# Patient Record
Sex: MALE | Race: WHITE | Employment: OTHER | URBAN - METROPOLITAN AREA
[De-identification: names, ages, dates, MRNs, and addresses within clinical notes are randomized per-mention and may not be internally consistent; named-entity substitution may affect disease eponyms.]

---

## 2018-07-18 ENCOUNTER — LAB ENCOUNTER (OUTPATIENT)
Dept: LAB | Facility: HOSPITAL | Age: 73
End: 2018-07-18
Attending: SPECIALIST
Payer: MEDICARE

## 2018-07-18 DIAGNOSIS — K51.90 ULCERATIVE COLITIS (HCC): Primary | ICD-10-CM

## 2018-07-18 LAB
ALBUMIN SERPL BCP-MCNC: 4 G/DL (ref 3.5–4.8)
ALBUMIN/GLOB SERPL: 1.6 {RATIO} (ref 1–2)
ALP SERPL-CCNC: 56 U/L (ref 32–100)
ALT SERPL-CCNC: 20 U/L (ref 17–63)
ANION GAP SERPL CALC-SCNC: 4 MMOL/L (ref 0–18)
AST SERPL-CCNC: 21 U/L (ref 15–41)
BASOPHILS # BLD: 0.1 K/UL (ref 0–0.2)
BASOPHILS NFR BLD: 1 %
BILIRUB SERPL-MCNC: 0.6 MG/DL (ref 0.3–1.2)
BUN SERPL-MCNC: 15 MG/DL (ref 8–20)
BUN/CREAT SERPL: 15.2 (ref 10–20)
CALCIUM SERPL-MCNC: 9 MG/DL (ref 8.5–10.5)
CHLORIDE SERPL-SCNC: 107 MMOL/L (ref 95–110)
CO2 SERPL-SCNC: 29 MMOL/L (ref 22–32)
CREAT SERPL-MCNC: 0.99 MG/DL (ref 0.5–1.5)
EOSINOPHIL # BLD: 0.4 K/UL (ref 0–0.7)
EOSINOPHIL NFR BLD: 6 %
ERYTHROCYTE [DISTWIDTH] IN BLOOD BY AUTOMATED COUNT: 14 % (ref 11–15)
GLOBULIN PLAS-MCNC: 2.5 G/DL (ref 2.5–3.7)
GLUCOSE SERPL-MCNC: 81 MG/DL (ref 70–99)
HCT VFR BLD AUTO: 44.5 % (ref 41–52)
HGB BLD-MCNC: 14.5 G/DL (ref 13.5–17.5)
LYMPHOCYTES # BLD: 2.7 K/UL (ref 1–4)
LYMPHOCYTES NFR BLD: 39 %
MCH RBC QN AUTO: 30.6 PG (ref 27–32)
MCHC RBC AUTO-ENTMCNC: 32.6 G/DL (ref 32–37)
MCV RBC AUTO: 93.9 FL (ref 80–100)
MONOCYTES # BLD: 0.6 K/UL (ref 0–1)
MONOCYTES NFR BLD: 9 %
NEUTROPHILS # BLD AUTO: 3.1 K/UL (ref 1.8–7.7)
NEUTROPHILS NFR BLD: 45 %
OSMOLALITY UR CALC.SUM OF ELEC: 290 MOSM/KG (ref 275–295)
PATIENT FASTING: NO
PLATELET # BLD AUTO: 201 K/UL (ref 140–400)
PMV BLD AUTO: 8.5 FL (ref 7.4–10.3)
POTASSIUM SERPL-SCNC: 4.1 MMOL/L (ref 3.3–5.1)
PROT SERPL-MCNC: 6.5 G/DL (ref 5.9–8.4)
RBC # BLD AUTO: 4.74 M/UL (ref 4.5–5.9)
SODIUM SERPL-SCNC: 140 MMOL/L (ref 136–144)
T4 SERPL-MCNC: 8.7 MCG/DL (ref 6.1–12.2)
WBC # BLD AUTO: 7 K/UL (ref 4–11)

## 2018-07-18 PROCEDURE — 36415 COLL VENOUS BLD VENIPUNCTURE: CPT

## 2018-07-18 PROCEDURE — 84436 ASSAY OF TOTAL THYROXINE: CPT

## 2018-07-18 PROCEDURE — 80053 COMPREHEN METABOLIC PANEL: CPT

## 2018-07-18 PROCEDURE — 85025 COMPLETE CBC W/AUTO DIFF WBC: CPT

## 2019-07-15 ENCOUNTER — LAB ENCOUNTER (OUTPATIENT)
Dept: LAB | Facility: HOSPITAL | Age: 74
End: 2019-07-15
Attending: SPECIALIST
Payer: MEDICARE

## 2019-07-15 DIAGNOSIS — K51.90 ULCERATIVE COLITIS (HCC): Primary | ICD-10-CM

## 2019-07-15 LAB
ALBUMIN SERPL-MCNC: 3.7 G/DL (ref 3.4–5)
ALBUMIN/GLOB SERPL: 1.1 {RATIO} (ref 1–2)
ALP LIVER SERPL-CCNC: 80 U/L (ref 45–117)
ALT SERPL-CCNC: 23 U/L (ref 16–61)
ANION GAP SERPL CALC-SCNC: 6 MMOL/L (ref 0–18)
AST SERPL-CCNC: 18 U/L (ref 15–37)
BASOPHILS # BLD AUTO: 0.1 X10(3) UL (ref 0–0.2)
BASOPHILS NFR BLD AUTO: 1.2 %
BILIRUB SERPL-MCNC: 0.3 MG/DL (ref 0.1–2)
BUN BLD-MCNC: 18 MG/DL (ref 7–18)
BUN/CREAT SERPL: 17.6 (ref 10–20)
CALCIUM BLD-MCNC: 8.8 MG/DL (ref 8.5–10.1)
CHLORIDE SERPL-SCNC: 107 MMOL/L (ref 98–112)
CO2 SERPL-SCNC: 29 MMOL/L (ref 21–32)
CREAT BLD-MCNC: 1.02 MG/DL (ref 0.7–1.3)
DEPRECATED RDW RBC AUTO: 45.1 FL (ref 35.1–46.3)
EOSINOPHIL # BLD AUTO: 0.59 X10(3) UL (ref 0–0.7)
EOSINOPHIL NFR BLD AUTO: 7.3 %
ERYTHROCYTE [DISTWIDTH] IN BLOOD BY AUTOMATED COUNT: 13.2 % (ref 11–15)
GLOBULIN PLAS-MCNC: 3.3 G/DL (ref 2.8–4.4)
GLUCOSE BLD-MCNC: 94 MG/DL (ref 70–99)
HCT VFR BLD AUTO: 44.9 % (ref 39–53)
HGB BLD-MCNC: 14.3 G/DL (ref 13–17.5)
IMM GRANULOCYTES # BLD AUTO: 0.03 X10(3) UL (ref 0–1)
IMM GRANULOCYTES NFR BLD: 0.4 %
LYMPHOCYTES # BLD AUTO: 2.91 X10(3) UL (ref 1–4)
LYMPHOCYTES NFR BLD AUTO: 36 %
M PROTEIN MFR SERPL ELPH: 7 G/DL (ref 6.4–8.2)
MCH RBC QN AUTO: 29.9 PG (ref 26–34)
MCHC RBC AUTO-ENTMCNC: 31.8 G/DL (ref 31–37)
MCV RBC AUTO: 93.9 FL (ref 80–100)
MONOCYTES # BLD AUTO: 0.61 X10(3) UL (ref 0.1–1)
MONOCYTES NFR BLD AUTO: 7.5 %
NEUTROPHILS # BLD AUTO: 3.84 X10 (3) UL (ref 1.5–7.7)
NEUTROPHILS # BLD AUTO: 3.84 X10(3) UL (ref 1.5–7.7)
NEUTROPHILS NFR BLD AUTO: 47.6 %
OSMOLALITY SERPL CALC.SUM OF ELEC: 296 MOSM/KG (ref 275–295)
PATIENT FASTING: NO
PLATELET # BLD AUTO: 216 10(3)UL (ref 150–450)
POTASSIUM SERPL-SCNC: 4.2 MMOL/L (ref 3.5–5.1)
RBC # BLD AUTO: 4.78 X10(6)UL (ref 3.8–5.8)
SODIUM SERPL-SCNC: 142 MMOL/L (ref 136–145)
WBC # BLD AUTO: 8.1 X10(3) UL (ref 4–11)

## 2019-07-15 PROCEDURE — 85025 COMPLETE CBC W/AUTO DIFF WBC: CPT

## 2019-07-15 PROCEDURE — 80053 COMPREHEN METABOLIC PANEL: CPT

## 2019-07-15 PROCEDURE — 36415 COLL VENOUS BLD VENIPUNCTURE: CPT

## 2019-08-01 ENCOUNTER — ANESTHESIA EVENT (OUTPATIENT)
Dept: ENDOSCOPY | Facility: HOSPITAL | Age: 74
End: 2019-08-01
Payer: MEDICARE

## 2019-08-01 ENCOUNTER — HOSPITAL ENCOUNTER (OUTPATIENT)
Facility: HOSPITAL | Age: 74
Setting detail: HOSPITAL OUTPATIENT SURGERY
Discharge: HOME | End: 2019-08-01
Attending: SPECIALIST | Admitting: SPECIALIST
Payer: MEDICARE

## 2019-08-01 ENCOUNTER — ANESTHESIA (OUTPATIENT)
Dept: ENDOSCOPY | Facility: HOSPITAL | Age: 74
End: 2019-08-01
Payer: MEDICARE

## 2019-08-01 VITALS
SYSTOLIC BLOOD PRESSURE: 105 MMHG | RESPIRATION RATE: 18 BRPM | DIASTOLIC BLOOD PRESSURE: 66 MMHG | HEART RATE: 65 BPM | HEIGHT: 70 IN | BODY MASS INDEX: 21.47 KG/M2 | OXYGEN SATURATION: 97 % | WEIGHT: 150 LBS

## 2019-08-01 DIAGNOSIS — Z87.19 HX OF ULCERATIVE COLITIS: ICD-10-CM

## 2019-08-01 PROCEDURE — 36415 COLL VENOUS BLD VENIPUNCTURE: CPT | Performed by: SPECIALIST

## 2019-08-01 PROCEDURE — 0DBF8ZX EXCISION OF RIGHT LARGE INTESTINE, VIA NATURAL OR ARTIFICIAL OPENING ENDOSCOPIC, DIAGNOSTIC: ICD-10-PCS | Performed by: SPECIALIST

## 2019-08-01 PROCEDURE — 0DBG8ZX EXCISION OF LEFT LARGE INTESTINE, VIA NATURAL OR ARTIFICIAL OPENING ENDOSCOPIC, DIAGNOSTIC: ICD-10-PCS | Performed by: SPECIALIST

## 2019-08-01 PROCEDURE — 88305 TISSUE EXAM BY PATHOLOGIST: CPT | Performed by: SPECIALIST

## 2019-08-01 RX ORDER — LIDOCAINE HYDROCHLORIDE 10 MG/ML
INJECTION, SOLUTION EPIDURAL; INFILTRATION; INTRACAUDAL; PERINEURAL AS NEEDED
Status: DISCONTINUED | OUTPATIENT
Start: 2019-08-01 | End: 2019-08-01 | Stop reason: SURG

## 2019-08-01 RX ORDER — SODIUM CHLORIDE, SODIUM LACTATE, POTASSIUM CHLORIDE, CALCIUM CHLORIDE 600; 310; 30; 20 MG/100ML; MG/100ML; MG/100ML; MG/100ML
INJECTION, SOLUTION INTRAVENOUS CONTINUOUS
Status: DISCONTINUED | OUTPATIENT
Start: 2019-08-01 | End: 2019-08-01

## 2019-08-01 RX ORDER — NALOXONE HYDROCHLORIDE 0.4 MG/ML
80 INJECTION, SOLUTION INTRAMUSCULAR; INTRAVENOUS; SUBCUTANEOUS AS NEEDED
Status: DISCONTINUED | OUTPATIENT
Start: 2019-08-01 | End: 2019-08-01

## 2019-08-01 RX ADMIN — SODIUM CHLORIDE, SODIUM LACTATE, POTASSIUM CHLORIDE, CALCIUM CHLORIDE: 600; 310; 30; 20 INJECTION, SOLUTION INTRAVENOUS at 09:11:00

## 2019-08-01 RX ADMIN — LIDOCAINE HYDROCHLORIDE 50 MG: 10 INJECTION, SOLUTION EPIDURAL; INFILTRATION; INTRACAUDAL; PERINEURAL at 08:40:00

## 2019-08-01 RX ADMIN — SODIUM CHLORIDE, SODIUM LACTATE, POTASSIUM CHLORIDE, CALCIUM CHLORIDE: 600; 310; 30; 20 INJECTION, SOLUTION INTRAVENOUS at 08:39:00

## 2019-08-01 NOTE — ANESTHESIA POSTPROCEDURE EVALUATION
Patient: Yimi Hdez.     Procedure Summary     Date:  08/01/19 Room / Location:  [facility] ENDOSCOPY 01 / [facility] ENDOSCOPY    Anesthesia Start:  5414 Anesthesia Stop:  0055    Procedure:  COLONOSCOPY (N/A ) Diagnosis:       Hx of ulcerative colitis      Marta Nissen

## 2019-08-01 NOTE — ANESTHESIA PREPROCEDURE EVALUATION
Anesthesia PreOp Note    HPI:     Gaetano Downeyr. is a 69year old male who presents for preoperative consultation requested by: Margaret Cherry MD    Date of Surgery: 8/1/2019    Procedure(s):  COLONOSCOPY  Indication: Hx of ulcerative colitis Non-medical: Not on file    Tobacco Use      Smoking status: Never Smoker      Smokeless tobacco: Never Used    Substance and Sexual Activity      Alcohol use: Yes        Frequency: Monthly or less        Comment: occationally 1/2 glasses of wine month history of anesthetic complications   Airway   Mallampati: I  TM distance: >3 FB  Neck ROM: full  Dental - normal exam     Pulmonary - negative ROS and normal exam   Cardiovascular - negative ROS and normal exam    Neuro/Psych - negative ROS     GI/Hepatic

## 2019-08-01 NOTE — OPERATIVE REPORT
[facility]    PATIENT'S NAME: Derek Biggs   ATTENDING PHYSICIAN: Loan Delgado. Beryl Schaeffer MD   OPERATING PHYSICIAN: Loan Delgado.  Beryl Schaeffer MD   PATIENT ACCOUNT#:   [id number]    LOCATION:  [address] ROOM 5 [facility] 10  MEDICAL RECORD #:   [id number]

## 2019-08-14 ENCOUNTER — OFFICE VISIT (OUTPATIENT)
Dept: INTERNAL MEDICINE CLINIC | Facility: CLINIC | Age: 74
End: 2019-08-14
Payer: MEDICARE

## 2019-08-14 VITALS
BODY MASS INDEX: 21 KG/M2 | WEIGHT: 145 LBS | DIASTOLIC BLOOD PRESSURE: 73 MMHG | RESPIRATION RATE: 17 BRPM | HEART RATE: 72 BPM | TEMPERATURE: 98 F | SYSTOLIC BLOOD PRESSURE: 131 MMHG

## 2019-08-14 DIAGNOSIS — K51.80 OTHER ULCERATIVE COLITIS WITHOUT COMPLICATION (HCC): ICD-10-CM

## 2019-08-14 DIAGNOSIS — Z00.00 MEDICARE ANNUAL WELLNESS VISIT, SUBSEQUENT: ICD-10-CM

## 2019-08-14 DIAGNOSIS — I83.92 ASYMPTOMATIC VARICOSE VEINS OF LEFT LOWER EXTREMITY: Primary | ICD-10-CM

## 2019-08-14 DIAGNOSIS — M89.9 BONE DISORDER: ICD-10-CM

## 2019-08-14 DIAGNOSIS — Z12.5 PROSTATE CANCER SCREENING: ICD-10-CM

## 2019-08-14 DIAGNOSIS — Z00.00 ENCOUNTER FOR ANNUAL HEALTH EXAMINATION: ICD-10-CM

## 2019-08-14 DIAGNOSIS — H40.10X1 OPEN-ANGLE GLAUCOMA OF BOTH EYES, MILD STAGE, UNSPECIFIED OPEN-ANGLE GLAUCOMA TYPE: ICD-10-CM

## 2019-08-14 DIAGNOSIS — E78.5 HYPERLIPIDEMIA, UNSPECIFIED HYPERLIPIDEMIA TYPE: ICD-10-CM

## 2019-08-14 PROCEDURE — G0439 PPPS, SUBSEQ VISIT: HCPCS | Performed by: INTERNAL MEDICINE

## 2019-08-15 NOTE — PROGRESS NOTES
HPI:    Patient ID: Lois Haines. is a 69year old male. HPI    Review of Systems         Current Outpatient Medications:  Mesalamine (ASACOL HD) 800 MG Oral Tab EC Take 800 mg by mouth 3 (three) times daily.  Disp:  Rfl:    latanoprost 0.005 % Op

## 2019-08-19 ENCOUNTER — MED REC SCAN ONLY (OUTPATIENT)
Dept: INTERNAL MEDICINE CLINIC | Facility: CLINIC | Age: 74
End: 2019-08-19

## 2019-08-19 NOTE — PATIENT INSTRUCTIONS
Juan Luis Cagle Jr.'s SCREENING SCHEDULE   Tests on this list are recommended by your physician but may not be covered, or covered at this frequency, by your insurer. Please check with your insurance carrier before scheduling to verify coverage.     PREV results found for this or any previous visit. No flowsheet data found. Fecal Occult Blood   Covered Annually No results found for: FOB, OCCULTSTOOL No flowsheet data found.      Barium Enema-   uncomfortable but covered  Covered but uncomfortable   Glau prescription benefits     Recommended Websites for Advanced Directives    [email or web address] org/publications/[email or web address]/[email or web address]. pdf  An information packet, including necessary form from the [facility] website. http://www. idph.stat Welcome to Medicare, and non-screening if indicated for medical reasons    Electrocardiogram date Routine EKG is not a screening covered service except at the Welcome to Medicare Visit    Abdominal aortic aneurysm screening (once between ages 73-68)  No re for Moderate/High Risk No orders found for this or any previous visit.  Medium/high risk factors:   End-stage renal disease   Hemophiliacs who received Factor VIII or IX concentrates   Clients of institutions for the mentally retarded   Persons who live in

## 2019-08-19 NOTE — PROGRESS NOTES
HPI:   Judy Scherer. is a 69year old male who presents for a MA (Medicare Advantage) Supervisit (Once per calendar year).      Pt here for first time to establish care and for annual exam   Provided his medical history in detail, has copies of his Last 3 Encounters:  08/14/19 : 145 lb (65.8 kg)  07/29/19 : 150 lb (68 kg)  12/05/16 : 142 lb (64.4 kg)     Last Cholesterol Labs:   No results found for: CHOLEST, HDL, LDL, TRIG       Last Chemistry Labs:   Lab Results   Component Value Date    AST 18 07/ Vaccination History     Immunization History   Administered Date(s) Administered   • FLU VACC High Dose 65 YRS & Older PRSV Free (50462) 10/15/2018   • Fluzone Vaccine Medicare () 10/06/2015, 12/10/2016, 12/07/2017        ASSESSMENT AND OTH of chart, separate sheet to patient  [address] Internal Lab or Procedure External Lab or Procedure   Diabetes Screening      HbgA1C   Annually No results found for: A1C    No flowsheet data found.     Fasting Blood Londell Amen prescription benefits, but Medicare does not cover unless Medically needed    Zoster   Not covered by Medicare Part B No vaccine history found This may be covered with your pharmacy  prescription benefits                    HPI:    Patient ID: Deedee Hansen friction rub. No murmur heard. Pulmonary/Chest: Effort normal and breath sounds normal. No respiratory distress. He has no wheezes. He has no rales. Abdominal: Soft. Bowel sounds are normal. He exhibits no distension. There is no tenderness.  There is

## 2019-08-20 ENCOUNTER — HOSPITAL ENCOUNTER (OUTPATIENT)
Dept: BONE DENSITY | Age: 74
Discharge: HOME | End: 2019-08-20
Attending: INTERNAL MEDICINE
Payer: MEDICARE

## 2019-08-20 ENCOUNTER — APPOINTMENT (OUTPATIENT)
Dept: LAB | Age: 74
End: 2019-08-20
Attending: INTERNAL MEDICINE
Payer: MEDICARE

## 2019-08-20 DIAGNOSIS — H40.10X1 OPEN-ANGLE GLAUCOMA OF BOTH EYES, MILD STAGE, UNSPECIFIED OPEN-ANGLE GLAUCOMA TYPE: ICD-10-CM

## 2019-08-20 DIAGNOSIS — M89.9 BONE DISORDER: ICD-10-CM

## 2019-08-20 DIAGNOSIS — E78.5 HYPERLIPIDEMIA, UNSPECIFIED HYPERLIPIDEMIA TYPE: ICD-10-CM

## 2019-08-20 DIAGNOSIS — K51.80 OTHER ULCERATIVE COLITIS WITHOUT COMPLICATION (HCC): ICD-10-CM

## 2019-08-20 DIAGNOSIS — I83.92 ASYMPTOMATIC VARICOSE VEINS OF LEFT LOWER EXTREMITY: ICD-10-CM

## 2019-08-20 DIAGNOSIS — Z12.5 PROSTATE CANCER SCREENING: ICD-10-CM

## 2019-08-20 LAB
BACTERIA UR QL AUTO: NEGATIVE /HPF
BILIRUB UR QL: NEGATIVE
CHOLEST SMN-MCNC: 166 MG/DL (ref ?–200)
CLARITY UR: CLEAR
COLOR UR: YELLOW
COMPLEXED PSA SERPL-MCNC: 2.02 NG/ML (ref ?–4)
GLUCOSE UR-MCNC: NEGATIVE MG/DL
HDLC SERPL-MCNC: 50 MG/DL (ref 40–59)
KETONES UR-MCNC: NEGATIVE MG/DL
LDLC SERPL CALC-MCNC: 101 MG/DL (ref ?–100)
NITRITE UR QL STRIP.AUTO: NEGATIVE
NONHDLC SERPL-MCNC: 116 MG/DL (ref ?–130)
PATIENT FASTING: YES
PH UR: 5 [PH] (ref 5–8)
PROT UR-MCNC: NEGATIVE MG/DL
RBC #/AREA URNS AUTO: 1 /HPF
SP GR UR STRIP: 1.02 (ref 1–1.03)
TRIGL SERPL-MCNC: 75 MG/DL (ref 30–149)
UROBILINOGEN UR STRIP-ACNC: <2
VIT C UR-MCNC: NEGATIVE MG/DL
VLDLC SERPL CALC-MCNC: 15 MG/DL (ref 0–30)
WBC #/AREA URNS AUTO: 3 /HPF

## 2019-08-20 PROCEDURE — 80061 LIPID PANEL: CPT

## 2019-08-20 PROCEDURE — 36415 COLL VENOUS BLD VENIPUNCTURE: CPT

## 2019-08-20 PROCEDURE — 81001 URINALYSIS AUTO W/SCOPE: CPT

## 2019-08-20 PROCEDURE — 77080 DXA BONE DENSITY AXIAL: CPT | Performed by: INTERNAL MEDICINE

## 2019-08-22 RX ORDER — ALENDRONATE SODIUM 70 MG/1
70 TABLET ORAL WEEKLY
Qty: 4 TABLET | Refills: 11 | Status: SHIPPED | OUTPATIENT
Start: 2019-08-22 | End: 2020-01-21

## 2019-08-28 NOTE — PROGRESS NOTES
Patient came in stating that some information on his annual physical was entered incorrectly. For the [address], portion that says \"Do you need help with. ..\", that has been corrected, along with the \"Functional Status\".   Only

## 2019-08-30 ENCOUNTER — APPOINTMENT (OUTPATIENT)
Dept: LAB | Facility: HOSPITAL | Age: 74
End: 2019-08-30
Attending: INTERNAL MEDICINE
Payer: MEDICARE

## 2019-08-30 DIAGNOSIS — R31.9 HEMATURIA, UNSPECIFIED TYPE: ICD-10-CM

## 2019-08-30 LAB
BACTERIA UR QL AUTO: NEGATIVE /HPF
BILIRUB UR QL: NEGATIVE
CLARITY UR: CLEAR
COLOR UR: YELLOW
GLUCOSE UR-MCNC: NEGATIVE MG/DL
KETONES UR-MCNC: NEGATIVE MG/DL
NITRITE UR QL STRIP.AUTO: NEGATIVE
PH UR: 5 [PH] (ref 5–8)
PROT UR-MCNC: NEGATIVE MG/DL
RBC #/AREA URNS AUTO: 2 /HPF
SP GR UR STRIP: 1.02 (ref 1–1.03)
UROBILINOGEN UR STRIP-ACNC: <2
VIT C UR-MCNC: NEGATIVE MG/DL
WBC #/AREA URNS AUTO: 3 /HPF

## 2019-08-30 PROCEDURE — 81001 URINALYSIS AUTO W/SCOPE: CPT

## 2019-09-04 ENCOUNTER — TELEPHONE (OUTPATIENT)
Dept: INTERNAL MEDICINE CLINIC | Facility: CLINIC | Age: 74
End: 2019-09-04

## 2019-09-04 DIAGNOSIS — R31.9 HEMATURIA, UNSPECIFIED TYPE: Primary | ICD-10-CM

## 2019-09-04 RX ORDER — CIPROFLOXACIN 250 MG/1
250 TABLET, FILM COATED ORAL 2 TIMES DAILY
Qty: 10 TABLET | Refills: 0 | Status: SHIPPED | OUTPATIENT
Start: 2019-09-04 | End: 2019-09-09

## 2019-09-04 NOTE — TELEPHONE ENCOUNTER
Doctor, on his office visit it looks like you may have done the CSF - [place] 65+ on patient and answered that pt does have difficulty with bathing, dressing, driving, ETC.   Pt is upset that all of these questions were answered in such way.  I tried doing an addendu

## 2019-09-04 NOTE — TELEPHONE ENCOUNTER
Spoke to patient to inform him that this has been taken care of. Pt states understanding. He will call us if he has any further questions.

## 2019-09-04 NOTE — TELEPHONE ENCOUNTER
Patient aware of antibiotic, results reviewed this morning with patient, requesting script be transferred from [facility] to [facility]. Script transferred per pt request. Script cancelled with Man.

## 2019-09-04 NOTE — TELEPHONE ENCOUNTER
Patient following up, reports is a new patient, was in last week and spoke with office staff regarding a questionnaire he filled out that was entered in system incorrectly.  Was told will follow up with supervisor about correcting this error, has not receiv

## 2019-09-11 ENCOUNTER — APPOINTMENT (OUTPATIENT)
Dept: LAB | Facility: HOSPITAL | Age: 74
End: 2019-09-11
Attending: INTERNAL MEDICINE
Payer: MEDICARE

## 2019-09-11 DIAGNOSIS — R31.9 HEMATURIA, UNSPECIFIED TYPE: ICD-10-CM

## 2019-09-11 PROCEDURE — 81001 URINALYSIS AUTO W/SCOPE: CPT

## 2019-09-12 LAB
BACTERIA UR QL AUTO: NEGATIVE /HPF
BILIRUB UR QL: NEGATIVE
CLARITY UR: CLEAR
COLOR UR: YELLOW
GLUCOSE UR-MCNC: NEGATIVE MG/DL
KETONES UR-MCNC: NEGATIVE MG/DL
LEUKOCYTE ESTERASE UR QL STRIP.AUTO: NEGATIVE
NITRITE UR QL STRIP.AUTO: NEGATIVE
PH UR: 5 [PH] (ref 5–8)
PROT UR-MCNC: NEGATIVE MG/DL
RBC #/AREA URNS AUTO: <1 /HPF
SP GR UR STRIP: 1.01 (ref 1–1.03)
UROBILINOGEN UR STRIP-ACNC: <2
VIT C UR-MCNC: NEGATIVE MG/DL
WBC #/AREA URNS AUTO: 1 /HPF

## 2019-09-13 ENCOUNTER — TELEPHONE (OUTPATIENT)
Dept: OTHER | Age: 74
End: 2019-09-13

## 2019-09-13 NOTE — TELEPHONE ENCOUNTER
[facility] pt stated that he is following your orders and he has made a appt to see Escobar Page and the soonest he is able to see him is 10/17/2019 at 1 pm. They have also placed him on a waiting list. Pt has no problem waiting until then but he wants to DR JOSE LEIGH

## 2019-10-17 ENCOUNTER — OFFICE VISIT (OUTPATIENT)
Dept: SURGERY | Facility: CLINIC | Age: 74
End: 2019-10-17
Payer: MEDICARE

## 2019-10-17 VITALS
BODY MASS INDEX: 20.76 KG/M2 | DIASTOLIC BLOOD PRESSURE: 68 MMHG | TEMPERATURE: 98 F | WEIGHT: 145 LBS | HEART RATE: 69 BPM | SYSTOLIC BLOOD PRESSURE: 109 MMHG | HEIGHT: 70 IN

## 2019-10-17 DIAGNOSIS — R82.90 ABNORMAL FINDING ON URINALYSIS: Primary | ICD-10-CM

## 2019-10-17 PROBLEM — K51.90 ULCERATIVE COLITIS (HCC): Status: ACTIVE | Noted: 2019-10-17

## 2019-10-17 PROCEDURE — G0463 HOSPITAL OUTPT CLINIC VISIT: HCPCS | Performed by: UROLOGY

## 2019-10-17 PROCEDURE — 99203 OFFICE O/P NEW LOW 30 MIN: CPT | Performed by: UROLOGY

## 2019-10-17 RX ORDER — ERYTHROMYCIN 5 MG/G
OINTMENT OPHTHALMIC
COMMUNITY
Start: 2007-06-08 | End: 2020-01-21

## 2019-10-17 RX ORDER — CHOLECALCIFEROL (VITAMIN D3) 125 MCG
2000 CAPSULE ORAL DAILY
COMMUNITY

## 2019-10-17 NOTE — PROGRESS NOTES
[facility], [facility] Urology  Initial Office Consultation    HPI:   Stella Porras. is a 69year old male here today for consultation at the request of, and a copy of this note will be sent to, Diane Sawant MD.    1. Abnormal Urinalysis  Patient referred b 109/68 (BP Location: Right arm, Patient Position: Sitting, Cuff Size: adult)   Pulse 69   Temp 97.8 °F (36.6 °C) (Oral)   Ht 5' 10\" (1.778 m)   Wt 145 lb (65.8 kg)   BMI 20.81 kg/m²     Physical Exam    Constitutional: He is oriented to person, place, and per the AUA guidelines. Given his overall good health status, I recommend continuing prostate cancer screening until the age of 76 with annual PSA and JENNIFER. All questions answered. PLAN:  1.  No further work-up necessary as patient does NOT have micro

## 2019-10-21 ENCOUNTER — TELEPHONE (OUTPATIENT)
Dept: SURGERY | Facility: CLINIC | Age: 74
End: 2019-10-21

## 2019-10-21 NOTE — TELEPHONE ENCOUNTER
Per pt he saw on [facility] that he has RN appointment on 11/27, unsure why this appointment was made, states Dr. Casey Davies mentioned he did not need follow up. Please advise thank you.

## 2019-10-21 NOTE — TELEPHONE ENCOUNTER
This copied from [address] [place] [address] note:       PLAN:  1. No further work-up necessary as patient does NOT have microscopic hematuria. PCP may continue to check annual UA.     2. Recommend continuing prostate cancer screening until the age of 76.     F/U PRN.      Nurse v

## 2019-10-30 ENCOUNTER — TELEPHONE (OUTPATIENT)
Dept: INTERNAL MEDICINE CLINIC | Facility: CLINIC | Age: 74
End: 2019-10-30

## 2019-10-30 NOTE — TELEPHONE ENCOUNTER
Patient with Mr. Liliana Mckoy from [address] [facility]) called in stating that a HIPPA request was sent 5 weeks ago 9/24 and again on 10/24 to Medical Records and today 10/30 to the office  (fax# [phone]).      Patient states that he

## 2019-10-31 NOTE — TELEPHONE ENCOUNTER
Patient walked into the [facility] office fax from [facility] was received. Since patient has been waiting for 5 weeks I printed his AVS from August also dexa scan and lab results were given to patient. HIPAA release sent to scanning.

## 2019-10-31 NOTE — TELEPHONE ENCOUNTER
Will look out for MARII from them, but I usually go through Dm rose before I hand it to him and I have not come across anything for this patient, at the [facility] or at [facility] location.    Im current doing that right too, and there is nothing in his bin for [facility]

## 2019-11-13 ENCOUNTER — OFFICE VISIT (OUTPATIENT)
Dept: INTERNAL MEDICINE CLINIC | Facility: CLINIC | Age: 74
End: 2019-11-13
Payer: MEDICARE

## 2019-11-13 VITALS
RESPIRATION RATE: 18 BRPM | SYSTOLIC BLOOD PRESSURE: 129 MMHG | OXYGEN SATURATION: 98 % | WEIGHT: 143 LBS | TEMPERATURE: 98 F | BODY MASS INDEX: 20.47 KG/M2 | HEART RATE: 85 BPM | DIASTOLIC BLOOD PRESSURE: 79 MMHG | HEIGHT: 70 IN

## 2019-11-13 DIAGNOSIS — K51.919 ULCERATIVE COLITIS WITH COMPLICATION, UNSPECIFIED LOCATION (HCC): Primary | ICD-10-CM

## 2019-11-13 DIAGNOSIS — M81.6 LOCALIZED OSTEOPOROSIS WITHOUT CURRENT PATHOLOGICAL FRACTURE: ICD-10-CM

## 2019-11-13 PROCEDURE — 99213 OFFICE O/P EST LOW 20 MIN: CPT | Performed by: INTERNAL MEDICINE

## 2019-11-13 NOTE — PROGRESS NOTES
HPI:    Patient ID: Hoang Vicente. is a 69year old male.     HPI   She comes in today with complaint of his ulcerative colitis flaring up he is noticed last week or so some mucousy discharge little bit of blood no abdominal pain is able to eat, some MG Oral Tab EC Take 800 mg by mouth 3 (three) times daily. • latanoprost 0.005 % Ophthalmic Solution nightly. • aspirin 81 MG Oral Tab Take 81 mg by mouth daily.        Allergies:  Imuran [Azathioprin*        Comment:Reaction/DVT L Lower leg  Sulfa

## 2019-11-14 ENCOUNTER — TELEPHONE (OUTPATIENT)
Dept: INTERNAL MEDICINE CLINIC | Facility: CLINIC | Age: 74
End: 2019-11-14

## 2019-11-14 DIAGNOSIS — K51.90 ULCERATIVE COLITIS WITHOUT COMPLICATIONS, UNSPECIFIED LOCATION (HCC): Primary | ICD-10-CM

## 2019-11-14 RX ORDER — MESALAMINE 1000 MG/1
500 SUPPOSITORY RECTAL NIGHTLY
Qty: 30 SUPPOSITORY | Refills: 2 | Status: SHIPPED | OUTPATIENT
Start: 2019-11-14 | End: 2019-12-17

## 2019-11-14 NOTE — TELEPHONE ENCOUNTER
Hi, can we reach to GI and see if they can see this sindy sooner he has UC flare up, he can see anyone in the GI department but courtney toscano seen soon next 1-2 weeks  Please let me know

## 2019-11-14 NOTE — TELEPHONE ENCOUNTER
Called patient twice about referral no auth required for office visit      Line was busy twice    [address] [facility]     Routed to pcp so aware.

## 2019-11-14 NOTE — TELEPHONE ENCOUNTER
Spoke to central scheduling for [facility],  Soonest toya they have available is with Dr Maritza Nieto on 11/25/19 @ 7:20am.   Is appt okay?

## 2019-11-14 NOTE — TELEPHONE ENCOUNTER
Patient called in stating that he attempted to schedule with GI department, Dr. Mary Kay Pantoja. However, his first available is towards the middle/end of January. Patient is asking to move forward either with Dr. Alley Ga reaching out to Dr. Sonido Lester or going the route of another provider. Please advise.

## 2019-11-14 NOTE — TELEPHONE ENCOUNTER
I spoke with patient and will start him on mesalamine suppositories also he says his symptoms have been stable but he has not been eating much he will follow with Dr. Dennys Lopez for 11/24.

## 2019-11-14 NOTE — TELEPHONE ENCOUNTER
spoke to scheduling dpt. , was informed that Dr Alicia Colindres 1st available appt is 1/15/20 and Dr Rosetta Le 1st available is 1/6/20. Scheduled pt for 1/6/20. No sooner appt available than that. Please advise.

## 2019-11-15 ENCOUNTER — TELEPHONE (OUTPATIENT)
Dept: INTERNAL MEDICINE CLINIC | Facility: CLINIC | Age: 74
End: 2019-11-15

## 2019-11-15 NOTE — TELEPHONE ENCOUNTER
Advised pharmacist at [facility] of Dr. Ivon Blanca note. Pharmacist verbalized understanding. Patient also aware of medication instructions.

## 2019-11-15 NOTE — TELEPHONE ENCOUNTER
[facility] calling stating mesalamine 1000 MG Rectal Suppos ordered by Dr. Agapito Milan on 11/14/19. Patient was also prescribed Mesalamine (ASACOL HD) 800 MG Oral Tab EC three times daily by Dr. Ne Raza.      Pharmacy would like to know if patient should take oral/recta

## 2019-12-17 PROCEDURE — 88305 TISSUE EXAM BY PATHOLOGIST: CPT | Performed by: INTERNAL MEDICINE

## 2019-12-18 ENCOUNTER — LAB ENCOUNTER (OUTPATIENT)
Dept: LAB | Facility: HOSPITAL | Age: 74
End: 2019-12-18
Attending: INTERNAL MEDICINE
Payer: MEDICARE

## 2019-12-18 DIAGNOSIS — K51.919 ULCERATIVE COLITIS WITH COMPLICATION, UNSPECIFIED LOCATION (HCC): ICD-10-CM

## 2019-12-18 PROCEDURE — 85652 RBC SED RATE AUTOMATED: CPT

## 2019-12-18 PROCEDURE — 36415 COLL VENOUS BLD VENIPUNCTURE: CPT

## 2019-12-18 PROCEDURE — 85060 BLOOD SMEAR INTERPRETATION: CPT

## 2019-12-18 PROCEDURE — 86141 C-REACTIVE PROTEIN HS: CPT

## 2019-12-18 PROCEDURE — 85025 COMPLETE CBC W/AUTO DIFF WBC: CPT

## 2020-07-14 ENCOUNTER — HOSPITAL ENCOUNTER (EMERGENCY)
Facility: HOSPITAL | Age: 75
Discharge: STILL ADMITTED OR ADMINISTRATIVE | End: 2020-07-14
Payer: MEDICARE

## 2020-07-14 ENCOUNTER — LAB ENCOUNTER (OUTPATIENT)
Dept: LAB | Facility: HOSPITAL | Age: 75
End: 2020-07-14
Attending: INTERNAL MEDICINE
Payer: MEDICARE

## 2020-07-14 DIAGNOSIS — K51.90 ULCERATIVE COLITIS WITHOUT COMPLICATIONS, UNSPECIFIED LOCATION (HCC): ICD-10-CM

## 2020-07-14 LAB
ALBUMIN SERPL-MCNC: 4 G/DL (ref 3.4–5)
ALBUMIN/GLOB SERPL: 1.2 {RATIO} (ref 1–2)
ALP LIVER SERPL-CCNC: 65 U/L (ref 45–117)
ALT SERPL-CCNC: 25 U/L (ref 16–61)
ANION GAP SERPL CALC-SCNC: 5 MMOL/L (ref 0–18)
AST SERPL-CCNC: 16 U/L (ref 15–37)
BASOPHILS # BLD AUTO: 0.09 X10(3) UL (ref 0–0.2)
BASOPHILS NFR BLD AUTO: 1 %
BILIRUB SERPL-MCNC: 0.6 MG/DL (ref 0.1–2)
BUN BLD-MCNC: 14 MG/DL (ref 7–18)
BUN/CREAT SERPL: 14.3 (ref 10–20)
CALCIUM BLD-MCNC: 9.7 MG/DL (ref 8.5–10.1)
CHLORIDE SERPL-SCNC: 103 MMOL/L (ref 98–112)
CO2 SERPL-SCNC: 31 MMOL/L (ref 21–32)
CREAT BLD-MCNC: 0.98 MG/DL (ref 0.7–1.3)
DEPRECATED RDW RBC AUTO: 48.3 FL (ref 35.1–46.3)
EOSINOPHIL # BLD AUTO: 0.28 X10(3) UL (ref 0–0.7)
EOSINOPHIL NFR BLD AUTO: 3 %
ERYTHROCYTE [DISTWIDTH] IN BLOOD BY AUTOMATED COUNT: 14.3 % (ref 11–15)
FOLATE SERPL-MCNC: 12.2 NG/ML (ref 8.7–?)
GLOBULIN PLAS-MCNC: 3.4 G/DL (ref 2.8–4.4)
GLUCOSE BLD-MCNC: 90 MG/DL (ref 70–99)
HCT VFR BLD AUTO: 45.1 % (ref 39–53)
HGB BLD-MCNC: 14.7 G/DL (ref 13–17.5)
IMM GRANULOCYTES # BLD AUTO: 0.03 X10(3) UL (ref 0–1)
IMM GRANULOCYTES NFR BLD: 0.3 %
LYMPHOCYTES # BLD AUTO: 2.89 X10(3) UL (ref 1–4)
LYMPHOCYTES NFR BLD AUTO: 31 %
M PROTEIN MFR SERPL ELPH: 7.4 G/DL (ref 6.4–8.2)
MCH RBC QN AUTO: 30 PG (ref 26–34)
MCHC RBC AUTO-ENTMCNC: 32.6 G/DL (ref 31–37)
MCV RBC AUTO: 92 FL (ref 80–100)
MONOCYTES # BLD AUTO: 0.63 X10(3) UL (ref 0.1–1)
MONOCYTES NFR BLD AUTO: 6.8 %
NEUTROPHILS # BLD AUTO: 5.4 X10 (3) UL (ref 1.5–7.7)
NEUTROPHILS # BLD AUTO: 5.4 X10(3) UL (ref 1.5–7.7)
NEUTROPHILS NFR BLD AUTO: 57.9 %
OSMOLALITY SERPL CALC.SUM OF ELEC: 288 MOSM/KG (ref 275–295)
PATIENT FASTING Y/N/NP: NO
PLATELET # BLD AUTO: 229 10(3)UL (ref 150–450)
POTASSIUM SERPL-SCNC: 3.8 MMOL/L (ref 3.5–5.1)
RBC # BLD AUTO: 4.9 X10(6)UL (ref 3.8–5.8)
SODIUM SERPL-SCNC: 139 MMOL/L (ref 136–145)
VIT B12 SERPL-MCNC: 499 PG/ML (ref 193–986)
WBC # BLD AUTO: 9.3 X10(3) UL (ref 4–11)

## 2020-07-14 PROCEDURE — 82607 VITAMIN B-12: CPT

## 2020-07-14 PROCEDURE — 82746 ASSAY OF FOLIC ACID SERUM: CPT

## 2020-07-14 PROCEDURE — 85025 COMPLETE CBC W/AUTO DIFF WBC: CPT

## 2020-07-14 PROCEDURE — 82306 VITAMIN D 25 HYDROXY: CPT

## 2020-07-14 PROCEDURE — 36415 COLL VENOUS BLD VENIPUNCTURE: CPT

## 2020-07-14 PROCEDURE — 80053 COMPREHEN METABOLIC PANEL: CPT

## 2020-07-15 LAB — 25(OH)D3 SERPL-MCNC: 43.5 NG/ML (ref 30–100)

## 2020-07-16 NOTE — PROGRESS NOTES
Sergio Lundberg,    Your labs look great! Keep it up! Lets get that CT scan given concern for a hernia.

## 2020-07-22 ENCOUNTER — HOSPITAL ENCOUNTER (OUTPATIENT)
Dept: CT IMAGING | Facility: HOSPITAL | Age: 75
Discharge: HOME | End: 2020-07-22
Attending: INTERNAL MEDICINE
Payer: MEDICARE

## 2020-07-22 DIAGNOSIS — K51.90 ULCERATIVE COLITIS WITHOUT COMPLICATIONS, UNSPECIFIED LOCATION (HCC): ICD-10-CM

## 2020-07-22 DIAGNOSIS — K46.9 ABDOMINAL HERNIA WITHOUT OBSTRUCTION AND WITHOUT GANGRENE, RECURRENCE NOT SPECIFIED, UNSPECIFIED HERNIA TYPE: ICD-10-CM

## 2020-07-22 PROCEDURE — 74176 CT ABD & PELVIS W/O CONTRAST: CPT | Performed by: INTERNAL MEDICINE

## 2020-07-23 NOTE — PROGRESS NOTES
Sergio Mckinley,     CT showing a large spigelian hernia. Will need to refer to surgery for evaluation.   Bowel is not incarcerated and he is not having pain however it is an impressive size on imaging and on exam.    I'll be in contact with Henna Frost and refer to s

## 2020-07-27 ENCOUNTER — OFFICE VISIT (OUTPATIENT)
Dept: INTERNAL MEDICINE CLINIC | Facility: CLINIC | Age: 75
End: 2020-07-27
Payer: MEDICARE

## 2020-07-27 VITALS
SYSTOLIC BLOOD PRESSURE: 125 MMHG | TEMPERATURE: 97 F | DIASTOLIC BLOOD PRESSURE: 77 MMHG | OXYGEN SATURATION: 99 % | RESPIRATION RATE: 17 BRPM | BODY MASS INDEX: 19.55 KG/M2 | WEIGHT: 132 LBS | HEART RATE: 69 BPM | HEIGHT: 69 IN

## 2020-07-27 DIAGNOSIS — Z00.00 ENCOUNTER FOR ANNUAL HEALTH EXAMINATION: ICD-10-CM

## 2020-07-27 DIAGNOSIS — Z23 NEED FOR VACCINATION: ICD-10-CM

## 2020-07-27 DIAGNOSIS — Z12.5 PROSTATE CANCER SCREENING: ICD-10-CM

## 2020-07-27 DIAGNOSIS — R21 RASH: ICD-10-CM

## 2020-07-27 DIAGNOSIS — Z13.6 SCREENING FOR CARDIOVASCULAR CONDITION: ICD-10-CM

## 2020-07-27 DIAGNOSIS — H40.10X1 OPEN-ANGLE GLAUCOMA OF BOTH EYES, MILD STAGE, UNSPECIFIED OPEN-ANGLE GLAUCOMA TYPE: ICD-10-CM

## 2020-07-27 DIAGNOSIS — Z11.59 NEED FOR HEPATITIS C SCREENING TEST: ICD-10-CM

## 2020-07-27 DIAGNOSIS — L25.9 CONTACT DERMATITIS, UNSPECIFIED CONTACT DERMATITIS TYPE, UNSPECIFIED TRIGGER: ICD-10-CM

## 2020-07-27 DIAGNOSIS — I83.92 ASYMPTOMATIC VARICOSE VEINS OF LEFT LOWER EXTREMITY: ICD-10-CM

## 2020-07-27 DIAGNOSIS — K43.9 SPIGELIAN HERNIA: ICD-10-CM

## 2020-07-27 DIAGNOSIS — M81.6 LOCALIZED OSTEOPOROSIS WITHOUT CURRENT PATHOLOGICAL FRACTURE: ICD-10-CM

## 2020-07-27 DIAGNOSIS — K51.919 ULCERATIVE COLITIS WITH COMPLICATION, UNSPECIFIED LOCATION (HCC): ICD-10-CM

## 2020-07-27 DIAGNOSIS — E78.5 HYPERLIPIDEMIA, UNSPECIFIED HYPERLIPIDEMIA TYPE: ICD-10-CM

## 2020-07-27 DIAGNOSIS — Z00.00 MEDICARE ANNUAL WELLNESS VISIT, SUBSEQUENT: Primary | ICD-10-CM

## 2020-07-27 DIAGNOSIS — K51.90 ULCERATIVE COLITIS WITHOUT COMPLICATIONS, UNSPECIFIED LOCATION (HCC): ICD-10-CM

## 2020-07-27 PROCEDURE — G0009 ADMIN PNEUMOCOCCAL VACCINE: HCPCS | Performed by: INTERNAL MEDICINE

## 2020-07-27 PROCEDURE — 90670 PCV13 VACCINE IM: CPT | Performed by: INTERNAL MEDICINE

## 2020-07-27 PROCEDURE — 93000 ELECTROCARDIOGRAM COMPLETE: CPT | Performed by: INTERNAL MEDICINE

## 2020-07-27 PROCEDURE — G0439 PPPS, SUBSEQ VISIT: HCPCS | Performed by: INTERNAL MEDICINE

## 2020-07-27 RX ORDER — CLOBETASOL PROPIONATE 0.5 MG/ML
1 LOTION TOPICAL 2 TIMES DAILY
Qty: 59 ML | Refills: 0 | Status: SHIPPED | OUTPATIENT
Start: 2020-07-27

## 2020-07-27 RX ORDER — MESALAMINE 1.2 G/1
1.2 TABLET, DELAYED RELEASE ORAL DAILY
COMMUNITY
End: 2021-05-06

## 2020-07-27 NOTE — PROGRESS NOTES
HPI:   Lit Silva. is a 70year old male who presents for a Medicare Initial Annual Wellness visit (Once after 12 month Medicare anniversary) .     Comes in for Medicare annual physical he is longtime life partner of 27 years recently  he had MD (GASTROENTEROLOGY)    Patient Active Problem List:     Glaucoma     Ulcerative colitis ([facility] [address]     Blepharitis     Myopia     Superficial injury of cornea     Spigelian hernia    Wt Readings from Last 3 Encounters:  07/27/20 : 132 lb (59.9 kg)  07/14/20 : HENT: Negative. Eyes: Negative. Respiratory: Negative. Cardiovascular: Negative. Gastrointestinal: Positive for abdominal pain. Endocrine: Negative. Genitourinary: Negative. Musculoskeletal: Negative. Skin: Negative.     Neurologica loss:  No                Visual Acuity                           Physical Exam   Nursing note and vitals reviewed. Constitutional: He is oriented to person, place, and time. HENT:   Head: Normocephalic and atraumatic.    Right Ear: Tympanic membrane and Future  -     LIPID PANEL; Future  -     HCV ANTIBODY;  Future  -     ELECTROCARDIOGRAM, COMPLETE  -     TSH W REFLEX TO FREE T4; Future    Hyperlipidemia, unspecified hyperlipidemia type we will retest  -     PNEUMOCOCCAL VACC, 13 LEVIA IM  -     PSA SCREEN; TSH W REFLEX TO FREE T4; Future      Need for vaccination  -     PNEUMOCOCCAL VACC, 13 ELVIA IM  -     PSA SCREEN; Future  -     LIPID PANEL; Future  -     HCV ANTIBODY;  Future  -     ELECTROCARDIOGRAM, COMPLETE  -     TSH W REFLEX TO FREE T4; Future    Eleuterio Linear Screening     LDL Annually LDL Cholesterol (mg/dL)   Date Value   08/01/2020 80        EKG - w/ Initial Preventative Physical Exam only, or if medically necessary Electrocardiogram date    Colorectal Cancer Screening      Colonoscopy Screen every 10 years

## 2020-07-27 NOTE — PATIENT INSTRUCTIONS
Juan Luis Cagle Jr.'s SCREENING SCHEDULE   Tests on this list are recommended by your physician but may not be covered, or covered at this frequency, by your insurer. Please check with your insurance carrier before scheduling to verify coverage.     PREV abnormal Colonoscopy due on 08/01/2029 Update [facility] if applicable    Flex Sigmoidoscopy Screen  Covered every 5 years No results found for this or any previous visit. No flowsheet data found.      Fecal Occult Blood   Covered Annually No result not cover unless Medically needed    Zoster (Not covered by Medicare Part B) No orders found for this or any previous visit. This may be covered with your pharmacy  prescription benefits     Recommended Websites for Advanced Directives    [email or web address]

## 2020-08-01 ENCOUNTER — APPOINTMENT (OUTPATIENT)
Dept: LAB | Facility: HOSPITAL | Age: 75
End: 2020-08-01
Attending: INTERNAL MEDICINE
Payer: MEDICARE

## 2020-08-01 DIAGNOSIS — K51.90 ULCERATIVE COLITIS WITHOUT COMPLICATIONS, UNSPECIFIED LOCATION (HCC): ICD-10-CM

## 2020-08-01 DIAGNOSIS — Z12.5 PROSTATE CANCER SCREENING: ICD-10-CM

## 2020-08-01 DIAGNOSIS — L25.9 CONTACT DERMATITIS, UNSPECIFIED CONTACT DERMATITIS TYPE, UNSPECIFIED TRIGGER: ICD-10-CM

## 2020-08-01 DIAGNOSIS — K43.9 SPIGELIAN HERNIA: ICD-10-CM

## 2020-08-01 DIAGNOSIS — Z13.6 SCREENING FOR CARDIOVASCULAR CONDITION: ICD-10-CM

## 2020-08-01 DIAGNOSIS — Z00.00 ENCOUNTER FOR ANNUAL HEALTH EXAMINATION: ICD-10-CM

## 2020-08-01 DIAGNOSIS — Z00.00 MEDICARE ANNUAL WELLNESS VISIT, SUBSEQUENT: ICD-10-CM

## 2020-08-01 DIAGNOSIS — R21 RASH: ICD-10-CM

## 2020-08-01 DIAGNOSIS — E78.5 HYPERLIPIDEMIA, UNSPECIFIED HYPERLIPIDEMIA TYPE: ICD-10-CM

## 2020-08-01 DIAGNOSIS — Z23 NEED FOR VACCINATION: ICD-10-CM

## 2020-08-01 DIAGNOSIS — I83.92 ASYMPTOMATIC VARICOSE VEINS OF LEFT LOWER EXTREMITY: ICD-10-CM

## 2020-08-01 DIAGNOSIS — Z11.59 NEED FOR HEPATITIS C SCREENING TEST: ICD-10-CM

## 2020-08-01 LAB
CHOLEST SMN-MCNC: 152 MG/DL (ref ?–200)
COMPLEXED PSA SERPL-MCNC: 2.42 NG/ML (ref ?–4)
HCV AB SERPL QL IA: NONREACTIVE
HDLC SERPL-MCNC: 56 MG/DL (ref 40–59)
LDLC SERPL CALC-MCNC: 80 MG/DL (ref ?–100)
NONHDLC SERPL-MCNC: 96 MG/DL (ref ?–130)
PATIENT FASTING Y/N/NP: YES
TRIGL SERPL-MCNC: 79 MG/DL (ref 30–149)
TSI SER-ACNC: 2.24 MIU/ML (ref 0.36–3.74)
VLDLC SERPL CALC-MCNC: 16 MG/DL (ref 0–30)

## 2020-08-01 PROCEDURE — 80061 LIPID PANEL: CPT

## 2020-08-01 PROCEDURE — 84443 ASSAY THYROID STIM HORMONE: CPT

## 2020-08-01 PROCEDURE — 86803 HEPATITIS C AB TEST: CPT

## 2020-08-01 PROCEDURE — 36415 COLL VENOUS BLD VENIPUNCTURE: CPT

## 2020-08-03 ENCOUNTER — OFFICE VISIT (OUTPATIENT)
Dept: SURGERY | Facility: CLINIC | Age: 75
End: 2020-08-03
Payer: MEDICARE

## 2020-08-03 VITALS — BODY MASS INDEX: 19.55 KG/M2 | WEIGHT: 132 LBS | HEIGHT: 69 IN

## 2020-08-03 DIAGNOSIS — K43.9 HERNIA OF ANTERIOR ABDOMINAL WALL: Primary | ICD-10-CM

## 2020-08-03 PROCEDURE — 99204 OFFICE O/P NEW MOD 45 MIN: CPT | Performed by: SURGERY

## 2020-08-03 PROCEDURE — G0463 HOSPITAL OUTPT CLINIC VISIT: HCPCS | Performed by: SURGERY

## 2020-08-03 NOTE — H&P
History and Physical      HPI   Patient presents with:  Hernia: Pt referred by Dr. Tami Kellogg regarding Spigelian hernia. Pt c/o small bulge to LLQ. Pt denies fever, dif. with bm's & urination.         HPI  Fred Session. is a 70year old male who presen Frequency: Monthly or less        Comment: occationally 1/2 glasses of wine month      Drug use: No      Sexual activity: Yes        Partners: Male    Family History   Problem Relation Age of Onset   • High Blood Pressure Mother    • Other (Other) Mother

## 2020-08-03 NOTE — H&P (VIEW-ONLY)
History and Physical      HPI   Patient presents with:  Hernia: Pt referred by Dr. Tiffany Javier regarding Spigelian hernia. Pt c/o small bulge to LLQ. Pt denies fever, dif. with bm's & urination.         HPI  Pauline Kaminski. is a 70year old male who presen Frequency: Monthly or less        Comment: occationally 1/2 glasses of wine month      Drug use: No      Sexual activity: Yes        Partners: Male    Family History   Problem Relation Age of Onset   • High Blood Pressure Mother    • Other (Other) Mother

## 2020-08-05 ENCOUNTER — DOCUMENTATION ONLY (OUTPATIENT)
Dept: SURGERY | Facility: CLINIC | Age: 75
End: 2020-08-05

## 2020-08-05 ENCOUNTER — TELEPHONE (OUTPATIENT)
Dept: SURGERY | Facility: CLINIC | Age: 75
End: 2020-08-05

## 2020-08-05 NOTE — PROGRESS NOTES
Visit Note    Active Problems   No diagnosis found. Chief Complaint: No chief complaint on file. HPI:  Dr.A. Jane Guerrero, Repair     Good afternoon, The patient Shayla Krueger was evaluated by Dr. Nicolás Childers on 8-3-2020.   Patient requires surgery, [place] Highest education level: Not on file    Occupational History      Not on file    Social Needs      Financial resource strain: Not on file      Food insecurity:        Worry: Not on file        Inability: Not on file      Transportation needs:        Abdi Enrique

## 2020-08-05 NOTE — TELEPHONE ENCOUNTER
Contact w/Patient. Addl questions re:surgery. Patient had labs w /Dr. Adore Eckert as well as EKG. I informed patient he did not need to have them again if they were normal and current. Dr. Janeen Weathers. Please review labs and EKG. I also sent request to Dr. Elham Callahan for medical clearance. Patient does require P.T. Please confirm or advise.   DRW for GL

## 2020-08-08 ENCOUNTER — LAB ENCOUNTER (OUTPATIENT)
Dept: LAB | Facility: HOSPITAL | Age: 75
End: 2020-08-08
Attending: SURGERY
Payer: MEDICARE

## 2020-08-08 DIAGNOSIS — K43.9 HERNIA OF ANTERIOR ABDOMINAL WALL: ICD-10-CM

## 2020-08-08 LAB
ALBUMIN SERPL-MCNC: 3.7 G/DL (ref 3.4–5)
ALBUMIN/GLOB SERPL: 1 {RATIO} (ref 1–2)
ALP LIVER SERPL-CCNC: 64 U/L (ref 45–117)
ALT SERPL-CCNC: 28 U/L (ref 16–61)
ANION GAP SERPL CALC-SCNC: 3 MMOL/L (ref 0–18)
AST SERPL-CCNC: 16 U/L (ref 15–37)
BASOPHILS # BLD AUTO: 0.08 X10(3) UL (ref 0–0.2)
BASOPHILS NFR BLD AUTO: 1 %
BILIRUB SERPL-MCNC: 0.5 MG/DL (ref 0.1–2)
BUN BLD-MCNC: 15 MG/DL (ref 7–18)
BUN/CREAT SERPL: 14.3 (ref 10–20)
CALCIUM BLD-MCNC: 9.1 MG/DL (ref 8.5–10.1)
CHLORIDE SERPL-SCNC: 106 MMOL/L (ref 98–112)
CO2 SERPL-SCNC: 31 MMOL/L (ref 21–32)
CREAT BLD-MCNC: 1.05 MG/DL (ref 0.7–1.3)
DEPRECATED RDW RBC AUTO: 47.1 FL (ref 35.1–46.3)
EOSINOPHIL # BLD AUTO: 0.3 X10(3) UL (ref 0–0.7)
EOSINOPHIL NFR BLD AUTO: 3.9 %
ERYTHROCYTE [DISTWIDTH] IN BLOOD BY AUTOMATED COUNT: 13.8 % (ref 11–15)
GLOBULIN PLAS-MCNC: 3.7 G/DL (ref 2.8–4.4)
GLUCOSE BLD-MCNC: 85 MG/DL (ref 70–99)
HCT VFR BLD AUTO: 44.3 % (ref 39–53)
HGB BLD-MCNC: 14.5 G/DL (ref 13–17.5)
IMM GRANULOCYTES # BLD AUTO: 0.01 X10(3) UL (ref 0–1)
IMM GRANULOCYTES NFR BLD: 0.1 %
INR BLD: 1.07 (ref 0.9–1.2)
LYMPHOCYTES # BLD AUTO: 3.53 X10(3) UL (ref 1–4)
LYMPHOCYTES NFR BLD AUTO: 45.7 %
M PROTEIN MFR SERPL ELPH: 7.4 G/DL (ref 6.4–8.2)
MCH RBC QN AUTO: 30.3 PG (ref 26–34)
MCHC RBC AUTO-ENTMCNC: 32.7 G/DL (ref 31–37)
MCV RBC AUTO: 92.5 FL (ref 80–100)
MONOCYTES # BLD AUTO: 0.64 X10(3) UL (ref 0.1–1)
MONOCYTES NFR BLD AUTO: 8.3 %
NEUTROPHILS # BLD AUTO: 3.16 X10 (3) UL (ref 1.5–7.7)
NEUTROPHILS # BLD AUTO: 3.16 X10(3) UL (ref 1.5–7.7)
NEUTROPHILS NFR BLD AUTO: 41 %
OSMOLALITY SERPL CALC.SUM OF ELEC: 290 MOSM/KG (ref 275–295)
PATIENT FASTING Y/N/NP: NO
PLATELET # BLD AUTO: 236 10(3)UL (ref 150–450)
POTASSIUM SERPL-SCNC: 4.3 MMOL/L (ref 3.5–5.1)
PROTHROMBIN TIME: 13.7 SECONDS (ref 11.8–14.5)
RBC # BLD AUTO: 4.79 X10(6)UL (ref 3.8–5.8)
SODIUM SERPL-SCNC: 140 MMOL/L (ref 136–145)
WBC # BLD AUTO: 7.7 X10(3) UL (ref 4–11)

## 2020-08-08 PROCEDURE — 85610 PROTHROMBIN TIME: CPT

## 2020-08-08 PROCEDURE — 36415 COLL VENOUS BLD VENIPUNCTURE: CPT

## 2020-08-08 PROCEDURE — 80053 COMPREHEN METABOLIC PANEL: CPT

## 2020-08-08 PROCEDURE — 85025 COMPLETE CBC W/AUTO DIFF WBC: CPT

## 2020-08-10 ENCOUNTER — TELEPHONE (OUTPATIENT)
Dept: INTERNAL MEDICINE CLINIC | Facility: CLINIC | Age: 75
End: 2020-08-10

## 2020-08-10 NOTE — TELEPHONE ENCOUNTER
Patient clarifying that his recent blood work has been mailed to his house. States he requested last week through [place].

## 2020-08-11 ENCOUNTER — TELEPHONE (OUTPATIENT)
Dept: SURGERY | Facility: CLINIC | Age: 75
End: 2020-08-11

## 2020-08-11 NOTE — TELEPHONE ENCOUNTER
Patient is confused about when and where he is supposed to have his covid test done. He states he has been given conflicting information and is asking to speak to [facility].  Please advise

## 2020-08-12 NOTE — TELEPHONE ENCOUNTER
Spoke to patient, informed him that labs have NOT been placed in the mail like he was promised during his last visit here. Pt state he would actually like to come and  labs.    I had already had results ready to mail out, so I placed the envelope i

## 2020-08-18 ENCOUNTER — TELEPHONE (OUTPATIENT)
Dept: SURGERY | Facility: CLINIC | Age: 75
End: 2020-08-18

## 2020-08-18 ENCOUNTER — LAB ENCOUNTER (OUTPATIENT)
Dept: LAB | Facility: HOSPITAL | Age: 75
End: 2020-08-18
Attending: SURGERY
Payer: MEDICARE

## 2020-08-18 DIAGNOSIS — Z01.818 PREOP TESTING: ICD-10-CM

## 2020-08-18 NOTE — TELEPHONE ENCOUNTER
Patient has surgery 8/20 and has a f/u scheduled for 8/31 that needs to be rescheduled, but Dr. Blake Driveri is unavailable until 9/14.  Please advise

## 2020-08-19 LAB — SARS-COV-2 RNA RESP QL NAA+PROBE: NOT DETECTED

## 2020-08-20 ENCOUNTER — ANESTHESIA EVENT (OUTPATIENT)
Dept: SURGERY | Facility: HOSPITAL | Age: 75
End: 2020-08-20
Payer: MEDICARE

## 2020-08-20 ENCOUNTER — HOSPITAL ENCOUNTER (OUTPATIENT)
Facility: HOSPITAL | Age: 75
Setting detail: HOSPITAL OUTPATIENT SURGERY
Discharge: HOME | End: 2020-08-20
Attending: SURGERY | Admitting: SURGERY
Payer: MEDICARE

## 2020-08-20 ENCOUNTER — ANESTHESIA (OUTPATIENT)
Dept: SURGERY | Facility: HOSPITAL | Age: 75
End: 2020-08-20
Payer: MEDICARE

## 2020-08-20 VITALS
BODY MASS INDEX: 19.4 KG/M2 | OXYGEN SATURATION: 100 % | RESPIRATION RATE: 18 BRPM | DIASTOLIC BLOOD PRESSURE: 64 MMHG | TEMPERATURE: 97 F | SYSTOLIC BLOOD PRESSURE: 117 MMHG | HEART RATE: 60 BPM | HEIGHT: 69 IN | WEIGHT: 131 LBS

## 2020-08-20 DIAGNOSIS — K43.9 HERNIA OF ANTERIOR ABDOMINAL WALL: ICD-10-CM

## 2020-08-20 DIAGNOSIS — Z01.818 PREOP TESTING: Primary | ICD-10-CM

## 2020-08-20 PROCEDURE — 49650 LAP ING HERNIA REPAIR INIT: CPT | Performed by: SURGERY

## 2020-08-20 PROCEDURE — 0YU64JZ SUPPLEMENT LEFT INGUINAL REGION WITH SYNTHETIC SUBSTITUTE, PERCUTANEOUS ENDOSCOPIC APPROACH: ICD-10-PCS | Performed by: SURGERY

## 2020-08-20 PROCEDURE — 0WQF4ZZ REPAIR ABDOMINAL WALL, PERCUTANEOUS ENDOSCOPIC APPROACH: ICD-10-PCS | Performed by: SURGERY

## 2020-08-20 DEVICE — PROGRIP MESH: Type: IMPLANTABLE DEVICE | Site: ABDOMEN | Status: FUNCTIONAL

## 2020-08-20 RX ORDER — BUPIVACAINE HYDROCHLORIDE 2.5 MG/ML
INJECTION, SOLUTION EPIDURAL; INFILTRATION; INTRACAUDAL AS NEEDED
Status: DISCONTINUED | OUTPATIENT
Start: 2020-08-20 | End: 2020-08-20 | Stop reason: HOSPADM

## 2020-08-20 RX ORDER — EPHEDRINE SULFATE 50 MG/ML
INJECTION, SOLUTION INTRAVENOUS AS NEEDED
Status: DISCONTINUED | OUTPATIENT
Start: 2020-08-20 | End: 2020-08-20 | Stop reason: SURG

## 2020-08-20 RX ORDER — ONDANSETRON 2 MG/ML
INJECTION INTRAMUSCULAR; INTRAVENOUS AS NEEDED
Status: DISCONTINUED | OUTPATIENT
Start: 2020-08-20 | End: 2020-08-20 | Stop reason: SURG

## 2020-08-20 RX ORDER — FAMOTIDINE 20 MG/1
20 TABLET ORAL ONCE
Status: DISCONTINUED | OUTPATIENT
Start: 2020-08-20 | End: 2020-08-20 | Stop reason: HOSPADM

## 2020-08-20 RX ORDER — MIDAZOLAM HYDROCHLORIDE 1 MG/ML
INJECTION INTRAMUSCULAR; INTRAVENOUS AS NEEDED
Status: DISCONTINUED | OUTPATIENT
Start: 2020-08-20 | End: 2020-08-20 | Stop reason: SURG

## 2020-08-20 RX ORDER — ROCURONIUM BROMIDE 10 MG/ML
INJECTION, SOLUTION INTRAVENOUS AS NEEDED
Status: DISCONTINUED | OUTPATIENT
Start: 2020-08-20 | End: 2020-08-20 | Stop reason: SURG

## 2020-08-20 RX ORDER — HYDROCODONE BITARTRATE AND ACETAMINOPHEN 5; 325 MG/1; MG/1
1 TABLET ORAL AS NEEDED
Status: COMPLETED | OUTPATIENT
Start: 2020-08-20 | End: 2020-08-20

## 2020-08-20 RX ORDER — NEOSTIGMINE METHYLSULFATE 1 MG/ML
INJECTION INTRAVENOUS AS NEEDED
Status: DISCONTINUED | OUTPATIENT
Start: 2020-08-20 | End: 2020-08-20 | Stop reason: SURG

## 2020-08-20 RX ORDER — ONDANSETRON 2 MG/ML
4 INJECTION INTRAMUSCULAR; INTRAVENOUS ONCE AS NEEDED
Status: DISCONTINUED | OUTPATIENT
Start: 2020-08-20 | End: 2020-08-20

## 2020-08-20 RX ORDER — HALOPERIDOL 5 MG/ML
0.25 INJECTION INTRAMUSCULAR ONCE AS NEEDED
Status: DISCONTINUED | OUTPATIENT
Start: 2020-08-20 | End: 2020-08-20

## 2020-08-20 RX ORDER — MORPHINE SULFATE 4 MG/ML
4 INJECTION, SOLUTION INTRAMUSCULAR; INTRAVENOUS EVERY 10 MIN PRN
Status: DISCONTINUED | OUTPATIENT
Start: 2020-08-20 | End: 2020-08-20

## 2020-08-20 RX ORDER — SODIUM CHLORIDE, SODIUM LACTATE, POTASSIUM CHLORIDE, CALCIUM CHLORIDE 600; 310; 30; 20 MG/100ML; MG/100ML; MG/100ML; MG/100ML
INJECTION, SOLUTION INTRAVENOUS CONTINUOUS
Status: DISCONTINUED | OUTPATIENT
Start: 2020-08-20 | End: 2020-08-20

## 2020-08-20 RX ORDER — ACETAMINOPHEN 500 MG
1000 TABLET ORAL ONCE
Status: COMPLETED | OUTPATIENT
Start: 2020-08-20 | End: 2020-08-20

## 2020-08-20 RX ORDER — HYDROMORPHONE HYDROCHLORIDE 1 MG/ML
0.4 INJECTION, SOLUTION INTRAMUSCULAR; INTRAVENOUS; SUBCUTANEOUS EVERY 5 MIN PRN
Status: DISCONTINUED | OUTPATIENT
Start: 2020-08-20 | End: 2020-08-20

## 2020-08-20 RX ORDER — PROCHLORPERAZINE EDISYLATE 5 MG/ML
5 INJECTION INTRAMUSCULAR; INTRAVENOUS ONCE AS NEEDED
Status: DISCONTINUED | OUTPATIENT
Start: 2020-08-20 | End: 2020-08-20

## 2020-08-20 RX ORDER — DEXAMETHASONE SODIUM PHOSPHATE 4 MG/ML
VIAL (ML) INJECTION AS NEEDED
Status: DISCONTINUED | OUTPATIENT
Start: 2020-08-20 | End: 2020-08-20 | Stop reason: SURG

## 2020-08-20 RX ORDER — MORPHINE SULFATE 4 MG/ML
2 INJECTION, SOLUTION INTRAMUSCULAR; INTRAVENOUS EVERY 10 MIN PRN
Status: DISCONTINUED | OUTPATIENT
Start: 2020-08-20 | End: 2020-08-20

## 2020-08-20 RX ORDER — METOCLOPRAMIDE 10 MG/1
10 TABLET ORAL ONCE
Status: DISCONTINUED | OUTPATIENT
Start: 2020-08-20 | End: 2020-08-20 | Stop reason: HOSPADM

## 2020-08-20 RX ORDER — HYDROMORPHONE HYDROCHLORIDE 1 MG/ML
0.6 INJECTION, SOLUTION INTRAMUSCULAR; INTRAVENOUS; SUBCUTANEOUS EVERY 5 MIN PRN
Status: DISCONTINUED | OUTPATIENT
Start: 2020-08-20 | End: 2020-08-20

## 2020-08-20 RX ORDER — NALOXONE HYDROCHLORIDE 0.4 MG/ML
80 INJECTION, SOLUTION INTRAMUSCULAR; INTRAVENOUS; SUBCUTANEOUS AS NEEDED
Status: DISCONTINUED | OUTPATIENT
Start: 2020-08-20 | End: 2020-08-20

## 2020-08-20 RX ORDER — CEFAZOLIN SODIUM/WATER 2 G/20 ML
2 SYRINGE (ML) INTRAVENOUS ONCE
Status: COMPLETED | OUTPATIENT
Start: 2020-08-20 | End: 2020-08-20

## 2020-08-20 RX ORDER — HYDROCODONE BITARTRATE AND ACETAMINOPHEN 5; 325 MG/1; MG/1
1 TABLET ORAL EVERY 6 HOURS PRN
Qty: 20 TABLET | Refills: 0 | Status: SHIPPED | OUTPATIENT
Start: 2020-08-20 | End: 2021-02-24 | Stop reason: ALTCHOICE

## 2020-08-20 RX ORDER — MORPHINE SULFATE 10 MG/ML
6 INJECTION, SOLUTION INTRAMUSCULAR; INTRAVENOUS EVERY 10 MIN PRN
Status: DISCONTINUED | OUTPATIENT
Start: 2020-08-20 | End: 2020-08-20

## 2020-08-20 RX ORDER — HYDROCODONE BITARTRATE AND ACETAMINOPHEN 5; 325 MG/1; MG/1
2 TABLET ORAL AS NEEDED
Status: COMPLETED | OUTPATIENT
Start: 2020-08-20 | End: 2020-08-20

## 2020-08-20 RX ORDER — HYDROMORPHONE HYDROCHLORIDE 1 MG/ML
0.2 INJECTION, SOLUTION INTRAMUSCULAR; INTRAVENOUS; SUBCUTANEOUS EVERY 5 MIN PRN
Status: DISCONTINUED | OUTPATIENT
Start: 2020-08-20 | End: 2020-08-20

## 2020-08-20 RX ORDER — GLYCOPYRROLATE 0.2 MG/ML
INJECTION, SOLUTION INTRAMUSCULAR; INTRAVENOUS AS NEEDED
Status: DISCONTINUED | OUTPATIENT
Start: 2020-08-20 | End: 2020-08-20 | Stop reason: SURG

## 2020-08-20 RX ADMIN — ROCURONIUM BROMIDE 10 MG: 10 INJECTION, SOLUTION INTRAVENOUS at 14:48:00

## 2020-08-20 RX ADMIN — CEFAZOLIN SODIUM/WATER 2 G: 2 G/20 ML SYRINGE (ML) INTRAVENOUS at 14:27:00

## 2020-08-20 RX ADMIN — DEXAMETHASONE SODIUM PHOSPHATE 4 MG: 4 MG/ML VIAL (ML) INJECTION at 14:27:00

## 2020-08-20 RX ADMIN — NEOSTIGMINE METHYLSULFATE 3 MG: 1 INJECTION INTRAVENOUS at 15:14:00

## 2020-08-20 RX ADMIN — EPHEDRINE SULFATE 5 MG: 50 INJECTION, SOLUTION INTRAVENOUS at 14:37:00

## 2020-08-20 RX ADMIN — ROCURONIUM BROMIDE 30 MG: 10 INJECTION, SOLUTION INTRAVENOUS at 14:21:00

## 2020-08-20 RX ADMIN — GLYCOPYRROLATE 0.4 MG: 0.2 INJECTION, SOLUTION INTRAMUSCULAR; INTRAVENOUS at 15:14:00

## 2020-08-20 RX ADMIN — SODIUM CHLORIDE, SODIUM LACTATE, POTASSIUM CHLORIDE, CALCIUM CHLORIDE: 600; 310; 30; 20 INJECTION, SOLUTION INTRAVENOUS at 13:54:00

## 2020-08-20 RX ADMIN — ONDANSETRON 4 MG: 2 INJECTION INTRAMUSCULAR; INTRAVENOUS at 15:04:00

## 2020-08-20 RX ADMIN — MIDAZOLAM HYDROCHLORIDE 2 MG: 1 INJECTION INTRAMUSCULAR; INTRAVENOUS at 14:17:00

## 2020-08-20 NOTE — INTERVAL H&P NOTE
Pre-op Diagnosis: Hernia of anterior abdominal wall [K43.9]    The above referenced H&P was reviewed by Caio Levy MD on 8/20/2020, the patient was examined and no significant changes have occurred in the patient's condition since the H&P was performed.

## 2020-08-20 NOTE — ANESTHESIA POSTPROCEDURE EVALUATION
Patient: Margarita Yeh.     Procedure Summary     Date:  08/20/20 Room / Location:  [address] [place] [address] MAIN OR 02 / [address] [facility] MAIN OR    Anesthesia Start:  3080 Anesthesia Stop:      Procedure:  LAPAROSCOPIC VENTRAL HERNIA REPAIR (Left ) Diagnosis:       Hernia of anterio

## 2020-08-20 NOTE — ANESTHESIA PREPROCEDURE EVALUATION
Anesthesia PreOp Note    HPI:     Tamela Murphy. is a 70year old male who presents for preoperative consultation requested by: Omer Boone MD    Date of Surgery: 8/20/2020    Procedure(s):  LAPAROSCOPIC VENTRAL HERNIA REPAIR  Indication: Hernia o (PEPCID) tab 20 mg, 20 mg, Oral, Once, Nicho Lu MD  Metoclopramide HCl (REGLAN) tab 10 mg, 10 mg, Oral, Once, Nicho Lu MD  ceFAZolin sodium (ANCEF/KEFZOL) 2 GM/20ML premix IV syringe 2 g, 2 g, Intravenous, Once, Nicho Lu MD    No current Physically abused: Not on file        Forced sexual activity: Not on file    Other Topics      Concerns:        Not on file    Social History Narrative      Not on file      Available pre-op labs reviewed.   Lab Results   Component Value Date    WBC 7.7 08/ (if preop done by other):  GA/ ETT/PONV,dental damages etc      I have informed Deneice Meigs. and/or legal guardian or family member of the nature of the anesthetic plan, benefits, risks including possible dental damage if relevant, major complicati

## 2020-08-20 NOTE — ANESTHESIA PROCEDURE NOTES
Airway  Date/Time: 8/20/2020 2:25 PM  Urgency: elective    Airway not difficult    General Information and Staff    Patient location during procedure: OR  Anesthesiologist: Ed Stallworth MD  Performed: anesthesiologist     Indications and Patient Condition

## 2020-08-21 NOTE — OPERATIVE REPORT
[facility]    PATIENT'S NAME: Mikayla Aurora   ATTENDING PHYSICIAN: Jennifer Damon MD   OPERATING PHYSICIAN: Jennifer Daomn MD   PATIENT ACCOUNT#:   [de-identified]    LOCATION:  [facility] 6 [facility] 10  MEDICAL RECORD #:   [id number]       DATE OF B hernia is now repaired using interrupted 0 Nurolon. Marcaine infiltrated for local block. Incisions closed anatomically with Monocryl. Benzoin and Steri-Strips are placed.      Dictated By Analilia Valenzuela MD  d: 08/20/2020 15:14:44  t: 08/20/2020 23:12:02

## 2020-09-01 ENCOUNTER — LAB ENCOUNTER (OUTPATIENT)
Dept: LAB | Age: 75
End: 2020-09-01
Attending: SURGERY
Payer: MEDICARE

## 2020-09-01 ENCOUNTER — OFFICE VISIT (OUTPATIENT)
Dept: SURGERY | Facility: CLINIC | Age: 75
End: 2020-09-01
Payer: COMMERCIAL

## 2020-09-01 VITALS — WEIGHT: 131 LBS | BODY MASS INDEX: 19.4 KG/M2 | HEIGHT: 69 IN

## 2020-09-01 DIAGNOSIS — Z98.890 POST-OPERATIVE STATE: ICD-10-CM

## 2020-09-01 DIAGNOSIS — N34.2 INFECTIVE URETHRITIS: ICD-10-CM

## 2020-09-01 DIAGNOSIS — Z98.890 POST-OPERATIVE STATE: Primary | ICD-10-CM

## 2020-09-01 LAB
BILIRUB UR QL: NEGATIVE
CLARITY UR: CLEAR
COLOR UR: YELLOW
GLUCOSE UR-MCNC: NEGATIVE MG/DL
HGB UR QL STRIP.AUTO: NEGATIVE
KETONES UR-MCNC: NEGATIVE MG/DL
LEUKOCYTE ESTERASE UR QL STRIP.AUTO: NEGATIVE
NITRITE UR QL STRIP.AUTO: NEGATIVE
PH UR: 5 [PH] (ref 5–8)
PROT UR-MCNC: NEGATIVE MG/DL
SP GR UR STRIP: 1.02 (ref 1–1.03)
UROBILINOGEN UR STRIP-ACNC: <2

## 2020-09-01 PROCEDURE — 99024 POSTOP FOLLOW-UP VISIT: CPT | Performed by: SURGERY

## 2020-09-01 PROCEDURE — 3008F BODY MASS INDEX DOCD: CPT | Performed by: SURGERY

## 2020-09-01 PROCEDURE — 81003 URINALYSIS AUTO W/O SCOPE: CPT

## 2020-09-01 NOTE — PROGRESS NOTES
Postoperative Patient Follow-up      9/1/2020    [facility]  Patient presents with:  Post-Op: Patient here       Shanti Staley. is a 70year old male post laparoscopic repair of left inguinal hernia and umbilical hernia with mesh. He is doing well.   Eating

## 2020-09-28 ENCOUNTER — OFFICE VISIT (OUTPATIENT)
Dept: SURGERY | Facility: CLINIC | Age: 75
End: 2020-09-28
Payer: COMMERCIAL

## 2020-09-28 VITALS — WEIGHT: 128 LBS | HEIGHT: 69 IN | BODY MASS INDEX: 18.96 KG/M2

## 2020-09-28 DIAGNOSIS — Z98.890 POST-OPERATIVE STATE: Primary | ICD-10-CM

## 2020-09-28 PROCEDURE — 3008F BODY MASS INDEX DOCD: CPT | Performed by: SURGERY

## 2020-09-28 PROCEDURE — 99024 POSTOP FOLLOW-UP VISIT: CPT | Performed by: SURGERY

## 2020-09-28 PROCEDURE — G0463 HOSPITAL OUTPT CLINIC VISIT: HCPCS | Performed by: SURGERY

## 2020-09-28 NOTE — PROGRESS NOTES
General surgery progress note    Lb Allen is seen almost 6 weeks postop. He continues to do well. No abdominal pain. He is released to full activity. All instructions given incisions are clean dry intact. He does have some complaints about weight loss.

## 2020-10-07 ENCOUNTER — TELEPHONE (OUTPATIENT)
Dept: SURGERY | Facility: CLINIC | Age: 75
End: 2020-10-07

## 2020-10-07 NOTE — TELEPHONE ENCOUNTER
Per pt he did not get an after care summary and asking if he can pick one up in clinic or if Dr. Yusef Bowens can send to it Dr. Myrna Betancourt in [facility] to pick it up.  Please advise

## 2020-10-08 NOTE — TELEPHONE ENCOUNTER
AVS printed and left at  [facility]. .Patient informed.   JOIE

## 2021-01-27 ENCOUNTER — OFFICE VISIT (OUTPATIENT)
Dept: INTERNAL MEDICINE CLINIC | Facility: CLINIC | Age: 76
End: 2021-01-27
Payer: COMMERCIAL

## 2021-01-27 VITALS
HEART RATE: 78 BPM | RESPIRATION RATE: 18 BRPM | DIASTOLIC BLOOD PRESSURE: 60 MMHG | TEMPERATURE: 98 F | BODY MASS INDEX: 19.4 KG/M2 | OXYGEN SATURATION: 99 % | SYSTOLIC BLOOD PRESSURE: 116 MMHG | HEIGHT: 69 IN | WEIGHT: 131 LBS

## 2021-01-27 DIAGNOSIS — K51.90 ULCERATIVE COLITIS WITHOUT COMPLICATIONS, UNSPECIFIED LOCATION (HCC): ICD-10-CM

## 2021-01-27 DIAGNOSIS — I83.92 ASYMPTOMATIC VARICOSE VEINS OF LEFT LOWER EXTREMITY: ICD-10-CM

## 2021-01-27 DIAGNOSIS — Z87.19 HISTORY OF HERNIA REPAIR: Primary | ICD-10-CM

## 2021-01-27 DIAGNOSIS — J32.9 CHRONIC CONGESTION OF PARANASAL SINUS: ICD-10-CM

## 2021-01-27 DIAGNOSIS — R21 RASH: ICD-10-CM

## 2021-01-27 DIAGNOSIS — Z98.890 HISTORY OF HERNIA REPAIR: Primary | ICD-10-CM

## 2021-01-27 DIAGNOSIS — M81.0 OSTEOPOROSIS, UNSPECIFIED OSTEOPOROSIS TYPE, UNSPECIFIED PATHOLOGICAL FRACTURE PRESENCE: ICD-10-CM

## 2021-01-27 DIAGNOSIS — H40.10X1 OPEN-ANGLE GLAUCOMA OF BOTH EYES, MILD STAGE, UNSPECIFIED OPEN-ANGLE GLAUCOMA TYPE: ICD-10-CM

## 2021-01-27 PROCEDURE — 3008F BODY MASS INDEX DOCD: CPT | Performed by: INTERNAL MEDICINE

## 2021-01-27 PROCEDURE — 99214 OFFICE O/P EST MOD 30 MIN: CPT | Performed by: INTERNAL MEDICINE

## 2021-01-27 PROCEDURE — 3074F SYST BP LT 130 MM HG: CPT | Performed by: INTERNAL MEDICINE

## 2021-01-27 PROCEDURE — 3078F DIAST BP <80 MM HG: CPT | Performed by: INTERNAL MEDICINE

## 2021-01-27 RX ORDER — A/SINGAPORE/GP1908/2015 IVR-180 (AN A/MICHIGAN/45/2015 (H1N1)PDM09-LIKE VIRUS, A/HONG KONG/4801/2014, NYMC X-263B (H3N2) (AN A/HONG KONG/4801/2014-LIKE VIRUS), AND B/BRISBANE/60/2008, WILD TYPE (A B/BRISBANE/60/2008-LIKE VIRUS) 15; 15; 15 UG/.5ML; UG/.5ML; UG/.5ML
INJECTION, SUSPENSION INTRAMUSCULAR
COMMUNITY
Start: 2020-09-21

## 2021-01-27 NOTE — PROGRESS NOTES
HPI:    Patient ID: Bhavik Panda. is a 70year old male. HPI   Patient here for 6-month follow-up he has got several questions and some updates on his health.   He had hernia repair doing very well he is awake the surgeon very much  Ulcerative col Prefilled Syringe      • HYDROcodone-acetaminophen 5-325 MG Oral Tab Take 1 tablet by mouth every 6 (six) hours as needed for Pain. 20 tablet 0   • mesalamine 1.2 g Oral Tab EC Take 1.2 g by mouth daily.  4 tabs PO daily     • Clobetasol Propionate (CLOBEX) thyromegaly present. Cardiovascular: Normal rate, regular rhythm, normal heart sounds and intact distal pulses. Exam reveals no friction rub. No murmur heard. Pulmonary/Chest: Effort normal and breath sounds normal. No respiratory distress.  He has no this encounter       Imaging & Referrals:  [facility] - INTERNAL        XX#[id number]

## 2021-02-18 ENCOUNTER — NURSE TRIAGE (OUTPATIENT)
Dept: INTERNAL MEDICINE CLINIC | Facility: CLINIC | Age: 76
End: 2021-02-18

## 2021-02-18 NOTE — TELEPHONE ENCOUNTER
Action Requested: Summary for Provider     []  Critical Lab, Recommendations Needed  [x] Need Additional Advice  []   FYI    []   Need Orders  [] Need Medications Sent to Pharmacy  []  Other     SUMMARY: Pt c/o left knee pain and swelling x 2 weeks.  He sta

## 2021-02-19 ENCOUNTER — OFFICE VISIT (OUTPATIENT)
Dept: INTERNAL MEDICINE CLINIC | Facility: CLINIC | Age: 76
End: 2021-02-19
Payer: COMMERCIAL

## 2021-02-19 VITALS
SYSTOLIC BLOOD PRESSURE: 118 MMHG | HEART RATE: 72 BPM | HEIGHT: 69 IN | WEIGHT: 130 LBS | TEMPERATURE: 98 F | DIASTOLIC BLOOD PRESSURE: 70 MMHG | RESPIRATION RATE: 17 BRPM | BODY MASS INDEX: 19.26 KG/M2 | OXYGEN SATURATION: 99 %

## 2021-02-19 DIAGNOSIS — M25.562 ACUTE PAIN OF LEFT KNEE: Primary | ICD-10-CM

## 2021-02-19 PROCEDURE — 99213 OFFICE O/P EST LOW 20 MIN: CPT | Performed by: INTERNAL MEDICINE

## 2021-02-19 PROCEDURE — 3078F DIAST BP <80 MM HG: CPT | Performed by: INTERNAL MEDICINE

## 2021-02-19 PROCEDURE — 3074F SYST BP LT 130 MM HG: CPT | Performed by: INTERNAL MEDICINE

## 2021-02-19 PROCEDURE — 3008F BODY MASS INDEX DOCD: CPT | Performed by: INTERNAL MEDICINE

## 2021-02-20 NOTE — PROGRESS NOTES
HPI:    Patient ID: Emmett Lennox. is a 70year old male. HPI  Patient comes in today with complaint of left knee pain and mild swelling. This started on Tuesday.   On Monday he removed the [place] decorations he had to crawl into a tight space units Oral Tab Take 2,000 Units by mouth daily. • latanoprost 0.005 % Ophthalmic Solution nightly.        Allergies:  Sulfa Antibiotics       HIVES  Imuran [Azathioprin*    OTHER (SEE COMMENTS)    Comment:Reaction/DVT L Lower leg   PHYSICAL EXAM:   Ph encounter       Imaging & Referrals:  None       AB#[id number]

## 2021-02-22 ENCOUNTER — TELEPHONE (OUTPATIENT)
Dept: INTERNAL MEDICINE CLINIC | Facility: CLINIC | Age: 76
End: 2021-02-22

## 2021-02-22 NOTE — TELEPHONE ENCOUNTER
Since better that is a good sign, continue with ICE, as need tylenol for pain can take 650 bid as need , do not take any more nsaids only Nsaid as need , but ice and wear a splint.

## 2021-02-22 NOTE — TELEPHONE ENCOUNTER
Agnes Law pt stated that he was in to see you on Friday and he has done what you wanted him to do for the next three days.  Pt stated that he started your treatment on Saturday morning as on Friday he had to get his house ready to be able to do what you aske

## 2021-02-23 NOTE — TELEPHONE ENCOUNTER
Jade Workman pt stated that he will start to take the Tylenol but he currently has 500 mg tylenol he will take this if it kwong snot help he will then buy the Tylenol Arthritis so he can take the 650 mg.  Today he was able to go down the stairs with less pain tod

## 2021-02-23 NOTE — TELEPHONE ENCOUNTER
Patient notified of provider's recommendation. Patient verbalized understanding. appt given 3/8/21 to f/u.

## 2021-03-08 ENCOUNTER — OFFICE VISIT (OUTPATIENT)
Dept: INTERNAL MEDICINE CLINIC | Facility: CLINIC | Age: 76
End: 2021-03-08
Payer: COMMERCIAL

## 2021-03-08 VITALS
HEIGHT: 69 IN | DIASTOLIC BLOOD PRESSURE: 66 MMHG | TEMPERATURE: 99 F | WEIGHT: 130 LBS | OXYGEN SATURATION: 99 % | BODY MASS INDEX: 19.26 KG/M2 | SYSTOLIC BLOOD PRESSURE: 120 MMHG | HEART RATE: 63 BPM | RESPIRATION RATE: 17 BRPM

## 2021-03-08 DIAGNOSIS — M25.562 ACUTE PAIN OF LEFT KNEE: Primary | ICD-10-CM

## 2021-03-08 PROCEDURE — 3008F BODY MASS INDEX DOCD: CPT | Performed by: INTERNAL MEDICINE

## 2021-03-08 PROCEDURE — 3078F DIAST BP <80 MM HG: CPT | Performed by: INTERNAL MEDICINE

## 2021-03-08 PROCEDURE — 99213 OFFICE O/P EST LOW 20 MIN: CPT | Performed by: INTERNAL MEDICINE

## 2021-03-08 PROCEDURE — 3074F SYST BP LT 130 MM HG: CPT | Performed by: INTERNAL MEDICINE

## 2021-03-08 NOTE — PROGRESS NOTES
HPI/Subjective:   Patient ID: Lois Haines. is a 70year old male.     HPI  Comes in for follow-up was seen about 2 weeks ago with complaint of left knee pain and some swelling he has been doing icing also wearing a splint but the splint does not wea Solution nightly. Allergies:  Sulfa Antibiotics       HIVES  Imuran [Azathioprin*    OTHER (SEE COMMENTS)    Comment:Reaction/DVT L Lower leg    Objective:   Physical Exam  Vitals and nursing note reviewed.    Constitutional:       Appearance: He is w

## 2021-03-15 DIAGNOSIS — Z23 NEED FOR VACCINATION: ICD-10-CM

## 2021-03-19 ENCOUNTER — TELEPHONE (OUTPATIENT)
Dept: INTERNAL MEDICINE CLINIC | Facility: CLINIC | Age: 76
End: 2021-03-19

## 2021-03-19 NOTE — TELEPHONE ENCOUNTER
Patient states he will be completing X-ray tomorrow and would like Dr. Salvador Kumar take a look at it once results are in and give him a call with recommendation with the specialist. Please advise.

## 2021-03-20 ENCOUNTER — HOSPITAL ENCOUNTER (OUTPATIENT)
Dept: GENERAL RADIOLOGY | Facility: HOSPITAL | Age: 76
Discharge: HOME | End: 2021-03-20
Attending: INTERNAL MEDICINE
Payer: MEDICARE

## 2021-03-20 DIAGNOSIS — M25.562 ACUTE PAIN OF LEFT KNEE: ICD-10-CM

## 2021-03-20 PROCEDURE — 73562 X-RAY EXAM OF KNEE 3: CPT | Performed by: INTERNAL MEDICINE

## 2021-03-22 ENCOUNTER — TELEPHONE (OUTPATIENT)
Dept: INTERNAL MEDICINE CLINIC | Facility: CLINIC | Age: 76
End: 2021-03-22

## 2021-03-22 NOTE — TELEPHONE ENCOUNTER
See result note documentation. Results and recommendations discussed at length with patient. Patient would very much like to discuss x ray results with Dr. Mago Greenberg prior to scheduling with Dr. Loan Barker.

## 2021-03-24 NOTE — TELEPHONE ENCOUNTER
I called pt and left message, I told  him to see the physiatrist , if he still needs to talk with e let me know

## 2021-03-26 NOTE — TELEPHONE ENCOUNTER
Per patient Dr Mago Greenberg don't need to call back patient and he got already his voice mail and patient will call back after he will see Dr Loan Barker on 04/07/2021. Patient says Thank you is calling patient last Tuesday.   Patient made an appointment with Dr Loan Barker

## 2021-04-07 ENCOUNTER — OFFICE VISIT (OUTPATIENT)
Dept: NEUROLOGY | Facility: CLINIC | Age: 76
End: 2021-04-07
Payer: COMMERCIAL

## 2021-04-07 ENCOUNTER — TELEPHONE (OUTPATIENT)
Dept: NEUROLOGY | Facility: CLINIC | Age: 76
End: 2021-04-07

## 2021-04-07 DIAGNOSIS — M22.2X9 PATELLOFEMORAL PAIN SYNDROME, UNSPECIFIED LATERALITY: ICD-10-CM

## 2021-04-07 DIAGNOSIS — M25.462 EFFUSION OF BURSA OF LEFT KNEE: ICD-10-CM

## 2021-04-07 DIAGNOSIS — M17.12 PRIMARY OSTEOARTHRITIS OF LEFT KNEE: Primary | ICD-10-CM

## 2021-04-07 PROCEDURE — 99204 OFFICE O/P NEW MOD 45 MIN: CPT | Performed by: PHYSICAL MEDICINE & REHABILITATION

## 2021-04-07 NOTE — PATIENT INSTRUCTIONS
1) My office will call you to schedule the LEFT knee HA and CSI under ultrasound guidance once the procedure is approved by your insurance carrier. 2) Start formal physical therapy as soon as possible.    3) Follow up with me 6 weeks after you begin phys

## 2021-04-07 NOTE — H&P
[address] H&P    Requesting Physician: Katherine Shaw MD    Chief Complaint (Reason for Visit):  Patient presents with:  Knee Pain: New patient c/o left knee pain w/ swelling x 9 weeks after doing ext [address] [place] [address] ENDOSCOPY   • LAPAROSCOPIC VENTRAL HERNIA REPAIR Left 8/20/2020    Performed by Patricia Wheeler MD at [address] [place] [address] MAIN OR        FAMILY HISTORY:   Family History   Problem Relation Age of Onset   • High Blood Pressure Mother    • Other (Other) Mother         Ab height or weight on file to calculate BMI. General: No immediate distress  Head: Normocephalic/ Atraumatic  Eyes: Extra-occular movements intact.    Ears: No auricular hematoma or deformities  Mouth: No lesions or ulcerations  Heart: peripheral pulses Urobilinogen Urine 09/01/2020 <2.0  <2.0 Final   • Nitrite Urine 09/01/2020 Negative  Negative Final   • Leukocyte Esterase Urine 09/01/2020 Negative  Negative Final   • Microscopic 09/01/2020 Microscopic not indicated   Final   ]      Radiology Imaging: There were no barriers to learning. Primary osteoarthritis of left knee  (primary encounter diagnosis)  Patellofemoral pain syndrome, unspecified laterality  Effusion of bursa of left knee    Lm Gifford MD, [address] [place] [address]  Physical Medicine and Re

## 2021-04-16 ENCOUNTER — TELEPHONE (OUTPATIENT)
Dept: PHYSICAL THERAPY | Facility: HOSPITAL | Age: 76
End: 2021-04-16

## 2021-04-20 NOTE — TELEPHONE ENCOUNTER
Called [facility] for authorization of approval of    LEFT knee Durolane and CSI under ultrasound guidance cpt codes 08990, [id number], . Talked to [employer]. Authorization is not required. [address] [place] [address]. Reference #  [id number]. Will  inform Nursing.
LMTCB
Patient scheduled 04/27/21.
Pt has questions regarding injection scheduled 4/27-please advise
Pt returning call-please call
S/w patient requesting appointment time and appointment for f/u
2-3days

## 2021-04-21 ENCOUNTER — OFFICE VISIT (OUTPATIENT)
Dept: PHYSICAL THERAPY | Facility: HOSPITAL | Age: 76
End: 2021-04-21
Attending: PHYSICAL MEDICINE & REHABILITATION
Payer: MEDICARE

## 2021-04-21 DIAGNOSIS — M17.12 PRIMARY OSTEOARTHRITIS OF LEFT KNEE: ICD-10-CM

## 2021-04-21 DIAGNOSIS — M25.462 EFFUSION OF BURSA OF LEFT KNEE: ICD-10-CM

## 2021-04-21 DIAGNOSIS — M22.2X9 PATELLOFEMORAL PAIN SYNDROME, UNSPECIFIED LATERALITY: ICD-10-CM

## 2021-04-21 PROCEDURE — 97162 PT EVAL MOD COMPLEX 30 MIN: CPT

## 2021-04-21 PROCEDURE — 97110 THERAPEUTIC EXERCISES: CPT

## 2021-04-21 NOTE — PROGRESS NOTES
KNEE EVALUATION:   Referring Physician: Dr. Loretta Pryor  Diagnosis: Primary osteoarthritis of left knee (M17.12)  Patellofemoral pain syndrome, unspecified laterality (M22.2X9)  Effusion of bursa of left knee (V63.856)    Date of Service: 4/21/2021     ALDO strength, motor control, gait, muscle length. Adolph De La Rosa. with signs and symptoms consistent with his medical diagnosis. Pt educated on biomechanical causes of his knee pain and likely PT interventions to improve his LE kinematics.      Sarai Chun Neg   Joint Line       Medial (+) Neg    Lateral Neg Neg     Swelling:  Min increased medial joint line  Special tests:     Name of Test Left Right Comments   Lachman’s Negative Negative    Patellar Apprehension Test Negative Negative    Valgus Stress Nega for 30 mins with 2/10 pain or less to improve household ADL performance such as cooking/cleaning    Frequency / Duration: Patient will be seen for 2 x/week or a total of 10 visits over a 90 day period. Treatment will include: Manual Therapy;  Therapeuti

## 2021-04-27 ENCOUNTER — OFFICE VISIT (OUTPATIENT)
Dept: NEUROLOGY | Facility: CLINIC | Age: 76
End: 2021-04-27
Payer: COMMERCIAL

## 2021-04-27 ENCOUNTER — OFFICE VISIT (OUTPATIENT)
Dept: PHYSICAL THERAPY | Facility: HOSPITAL | Age: 76
End: 2021-04-27
Attending: PHYSICAL MEDICINE & REHABILITATION
Payer: MEDICARE

## 2021-04-27 DIAGNOSIS — M25.462 EFFUSION OF BURSA OF LEFT KNEE: ICD-10-CM

## 2021-04-27 DIAGNOSIS — M22.2X9 PATELLOFEMORAL PAIN SYNDROME, UNSPECIFIED LATERALITY: ICD-10-CM

## 2021-04-27 DIAGNOSIS — M17.12 PRIMARY OSTEOARTHRITIS OF LEFT KNEE: Primary | ICD-10-CM

## 2021-04-27 PROCEDURE — 20611 DRAIN/INJ JOINT/BURSA W/US: CPT | Performed by: PHYSICAL MEDICINE & REHABILITATION

## 2021-04-27 PROCEDURE — 97110 THERAPEUTIC EXERCISES: CPT

## 2021-04-27 RX ORDER — TRIAMCINOLONE ACETONIDE 40 MG/ML
40 INJECTION, SUSPENSION INTRA-ARTICULAR; INTRAMUSCULAR ONCE
Status: COMPLETED | OUTPATIENT
Start: 2021-04-27 | End: 2021-04-27

## 2021-04-27 RX ORDER — LIDOCAINE HYDROCHLORIDE 10 MG/ML
7 INJECTION, SOLUTION INFILTRATION; PERINEURAL ONCE
Status: COMPLETED | OUTPATIENT
Start: 2021-04-27 | End: 2021-04-27

## 2021-04-27 NOTE — PROGRESS NOTES
Dx: Primary osteoarthritis of left knee (M17.12)  Patellofemoral pain syndrome, unspecified laterality (M22.2X9)  Effusion of bursa of left knee (V76.781)           Authorized # of Visits:  2/10         Next MD visit: 4/27/2020 Dr. Dheeraj LEVINE

## 2021-04-27 NOTE — PROCEDURES
[address]   Knee Joint Injection Procedure Note    CHIEF COMPLAINT:  No chief complaint on file.       PROCEDURE PERFORMED:  LEFT knee intra-articular Durolnae and corticosteroid injection under ultrasound g was then accessed using an 18-gauge, 1-1/2-inch needle, which was visualized on ultrasound, using approximately 3 cc of 1% lidocaine to numb the soft tissue.   Once the intra-articular space was visualized on ultrasound, with the needle accessing the space,

## 2021-04-30 ENCOUNTER — OFFICE VISIT (OUTPATIENT)
Dept: PHYSICAL THERAPY | Facility: HOSPITAL | Age: 76
End: 2021-04-30
Attending: PHYSICAL MEDICINE & REHABILITATION
Payer: MEDICARE

## 2021-04-30 ENCOUNTER — TELEPHONE (OUTPATIENT)
Dept: NEUROLOGY | Facility: CLINIC | Age: 76
End: 2021-04-30

## 2021-04-30 PROCEDURE — 97110 THERAPEUTIC EXERCISES: CPT

## 2021-04-30 NOTE — PROGRESS NOTES
Dx: Primary osteoarthritis of left knee (M17.12)  Patellofemoral pain syndrome, unspecified laterality (M22.2X9)  Effusion of bursa of left knee (Z28.962)           Authorized # of Visits:  2/10         Next MD visit: 4/27/2020 Dr. Bennie LEVINE

## 2021-04-30 NOTE — TELEPHONE ENCOUNTER
pt called and is asking if the bandages when to be removed? and is there any benefit to ice after inj? please call. has many questions. Can leave a message. He is doing very well.

## 2021-04-30 NOTE — TELEPHONE ENCOUNTER
Update : LEFT knee intra-articular Durolnae and corticosteroid injection under ultrasound guidance 4/27/2021    Patient called in regards to questions about injection above. Patient states that the injection was very successful.  Patient has gained mobility

## 2021-05-04 ENCOUNTER — OFFICE VISIT (OUTPATIENT)
Dept: PHYSICAL THERAPY | Facility: HOSPITAL | Age: 76
End: 2021-05-04
Attending: PHYSICAL MEDICINE & REHABILITATION
Payer: MEDICARE

## 2021-05-04 PROCEDURE — 97110 THERAPEUTIC EXERCISES: CPT

## 2021-05-04 NOTE — PROGRESS NOTES
Dx: Primary osteoarthritis of left knee (M17.12)  Patellofemoral pain syndrome, unspecified laterality (M22.2X9)  Effusion of bursa of left knee (Z32.042)           Authorized # of Visits:  4/10         Next MD visit: 4/27/2020 Dr. Ladi LEVINE

## 2021-05-06 ENCOUNTER — OFFICE VISIT (OUTPATIENT)
Dept: PHYSICAL THERAPY | Facility: HOSPITAL | Age: 76
End: 2021-05-06
Attending: PHYSICAL MEDICINE & REHABILITATION
Payer: MEDICARE

## 2021-05-06 PROCEDURE — 97110 THERAPEUTIC EXERCISES: CPT

## 2021-05-06 PROCEDURE — 97112 NEUROMUSCULAR REEDUCATION: CPT

## 2021-05-06 NOTE — PROGRESS NOTES
Dx: Primary osteoarthritis of left knee (M17.12)  Patellofemoral pain syndrome, unspecified laterality (M22.2X9)  Effusion of bursa of left knee (Z90.812)           Authorized # of Visits:  4/10         Next MD visit: 4/27/2020 Dr. Ladi LEVINE to ascend/descend stairs with his home with 2/10 pain or less to improve safety  3.  Gaetano Courser. will report he is able to stand for 30 mins with 2/10 pain or less to improve household ADL performance such as cooking/cleaning    Plan:  Continue wit

## 2021-05-11 ENCOUNTER — OFFICE VISIT (OUTPATIENT)
Dept: PHYSICAL THERAPY | Facility: HOSPITAL | Age: 76
End: 2021-05-11
Attending: PHYSICAL MEDICINE & REHABILITATION
Payer: MEDICARE

## 2021-05-11 PROCEDURE — 97110 THERAPEUTIC EXERCISES: CPT

## 2021-05-11 NOTE — PROGRESS NOTES
Dx: Primary osteoarthritis of left knee (M17.12)  Patellofemoral pain syndrome, unspecified laterality (M22.2X9)  Effusion of bursa of left knee (G44.410)           Authorized # of Visits:  6/10         Next MD visit: 4/27/2020 Dr. Mel LEVINE improve household ADL performance such as cooking/cleaning    Plan:  Continue with POC with focus on LE strengthening.     Charges: Marshall 3 Total Timed Treatment: 40 min     Total Treatment Time: 40 min

## 2021-05-13 ENCOUNTER — OFFICE VISIT (OUTPATIENT)
Dept: PHYSICAL THERAPY | Facility: HOSPITAL | Age: 76
End: 2021-05-13
Attending: PHYSICAL MEDICINE & REHABILITATION
Payer: MEDICARE

## 2021-05-13 PROCEDURE — 97110 THERAPEUTIC EXERCISES: CPT

## 2021-05-13 NOTE — PROGRESS NOTES
Dx: Primary osteoarthritis of left knee (M17.12)  Patellofemoral pain syndrome, unspecified laterality (M22.2X9)  Effusion of bursa of left knee (Y75.914)           Authorized # of Visits:  6/10         Next MD visit: 4/27/2020 Dr. Katherin LEVINE able to stand for 30 mins with 2/10 pain or less to improve household ADL performance such as cooking/cleaning    Plan:  Continue with POC with focus on LE strengthening.     Charges: Marshall 3 Total Timed Treatment: 40 min     Total Treatment Time: 40 min

## 2021-05-18 ENCOUNTER — OFFICE VISIT (OUTPATIENT)
Dept: PHYSICAL THERAPY | Facility: HOSPITAL | Age: 76
End: 2021-05-18
Attending: PHYSICAL MEDICINE & REHABILITATION
Payer: MEDICARE

## 2021-05-18 PROCEDURE — 97110 THERAPEUTIC EXERCISES: CPT

## 2021-05-18 NOTE — PROGRESS NOTES
Dx: Primary osteoarthritis of left knee (M17.12)  Patellofemoral pain syndrome, unspecified laterality (M22.2X9)  Effusion of bursa of left knee (J73.894)           Authorized # of Visits:  8/10         Next MD visit: 4/27/2020 Dr. Alana Dudley  Re-education:          Hip EXT into SB    2x10 x5 sec (B) 3x10 x5 sec (B) 3x10 x5 sec (B)    Rocker Board  1. A/P  2. Staggered  3.  Lateral    1. 2 mins  2. 2 mins (B)  3. 2 mins  1. 2 mins  2. 2 mins (B)  3. 2 mins              Modalities:              AR Total Timed Treatment: 38 min     Total Treatment Time: 38 min

## 2021-05-19 ENCOUNTER — OFFICE VISIT (OUTPATIENT)
Dept: NEUROLOGY | Facility: CLINIC | Age: 76
End: 2021-05-19
Payer: COMMERCIAL

## 2021-05-19 VITALS
BODY MASS INDEX: 19.26 KG/M2 | OXYGEN SATURATION: 94 % | HEIGHT: 69 IN | DIASTOLIC BLOOD PRESSURE: 56 MMHG | HEART RATE: 84 BPM | WEIGHT: 130 LBS | SYSTOLIC BLOOD PRESSURE: 104 MMHG

## 2021-05-19 DIAGNOSIS — M17.12 PRIMARY OSTEOARTHRITIS OF LEFT KNEE: Primary | ICD-10-CM

## 2021-05-19 DIAGNOSIS — M22.2X9 PATELLOFEMORAL PAIN SYNDROME, UNSPECIFIED LATERALITY: ICD-10-CM

## 2021-05-19 DIAGNOSIS — M25.462 EFFUSION OF BURSA OF LEFT KNEE: ICD-10-CM

## 2021-05-19 PROCEDURE — 3074F SYST BP LT 130 MM HG: CPT | Performed by: PHYSICAL MEDICINE & REHABILITATION

## 2021-05-19 PROCEDURE — 3078F DIAST BP <80 MM HG: CPT | Performed by: PHYSICAL MEDICINE & REHABILITATION

## 2021-05-19 PROCEDURE — 3008F BODY MASS INDEX DOCD: CPT | Performed by: PHYSICAL MEDICINE & REHABILITATION

## 2021-05-19 PROCEDURE — 99214 OFFICE O/P EST MOD 30 MIN: CPT | Performed by: PHYSICAL MEDICINE & REHABILITATION

## 2021-05-19 NOTE — PROGRESS NOTES
[address]  Progress Note    CHIEF COMPLAINT:  Patient presents with: Injection: F/U- Left knee durolane 4/27. Patient states that left knee is not in constant pain anymore.  Only a bit of pain when he kneel aneurysm   • Cancer Father         colon        CURRENT MEDICATIONS:   Current Outpatient Medications   Medication Sig Dispense Refill   • mesalamine 1.2 g Oral Tab EC Take 4 tablets (4.8 g total) by mouth daily.  4 tabs PO daily 120 tablet 2   • FLUAD QUAD intact  Motor:    Musculoskeletal:    KNEE:  Inspection: no erythema or obvious deformity.   Mild swelling noted over the left knee  Palpation: no ttp over medial joint line, lateral joint line, pes anserine bursa, tibial tubercle, gerdy's tubercle, medial PROCEDURE: XR KNEE ROUTINE (3 VIEWS), LEFT (CPT=73562)     COMPARISON: None. INDICATIONS: Right anterior knee pain and swelling for 1 month. TECHNIQUE: 3 weight-bearing views were obtained. FINDINGS:   BONES: No acute fracture dislocation.   Aloha Frieze laterality  Effusion of bursa of left knee    Lm Brandt MD  Physical Medicine and Rehabilitation/Sports Medicine  [facility]

## 2021-07-12 ENCOUNTER — LAB ENCOUNTER (OUTPATIENT)
Dept: LAB | Facility: HOSPITAL | Age: 76
End: 2021-07-12
Attending: INTERNAL MEDICINE
Payer: MEDICARE

## 2021-07-12 DIAGNOSIS — K51.90 ULCERATIVE COLITIS WITHOUT COMPLICATIONS, UNSPECIFIED LOCATION (HCC): ICD-10-CM

## 2021-07-12 LAB
ALBUMIN SERPL-MCNC: 3.9 G/DL (ref 3.4–5)
ALBUMIN/GLOB SERPL: 1.1 {RATIO} (ref 1–2)
ALP LIVER SERPL-CCNC: 71 U/L
ALT SERPL-CCNC: 26 U/L
ANION GAP SERPL CALC-SCNC: 5 MMOL/L (ref 0–18)
AST SERPL-CCNC: 21 U/L (ref 15–37)
BASOPHILS # BLD AUTO: 0.07 X10(3) UL (ref 0–0.2)
BASOPHILS NFR BLD AUTO: 0.9 %
BILIRUB SERPL-MCNC: 0.5 MG/DL (ref 0.1–2)
BUN BLD-MCNC: 13 MG/DL (ref 7–18)
BUN/CREAT SERPL: 12.7 (ref 10–20)
CALCIUM BLD-MCNC: 9.6 MG/DL (ref 8.5–10.1)
CHLORIDE SERPL-SCNC: 105 MMOL/L (ref 98–112)
CO2 SERPL-SCNC: 30 MMOL/L (ref 21–32)
CREAT BLD-MCNC: 1.02 MG/DL
DEPRECATED RDW RBC AUTO: 43.8 FL (ref 35.1–46.3)
EOSINOPHIL # BLD AUTO: 0.22 X10(3) UL (ref 0–0.7)
EOSINOPHIL NFR BLD AUTO: 2.8 %
ERYTHROCYTE [DISTWIDTH] IN BLOOD BY AUTOMATED COUNT: 12.8 % (ref 11–15)
FOLATE SERPL-MCNC: 13.9 NG/ML (ref 8.7–?)
GLOBULIN PLAS-MCNC: 3.5 G/DL (ref 2.8–4.4)
GLUCOSE BLD-MCNC: 89 MG/DL (ref 70–99)
HCT VFR BLD AUTO: 47.5 %
HGB BLD-MCNC: 15.4 G/DL
IMM GRANULOCYTES # BLD AUTO: 0.02 X10(3) UL (ref 0–1)
IMM GRANULOCYTES NFR BLD: 0.3 %
LYMPHOCYTES # BLD AUTO: 2.96 X10(3) UL (ref 1–4)
LYMPHOCYTES NFR BLD AUTO: 37.6 %
M PROTEIN MFR SERPL ELPH: 7.4 G/DL (ref 6.4–8.2)
MCH RBC QN AUTO: 30 PG (ref 26–34)
MCHC RBC AUTO-ENTMCNC: 32.4 G/DL (ref 31–37)
MCV RBC AUTO: 92.4 FL
MONOCYTES # BLD AUTO: 0.61 X10(3) UL (ref 0.1–1)
MONOCYTES NFR BLD AUTO: 7.8 %
NEUTROPHILS # BLD AUTO: 3.99 X10 (3) UL (ref 1.5–7.7)
NEUTROPHILS # BLD AUTO: 3.99 X10(3) UL (ref 1.5–7.7)
NEUTROPHILS NFR BLD AUTO: 50.6 %
OSMOLALITY SERPL CALC.SUM OF ELEC: 290 MOSM/KG (ref 275–295)
PATIENT FASTING Y/N/NP: NO
PLATELET # BLD AUTO: 229 10(3)UL (ref 150–450)
POTASSIUM SERPL-SCNC: 3.9 MMOL/L (ref 3.5–5.1)
RBC # BLD AUTO: 5.14 X10(6)UL
SODIUM SERPL-SCNC: 140 MMOL/L (ref 136–145)
VIT B12 SERPL-MCNC: 677 PG/ML (ref 193–986)
WBC # BLD AUTO: 7.9 X10(3) UL (ref 4–11)

## 2021-07-12 PROCEDURE — 80053 COMPREHEN METABOLIC PANEL: CPT

## 2021-07-12 PROCEDURE — 82306 VITAMIN D 25 HYDROXY: CPT

## 2021-07-12 PROCEDURE — 82607 VITAMIN B-12: CPT

## 2021-07-12 PROCEDURE — 82746 ASSAY OF FOLIC ACID SERUM: CPT

## 2021-07-12 PROCEDURE — 36415 COLL VENOUS BLD VENIPUNCTURE: CPT

## 2021-07-12 PROCEDURE — 85025 COMPLETE CBC W/AUTO DIFF WBC: CPT

## 2021-07-14 LAB — 25(OH)D3 SERPL-MCNC: 54.6 NG/ML (ref 30–100)

## 2021-07-22 NOTE — PROGRESS NOTES
GI please contact patient. Labs including CBC, CMP, Vitamin D, Vitamin S27 and Folic Acid in normal range. Continue on current regimen.

## 2021-08-13 ENCOUNTER — TELEPHONE (OUTPATIENT)
Dept: NEUROLOGY | Facility: CLINIC | Age: 76
End: 2021-08-13

## 2021-08-13 DIAGNOSIS — M22.2X9 PATELLOFEMORAL PAIN SYNDROME, UNSPECIFIED LATERALITY: ICD-10-CM

## 2021-08-13 DIAGNOSIS — M17.12 PRIMARY OSTEOARTHRITIS OF LEFT KNEE: Primary | ICD-10-CM

## 2021-08-13 NOTE — TELEPHONE ENCOUNTER
Contacted [facility] online Decision ID #:[id number] to initiate authorization for Left knee CSI and aspiration CPT 36800+ to be done in office.     Status: Approved-Notification or Prior Authorization is not required for the requested services valid 8/13/21-

## 2021-08-13 NOTE — TELEPHONE ENCOUNTER
Patient's left knee csi order dropped and scheduled for 8/16/21 at 3pm per Dr. Angela Fang request- informed patient

## 2021-08-13 NOTE — TELEPHONE ENCOUNTER
Patient called saying he is having trouble  with his knee. He is wondering if he can get scheduled for   an injection as he has had in the past.  Please call to advise.

## 2021-08-13 NOTE — TELEPHONE ENCOUNTER
S/w patient in regards of having pain in both knees, 3 months ago he had a CSI/HA with much improvement. Patient is now complaining for bilateral knee pain, unbearable. Patient is unaware what to do.   Patient was offered an appointment but denied it due

## 2021-08-16 ENCOUNTER — MED REC SCAN ONLY (OUTPATIENT)
Dept: NEUROLOGY | Facility: CLINIC | Age: 76
End: 2021-08-16

## 2021-08-16 ENCOUNTER — OFFICE VISIT (OUTPATIENT)
Dept: NEUROLOGY | Facility: CLINIC | Age: 76
End: 2021-08-16
Payer: COMMERCIAL

## 2021-08-16 DIAGNOSIS — M22.2X9 PATELLOFEMORAL PAIN SYNDROME, UNSPECIFIED LATERALITY: ICD-10-CM

## 2021-08-16 DIAGNOSIS — M25.462 EFFUSION OF BURSA OF LEFT KNEE: ICD-10-CM

## 2021-08-16 DIAGNOSIS — M17.12 PRIMARY OSTEOARTHRITIS OF LEFT KNEE: Primary | ICD-10-CM

## 2021-08-16 PROCEDURE — 20611 DRAIN/INJ JOINT/BURSA W/US: CPT | Performed by: PHYSICAL MEDICINE & REHABILITATION

## 2021-08-16 PROCEDURE — 99213 OFFICE O/P EST LOW 20 MIN: CPT | Performed by: PHYSICAL MEDICINE & REHABILITATION

## 2021-08-16 RX ORDER — LIDOCAINE HYDROCHLORIDE 10 MG/ML
9 INJECTION, SOLUTION INFILTRATION; PERINEURAL ONCE
Status: COMPLETED | OUTPATIENT
Start: 2021-08-16 | End: 2021-08-16

## 2021-08-16 RX ORDER — TRIAMCINOLONE ACETONIDE 40 MG/ML
40 INJECTION, SUSPENSION INTRA-ARTICULAR; INTRAMUSCULAR ONCE
Status: COMPLETED | OUTPATIENT
Start: 2021-08-16 | End: 2021-08-16

## 2021-08-16 NOTE — PROCEDURES
[address]   Knee Joint Injection Procedure Note    CHIEF COMPLAINT:  Patient presents with:   Injection: Left knee CSI      PROCEDURE PERFORMED:  LEFT knee intra-articular corticosteroid injection under ultr 13.0 - 17.5 g/dL Final   • HCT 07/12/2021 47.5  39.0 - 53.0 % Final   • MCV 07/12/2021 92.4  80.0 - 100.0 fL Final   • MCH 07/12/2021 30.0  26.0 - 34.0 pg Final   • MCHC 07/12/2021 32.4  31.0 - 37.0 g/dL Final   • RDW-SD 07/12/2021 43.8  35.1 - 46.3 fL Fin mixture of 4 cc 1% lidocaine and 1 cc of Kenalog containing 40 mg of corticosteroid was visualized being injected into the intra-articular space. The needle was then removed, hemostasis was obtained, Band-Aid was applied.   Patient tolerated the procedure w

## 2021-08-16 NOTE — PROGRESS NOTES
[address]  Progress Note    CHIEF COMPLAINT:  Patient presents with: Injection: Left knee CSI      History of Present Illness:   The patient is a 78year old right-handed male with no significant past medic Dispense Refill   • mesalamine 1.2 g Oral Tab EC Take 4 tablets (4.8 g total) by mouth daily.  4 tabs PO daily 360 tablet 3   • FLUAD QUADRIVALENT 0.5 ML Intramuscular Prefilled Syringe      • Clobetasol Propionate (CLOBEX) 0.05 % External Lotion Apply 1 Ap Sodium 07/12/2021 140  136 - 145 mmol/L Final   • Potassium 07/12/2021 3.9  3.5 - 5.1 mmol/L Final   • Chloride 07/12/2021 105  98 - 112 mmol/L Final   • CO2 07/12/2021 30.0  21.0 - 32.0 mmol/L Final   • Anion Gap 07/12/2021 5  0 - 18 mmol/L Final   • BUN - 0.70 x10(3) uL Final   • Basophil Absolute 07/12/2021 0.07  0.00 - 0.20 x10(3) uL Final   • Immature Granulocyte Absolute 07/12/2021 0.02  0.00 - 1.00 x10(3) uL Final   • Neutrophil % 07/12/2021 50.6  % Final   • Lymphocyte % 07/12/2021 37.6  % Final   • ultrasound guidance. RTC in 10 weeks  Discharge Instructions were provided as documented in AVS summary. The patient was in agreement with the assessment and plan. All questions were answered. There were no barriers to learning.        Primary oste

## 2021-08-16 NOTE — PATIENT INSTRUCTIONS
1) Continue with your home exercise program  2) Tylenol 500-1000 mg every 6-8 hours as needed for pain. No more than 3000 mg daily.   3) Follow up with me 10 weeks and we can decide on repeating gel and steroid injections     Post Injection Instructions

## 2021-08-25 ENCOUNTER — OFFICE VISIT (OUTPATIENT)
Dept: INTERNAL MEDICINE CLINIC | Facility: CLINIC | Age: 76
End: 2021-08-25
Payer: COMMERCIAL

## 2021-08-25 VITALS
OXYGEN SATURATION: 99 % | HEART RATE: 87 BPM | RESPIRATION RATE: 18 BRPM | BODY MASS INDEX: 18.66 KG/M2 | SYSTOLIC BLOOD PRESSURE: 128 MMHG | WEIGHT: 126 LBS | TEMPERATURE: 98 F | HEIGHT: 69 IN | DIASTOLIC BLOOD PRESSURE: 76 MMHG

## 2021-08-25 DIAGNOSIS — K40.90 INGUINAL HERNIA, RIGHT: ICD-10-CM

## 2021-08-25 DIAGNOSIS — E78.5 HYPERLIPIDEMIA, UNSPECIFIED HYPERLIPIDEMIA TYPE: ICD-10-CM

## 2021-08-25 DIAGNOSIS — Z12.5 PROSTATE CANCER SCREENING: ICD-10-CM

## 2021-08-25 DIAGNOSIS — Z00.00 MEDICARE ANNUAL WELLNESS VISIT, SUBSEQUENT: Primary | ICD-10-CM

## 2021-08-25 DIAGNOSIS — K51.90 ULCERATIVE COLITIS WITHOUT COMPLICATIONS, UNSPECIFIED LOCATION (HCC): ICD-10-CM

## 2021-08-25 DIAGNOSIS — Z00.00 ENCOUNTER FOR ANNUAL HEALTH EXAMINATION: ICD-10-CM

## 2021-08-25 DIAGNOSIS — I83.92 ASYMPTOMATIC VARICOSE VEINS OF LEFT LOWER EXTREMITY: ICD-10-CM

## 2021-08-25 DIAGNOSIS — H40.10X1 OPEN-ANGLE GLAUCOMA OF BOTH EYES, MILD STAGE, UNSPECIFIED OPEN-ANGLE GLAUCOMA TYPE: ICD-10-CM

## 2021-08-25 DIAGNOSIS — M17.12 PRIMARY OSTEOARTHRITIS OF LEFT KNEE: ICD-10-CM

## 2021-08-25 DIAGNOSIS — Z98.890 HISTORY OF HERNIA REPAIR: ICD-10-CM

## 2021-08-25 DIAGNOSIS — M81.0 OSTEOPOROSIS, UNSPECIFIED OSTEOPOROSIS TYPE, UNSPECIFIED PATHOLOGICAL FRACTURE PRESENCE: ICD-10-CM

## 2021-08-25 DIAGNOSIS — Z87.19 HISTORY OF HERNIA REPAIR: ICD-10-CM

## 2021-08-25 PROCEDURE — 99397 PER PM REEVAL EST PAT 65+ YR: CPT | Performed by: INTERNAL MEDICINE

## 2021-08-25 PROCEDURE — 3074F SYST BP LT 130 MM HG: CPT | Performed by: INTERNAL MEDICINE

## 2021-08-25 PROCEDURE — G0439 PPPS, SUBSEQ VISIT: HCPCS | Performed by: INTERNAL MEDICINE

## 2021-08-25 PROCEDURE — 3078F DIAST BP <80 MM HG: CPT | Performed by: INTERNAL MEDICINE

## 2021-08-25 PROCEDURE — 96160 PT-FOCUSED HLTH RISK ASSMT: CPT | Performed by: INTERNAL MEDICINE

## 2021-08-25 PROCEDURE — 3008F BODY MASS INDEX DOCD: CPT | Performed by: INTERNAL MEDICINE

## 2021-08-29 NOTE — PATIENT INSTRUCTIONS
Kelley Cagle Jr.'s SCREENING SCHEDULE   Tests on this list are recommended by your physician but may not be covered, or covered at this frequency, by your insurer. Please check with your insurance carrier before scheduling to verify coverage.    PRE [id number]) covered once after 65 Prevnar 13: 07/27/2020    [id number]: -     Pneumococcal Vaccination(2 of 2 - PPSV23) due on 07/27/2021    Hepatitis B One screening covered for patients with certain risk factors   -  No recommendations at this time

## 2021-08-29 NOTE — PROGRESS NOTES
HPI:   Adolph De La Rosa. is a 78year old male who presents for a Medicare Subsequent Annual Wellness visit (Pt already had Initial Annual Wellness).     Patient comes in for Medicare annual physical overall he is doing okay his knee pain is improved he Active Problem List:     Glaucoma     Ulcerative colitis ([facility] [address]     Blepharitis     Myopia     Superficial injury of cornea     Spigelian hernia     Effusion of bursa of left knee     Patellofemoral pain syndrome, unspecified laterality     Primary osteoart Cardiovascular: Negative. Negative for chest pain, palpitations and leg swelling. Gastrointestinal: Negative. Endocrine: Negative for cold intolerance and heat intolerance. Genitourinary: Negative. Negative for dysuria, flank pain and hematuria. annoyed because I misunderstand what they say: No   I misunderstand what others are saying and make inappropriate responses: No I avoid social activities because I cannot hear well and fear I will reply improperly: No   Family members and friends have told 10/11/2019   • Fluzone Vaccine Medicare () 10/06/2015, 12/10/2016, 12/07/2017   • Pneumococcal (Prevnar 13) 07/27/2020        ASSESSMENT AND OTHER RELEVANT CHRONIC CONDITIONS:   Fred Moran. is a 78year old male who presents for a Medicare As REFLEX TO FREE T4; Future  -     URINALYSIS, ROUTINE; Future    Inguinal hernia, right follow with general surgery any worsening symptoms let us know  -     SURGERY - INTERNAL  -     LIPID PANEL;  Future  -     TSH W REFLEX TO FREE T4; Future  -     Fleeta Prude to Medicare, and non-screening if indicated for medical reasons 07/27/2020      Ultrasound Screening for Abdominal Aortic Aneurysm (AAA) Covered once in a lifetime for one of the following risk factors   • Men who are 73-68 years old and have ever smoked

## 2021-08-30 ENCOUNTER — LAB ENCOUNTER (OUTPATIENT)
Dept: LAB | Facility: HOSPITAL | Age: 76
End: 2021-08-30
Attending: INTERNAL MEDICINE
Payer: MEDICARE

## 2021-08-30 DIAGNOSIS — E78.5 HYPERLIPIDEMIA, UNSPECIFIED HYPERLIPIDEMIA TYPE: ICD-10-CM

## 2021-08-30 DIAGNOSIS — K51.90 ULCERATIVE COLITIS WITHOUT COMPLICATIONS, UNSPECIFIED LOCATION (HCC): ICD-10-CM

## 2021-08-30 DIAGNOSIS — M81.0 OSTEOPOROSIS, UNSPECIFIED OSTEOPOROSIS TYPE, UNSPECIFIED PATHOLOGICAL FRACTURE PRESENCE: ICD-10-CM

## 2021-08-30 DIAGNOSIS — I83.92 ASYMPTOMATIC VARICOSE VEINS OF LEFT LOWER EXTREMITY: ICD-10-CM

## 2021-08-30 DIAGNOSIS — Z00.00 MEDICARE ANNUAL WELLNESS VISIT, SUBSEQUENT: ICD-10-CM

## 2021-08-30 DIAGNOSIS — Z12.5 PROSTATE CANCER SCREENING: ICD-10-CM

## 2021-08-30 DIAGNOSIS — Z87.19 HISTORY OF HERNIA REPAIR: ICD-10-CM

## 2021-08-30 DIAGNOSIS — M17.12 PRIMARY OSTEOARTHRITIS OF LEFT KNEE: ICD-10-CM

## 2021-08-30 DIAGNOSIS — H40.10X1 OPEN-ANGLE GLAUCOMA OF BOTH EYES, MILD STAGE, UNSPECIFIED OPEN-ANGLE GLAUCOMA TYPE: ICD-10-CM

## 2021-08-30 DIAGNOSIS — K40.90 INGUINAL HERNIA, RIGHT: ICD-10-CM

## 2021-08-30 DIAGNOSIS — Z98.890 HISTORY OF HERNIA REPAIR: ICD-10-CM

## 2021-08-30 LAB
BILIRUB UR QL: NEGATIVE
CHOLEST SMN-MCNC: 163 MG/DL (ref ?–200)
CLARITY UR: CLEAR
COLOR UR: YELLOW
GLUCOSE UR-MCNC: NEGATIVE MG/DL
HDLC SERPL-MCNC: 67 MG/DL (ref 40–59)
HGB UR QL STRIP.AUTO: NEGATIVE
KETONES UR-MCNC: NEGATIVE MG/DL
LDLC SERPL CALC-MCNC: 83 MG/DL (ref ?–100)
NITRITE UR QL STRIP.AUTO: NEGATIVE
NONHDLC SERPL-MCNC: 96 MG/DL (ref ?–130)
PATIENT FASTING Y/N/NP: YES
PH UR: 5 [PH] (ref 5–8)
PROT UR-MCNC: NEGATIVE MG/DL
SP GR UR STRIP: 1.02 (ref 1–1.03)
TRIGL SERPL-MCNC: 68 MG/DL (ref 30–149)
TSI SER-ACNC: 1.53 MIU/ML (ref 0.36–3.74)
UROBILINOGEN UR STRIP-ACNC: <2
VLDLC SERPL CALC-MCNC: 11 MG/DL (ref 0–30)

## 2021-08-30 PROCEDURE — 81001 URINALYSIS AUTO W/SCOPE: CPT

## 2021-08-30 PROCEDURE — 80061 LIPID PANEL: CPT

## 2021-08-30 PROCEDURE — 84443 ASSAY THYROID STIM HORMONE: CPT

## 2021-08-30 PROCEDURE — 36415 COLL VENOUS BLD VENIPUNCTURE: CPT

## 2021-09-08 ENCOUNTER — TELEPHONE (OUTPATIENT)
Dept: INTERNAL MEDICINE CLINIC | Facility: CLINIC | Age: 76
End: 2021-09-08

## 2021-09-28 ENCOUNTER — NURSE TRIAGE (OUTPATIENT)
Dept: INTERNAL MEDICINE CLINIC | Facility: CLINIC | Age: 76
End: 2021-09-28

## 2021-09-28 NOTE — TELEPHONE ENCOUNTER
Action Requested: Summary for Provider     []  Critical Lab, Recommendations Needed  [] Need Additional Advice  []   FYI    []   Need Orders  [] Need Medications Sent to Pharmacy  []  Other     SUMMARY: Per protocol advised : OV within 3 days    Future Eva

## 2021-09-29 ENCOUNTER — OFFICE VISIT (OUTPATIENT)
Dept: INTERNAL MEDICINE CLINIC | Facility: CLINIC | Age: 76
End: 2021-09-29
Payer: COMMERCIAL

## 2021-09-29 VITALS
RESPIRATION RATE: 17 BRPM | OXYGEN SATURATION: 99 % | HEART RATE: 76 BPM | DIASTOLIC BLOOD PRESSURE: 76 MMHG | WEIGHT: 129 LBS | BODY MASS INDEX: 19.11 KG/M2 | HEIGHT: 69 IN | TEMPERATURE: 98 F | SYSTOLIC BLOOD PRESSURE: 116 MMHG

## 2021-09-29 DIAGNOSIS — R19.5 ABNORMAL STOOL COLOR: ICD-10-CM

## 2021-09-29 DIAGNOSIS — R19.5 LOOSE STOOLS: Primary | ICD-10-CM

## 2021-09-29 PROCEDURE — 99214 OFFICE O/P EST MOD 30 MIN: CPT | Performed by: INTERNAL MEDICINE

## 2021-09-29 PROCEDURE — 3074F SYST BP LT 130 MM HG: CPT | Performed by: INTERNAL MEDICINE

## 2021-09-29 PROCEDURE — 3008F BODY MASS INDEX DOCD: CPT | Performed by: INTERNAL MEDICINE

## 2021-09-29 PROCEDURE — 3078F DIAST BP <80 MM HG: CPT | Performed by: INTERNAL MEDICINE

## 2021-09-29 NOTE — PROGRESS NOTES
Subjective:   Patient ID: Judy Scherer. is a 78year old male.     HPI  Patient comes in today with concerns about changing bowel habits and color is been going on for few days he had gone to a burger joint had a burger which tasted good did not have are equal, round, and reactive to light. Cardiovascular:      Rate and Rhythm: Normal rate and regular rhythm. Heart sounds: Normal heart sounds. Pulmonary:      Effort: Pulmonary effort is normal.      Breath sounds: Normal breath sounds.    Abdom

## 2021-09-30 ENCOUNTER — LAB ENCOUNTER (OUTPATIENT)
Dept: LAB | Facility: HOSPITAL | Age: 76
End: 2021-09-30
Attending: INTERNAL MEDICINE
Payer: MEDICARE

## 2021-09-30 DIAGNOSIS — R19.5 ABNORMAL FECES: Primary | ICD-10-CM

## 2021-09-30 DIAGNOSIS — R19.5 LOOSE STOOLS: ICD-10-CM

## 2021-09-30 DIAGNOSIS — R19.5 STOOL DISCOLORATION: ICD-10-CM

## 2021-09-30 DIAGNOSIS — R19.5 ABNORMAL STOOL COLOR: ICD-10-CM

## 2021-09-30 PROCEDURE — 87493 C DIFF AMPLIFIED PROBE: CPT

## 2021-09-30 PROCEDURE — 87045 FECES CULTURE AEROBIC BACT: CPT

## 2021-09-30 PROCEDURE — 87427 SHIGA-LIKE TOXIN AG IA: CPT

## 2021-09-30 PROCEDURE — 87046 STOOL CULTR AEROBIC BACT EA: CPT

## 2021-10-08 ENCOUNTER — TELEPHONE (OUTPATIENT)
Dept: INTERNAL MEDICINE CLINIC | Facility: CLINIC | Age: 76
End: 2021-10-08

## 2021-10-08 NOTE — TELEPHONE ENCOUNTER
Spent 25 minutes. Patient is very anxious, he gave all his medical information regarding his ulcerative colitis and the greenish stool. States that he is \"annoyed\" that we did not call him regarding his stool test result. States that he already read it

## 2021-10-08 NOTE — TELEPHONE ENCOUNTER
Pt calling back for a follow-up on below message. Pt is upset and very anxious that he still hasn't heard back from Dr Underwood/clinical staff yet. Advised the doctor was seeing pt's today until 3pm and another message will be sent to Dr Anali Henderson.     Over 20 min

## 2021-10-08 NOTE — TELEPHONE ENCOUNTER
Dr Anali Henderson sent a secure chat and he replied back that he will call the pt back in about 2 hours. Pt informed and very appreciative.

## 2021-11-01 ENCOUNTER — TELEPHONE (OUTPATIENT)
Dept: PHYSICAL MEDICINE AND REHAB | Facility: CLINIC | Age: 76
End: 2021-11-01

## 2021-11-01 ENCOUNTER — OFFICE VISIT (OUTPATIENT)
Dept: PHYSICAL MEDICINE AND REHAB | Facility: CLINIC | Age: 76
End: 2021-11-01
Payer: COMMERCIAL

## 2021-11-01 VITALS — HEIGHT: 69 IN | WEIGHT: 129 LBS | HEART RATE: 76 BPM | BODY MASS INDEX: 19.11 KG/M2 | OXYGEN SATURATION: 98 %

## 2021-11-01 DIAGNOSIS — M17.12 PRIMARY OSTEOARTHRITIS OF LEFT KNEE: ICD-10-CM

## 2021-11-01 DIAGNOSIS — M22.2X9 PATELLOFEMORAL PAIN SYNDROME, UNSPECIFIED LATERALITY: Primary | ICD-10-CM

## 2021-11-01 PROCEDURE — 3008F BODY MASS INDEX DOCD: CPT | Performed by: PHYSICAL MEDICINE & REHABILITATION

## 2021-11-01 PROCEDURE — 99214 OFFICE O/P EST MOD 30 MIN: CPT | Performed by: PHYSICAL MEDICINE & REHABILITATION

## 2021-11-01 NOTE — TELEPHONE ENCOUNTER
Initiated authorization for LEFT knee Durolane and CSI under ultrasound guidance   CPT +45428+ dx:M17.12 with Philippe Arndt at [facility]  Case #[id number].     Status: Approved-BU[employer]BILL-pre authorization is not required per health plan

## 2021-11-01 NOTE — PATIENT INSTRUCTIONS
1) My office will call you to schedule the LEFT knee HA and CSI under ultrasound guidance once the procedure is approved by your insurance carrier. This can be performed after November 16 2021.    2) Follow up with me 2 months after that  3) Continue with y

## 2021-11-01 NOTE — PROGRESS NOTES
[address]  Progress Note    CHIEF COMPLAINT:  Patient presents with:  Knee Pain: 08/16/21 left knee CSI. LOV 05/19/21. States the HEP is not helping. He walks daily and he thinks that helps him greatly.  Sta Yes        Partners: Male      FAMILY HISTORY:   Family History   Problem Relation Age of Onset   • High Blood Pressure Mother    • Other (Other) Mother         Abd aneurysm   • Cancer Father         colon        CURRENT MEDICATIONS:   Current Outpatient M alert & oriented x 3, attentive, able to follow 2 step commands, comprehention intact, spontaneous speech intact  Motor:    Musculoskeletal:    KNEE:  Inspection: no erythema, swelling, or obvious deformity  Palpation: Tender to palpation over the left med • Nitrite Urine 08/30/2021 Negative  Negative Final   • Leukocyte Esterase Urine 08/30/2021 Moderate* Negative Final   • WBC Urine 08/30/2021 6-10* 0 - 5 /HPF Final   • RBC Urine 08/30/2021 0-2  0 - 2 /HPF Final   • Bacteria Urine 08/30/2021 None Seen  N 11.0 - 15.0 % Final   • PLT 07/12/2021 229.0  150.0 - 450.0 10(3)uL Final   • Neutrophil Absolute Prelim 07/12/2021 3.99  1.50 - 7.70 x10 (3) uL Final   • Neutrophil Absolute 07/12/2021 3.99  1.50 - 7.70 x10(3) uL Final   • Lymphocyte Absolute 07/12/2021 2 patellofemoral syndrome and medial joint osteoarthritis. He has done very well with walking and has improved his function. He is also doing better with stairs.   I am recommending a left knee hyaluronic acid and corticosteroid injection which can be perfo

## 2021-11-23 ENCOUNTER — MED REC SCAN ONLY (OUTPATIENT)
Dept: PHYSICAL MEDICINE AND REHAB | Facility: CLINIC | Age: 76
End: 2021-11-23

## 2021-11-23 ENCOUNTER — OFFICE VISIT (OUTPATIENT)
Dept: PHYSICAL MEDICINE AND REHAB | Facility: CLINIC | Age: 76
End: 2021-11-23
Payer: COMMERCIAL

## 2021-11-23 DIAGNOSIS — M17.12 PRIMARY OSTEOARTHRITIS OF LEFT KNEE: ICD-10-CM

## 2021-11-23 DIAGNOSIS — M22.2X9 PATELLOFEMORAL PAIN SYNDROME, UNSPECIFIED LATERALITY: Primary | ICD-10-CM

## 2021-11-23 PROCEDURE — 20611 DRAIN/INJ JOINT/BURSA W/US: CPT | Performed by: PHYSICAL MEDICINE & REHABILITATION

## 2021-11-23 RX ORDER — TRIAMCINOLONE ACETONIDE 40 MG/ML
40 INJECTION, SUSPENSION INTRA-ARTICULAR; INTRAMUSCULAR ONCE
Status: COMPLETED | OUTPATIENT
Start: 2021-11-23 | End: 2021-11-23

## 2021-11-23 RX ORDER — LIDOCAINE HYDROCHLORIDE 10 MG/ML
7 INJECTION, SOLUTION INFILTRATION; PERINEURAL ONCE
Status: COMPLETED | OUTPATIENT
Start: 2021-11-23 | End: 2021-11-23

## 2021-11-23 NOTE — PROCEDURES
[address]   Knee Joint Injection Procedure Note    CHIEF COMPLAINT:  Patient presents with: Injection: Patient is here for a left knee monovisc and CSI under ultrasound guidance.        PROCEDURE PERFORMED: Urobilinogen Urine 08/30/2021 <2.0  <2.0 Final   • Nitrite Urine 08/30/2021 Negative  Negative Final   • Leukocyte Esterase Urine 08/30/2021 Moderate* Negative Final   • WBC Urine 08/30/2021 6-10* 0 - 5 /HPF Final   • RBC Urine 08/30/2021 0-2  0 - 2 /HPF F 35.1 - 46.3 fL Final   • RDW 07/12/2021 12.8  11.0 - 15.0 % Final   • PLT 07/12/2021 229.0  150.0 - 450.0 10(3)uL Final   • Neutrophil Absolute Prelim 07/12/2021 3.99  1.50 - 7.70 x10 (3) uL Final   • Neutrophil Absolute 07/12/2021 3.99  1.50 - 7.70 x10(3) the Durolane was noted to enter the intra-articular space. The Durolane syringe was then switched out for a syringe containing 2 cc of 1% lidocaine and 1 cc of 40 mg/cc triamcinolone which was injected into the same space.  The needle was then removed and h

## 2022-03-04 ENCOUNTER — TELEPHONE (OUTPATIENT)
Dept: SURGERY | Facility: CLINIC | Age: 77
End: 2022-03-04

## 2022-03-04 NOTE — TELEPHONE ENCOUNTER
RC @ 9:15 am   Requesting MRI or CT to be ordered prior to new consult. Advised patient the doctor is in surgery today and he will do exam on Monday at his appointment and will determine on that date if addl tests are required. No need for provider to call today. Consult sched for Monday 3-7-2022.      JOIE

## 2022-03-04 NOTE — TELEPHONE ENCOUNTER
Patient asking if any order is needed prior to his upcoming appointment on 3/7. Please call at [phone].

## 2022-03-07 ENCOUNTER — OFFICE VISIT (OUTPATIENT)
Dept: SURGERY | Facility: CLINIC | Age: 77
End: 2022-03-07
Payer: COMMERCIAL

## 2022-03-07 ENCOUNTER — LAB ENCOUNTER (OUTPATIENT)
Dept: LAB | Facility: HOSPITAL | Age: 77
End: 2022-03-07
Attending: SURGERY
Payer: MEDICARE

## 2022-03-07 DIAGNOSIS — K40.90 RIGHT INGUINAL HERNIA: ICD-10-CM

## 2022-03-07 DIAGNOSIS — K40.90 RIGHT INGUINAL HERNIA: Primary | ICD-10-CM

## 2022-03-07 LAB
ALBUMIN SERPL-MCNC: 3.9 G/DL (ref 3.4–5)
ALBUMIN/GLOB SERPL: 1.4 {RATIO} (ref 1–2)
ALP LIVER SERPL-CCNC: 64 U/L
ALT SERPL-CCNC: 22 U/L
ANION GAP SERPL CALC-SCNC: 5 MMOL/L (ref 0–18)
AST SERPL-CCNC: 18 U/L (ref 15–37)
BASOPHILS # BLD AUTO: 0.09 X10(3) UL (ref 0–0.2)
BASOPHILS NFR BLD AUTO: 1.2 %
BILIRUB SERPL-MCNC: 0.4 MG/DL (ref 0.1–2)
BUN BLD-MCNC: 18 MG/DL (ref 7–18)
BUN/CREAT SERPL: 16.4 (ref 10–20)
CALCIUM BLD-MCNC: 9.5 MG/DL (ref 8.5–10.1)
CHLORIDE SERPL-SCNC: 104 MMOL/L (ref 98–112)
CREAT BLD-MCNC: 1.1 MG/DL
DEPRECATED RDW RBC AUTO: 46.6 FL (ref 35.1–46.3)
EOSINOPHIL # BLD AUTO: 0.24 X10(3) UL (ref 0–0.7)
ERYTHROCYTE [DISTWIDTH] IN BLOOD BY AUTOMATED COUNT: 13.3 % (ref 11–15)
FASTING STATUS PATIENT QL REPORTED: NO
GLOBULIN PLAS-MCNC: 2.8 G/DL (ref 2.8–4.4)
GLUCOSE BLD-MCNC: 96 MG/DL (ref 70–99)
HCT VFR BLD AUTO: 47.2 %
HGB BLD-MCNC: 14.9 G/DL
IMM GRANULOCYTES # BLD AUTO: 0.01 X10(3) UL (ref 0–1)
IMM GRANULOCYTES NFR BLD: 0.1 %
LYMPHOCYTES # BLD AUTO: 3.08 X10(3) UL (ref 1–4)
LYMPHOCYTES NFR BLD AUTO: 41.2 %
MCH RBC QN AUTO: 30 PG (ref 26–34)
MCHC RBC AUTO-ENTMCNC: 31.6 G/DL (ref 31–37)
MCV RBC AUTO: 95.2 FL
MONOCYTES # BLD AUTO: 0.65 X10(3) UL (ref 0.1–1)
MONOCYTES NFR BLD AUTO: 8.7 %
NEUTROPHILS # BLD AUTO: 3.41 X10 (3) UL (ref 1.5–7.7)
NEUTROPHILS # BLD AUTO: 3.41 X10(3) UL (ref 1.5–7.7)
NEUTROPHILS NFR BLD AUTO: 45.6 %
OSMOLALITY SERPL CALC.SUM OF ELEC: 292 MOSM/KG (ref 275–295)
PLATELET # BLD AUTO: 221 10(3)UL (ref 150–450)
POTASSIUM SERPL-SCNC: 4.6 MMOL/L (ref 3.5–5.1)
PROT SERPL-MCNC: 6.7 G/DL (ref 6.4–8.2)
RBC # BLD AUTO: 4.96 X10(6)UL
SODIUM SERPL-SCNC: 140 MMOL/L (ref 136–145)
WBC # BLD AUTO: 7.5 X10(3) UL (ref 4–11)

## 2022-03-07 PROCEDURE — 99214 OFFICE O/P EST MOD 30 MIN: CPT | Performed by: SURGERY

## 2022-03-07 PROCEDURE — 36415 COLL VENOUS BLD VENIPUNCTURE: CPT

## 2022-03-07 PROCEDURE — 85025 COMPLETE CBC W/AUTO DIFF WBC: CPT

## 2022-03-07 PROCEDURE — 80053 COMPREHEN METABOLIC PANEL: CPT

## 2022-03-07 PROCEDURE — 93010 ELECTROCARDIOGRAM REPORT: CPT | Performed by: SURGERY

## 2022-03-07 PROCEDURE — 93005 ELECTROCARDIOGRAM TRACING: CPT

## 2022-03-07 NOTE — PATIENT INSTRUCTIONS
Obtain preoperative testing.     Plan outpatient repair of right inguinal hernia at [facility].  Same day surgery to call the day before with instructions    Avoid aspirin or NSAIDs for 5 days prior to surgery

## 2022-03-30 ENCOUNTER — TELEPHONE (OUTPATIENT)
Dept: SURGERY | Facility: CLINIC | Age: 77
End: 2022-03-30

## 2022-03-30 NOTE — TELEPHONE ENCOUNTER
PC to patient,  Answered all his questions. Patient does NOT want codeine after his surgery. I will pass this message to GLMD.  Patient is informed he will have the opportunity to talk to the doctor before surgery.       JOIE

## 2022-03-30 NOTE — TELEPHONE ENCOUNTER
Per pt is scheduled for surgery and states the pain medication that will be prescribed after surgery cannot have codeine because it causes him severe constipation.  Pt asking for a call back

## 2022-04-04 ENCOUNTER — LAB ENCOUNTER (OUTPATIENT)
Dept: LAB | Facility: HOSPITAL | Age: 77
End: 2022-04-04
Attending: SURGERY
Payer: MEDICARE

## 2022-04-04 DIAGNOSIS — Z01.818 PRE-OP TESTING: ICD-10-CM

## 2022-04-05 LAB — SARS-COV-2 RNA RESP QL NAA+PROBE: NOT DETECTED

## 2022-04-06 ENCOUNTER — ANESTHESIA EVENT (OUTPATIENT)
Dept: SURGERY | Facility: HOSPITAL | Age: 77
End: 2022-04-06
Payer: MEDICARE

## 2022-04-07 ENCOUNTER — ANESTHESIA (OUTPATIENT)
Dept: SURGERY | Facility: HOSPITAL | Age: 77
End: 2022-04-07
Payer: MEDICARE

## 2022-04-07 ENCOUNTER — HOSPITAL ENCOUNTER (OUTPATIENT)
Facility: HOSPITAL | Age: 77
Setting detail: HOSPITAL OUTPATIENT SURGERY
Discharge: HOME | End: 2022-04-07
Attending: SURGERY | Admitting: SURGERY
Payer: MEDICARE

## 2022-04-07 VITALS
DIASTOLIC BLOOD PRESSURE: 62 MMHG | SYSTOLIC BLOOD PRESSURE: 119 MMHG | BODY MASS INDEX: 19.11 KG/M2 | HEART RATE: 66 BPM | HEIGHT: 69 IN | RESPIRATION RATE: 14 BRPM | TEMPERATURE: 97 F | WEIGHT: 129 LBS | OXYGEN SATURATION: 100 %

## 2022-04-07 DIAGNOSIS — Z01.818 PRE-OP TESTING: Primary | ICD-10-CM

## 2022-04-07 DIAGNOSIS — K40.90 RIGHT INGUINAL HERNIA: ICD-10-CM

## 2022-04-07 PROCEDURE — 0YU54JZ SUPPLEMENT RIGHT INGUINAL REGION WITH SYNTHETIC SUBSTITUTE, PERCUTANEOUS ENDOSCOPIC APPROACH: ICD-10-PCS | Performed by: SURGERY

## 2022-04-07 PROCEDURE — 8E0W4CZ ROBOTIC ASSISTED PROCEDURE OF TRUNK REGION, PERCUTANEOUS ENDOSCOPIC APPROACH: ICD-10-PCS | Performed by: SURGERY

## 2022-04-07 DEVICE — PROGRIP MESH: Type: IMPLANTABLE DEVICE | Site: PELVIS | Status: FUNCTIONAL

## 2022-04-07 RX ORDER — MORPHINE SULFATE 10 MG/ML
6 INJECTION, SOLUTION INTRAMUSCULAR; INTRAVENOUS EVERY 10 MIN PRN
Status: DISCONTINUED | OUTPATIENT
Start: 2022-04-07 | End: 2022-04-07

## 2022-04-07 RX ORDER — MORPHINE SULFATE 4 MG/ML
2 INJECTION, SOLUTION INTRAMUSCULAR; INTRAVENOUS EVERY 10 MIN PRN
Status: DISCONTINUED | OUTPATIENT
Start: 2022-04-07 | End: 2022-04-07

## 2022-04-07 RX ORDER — EPHEDRINE SULFATE 50 MG/ML
INJECTION, SOLUTION INTRAVENOUS AS NEEDED
Status: DISCONTINUED | OUTPATIENT
Start: 2022-04-07 | End: 2022-04-07 | Stop reason: SURG

## 2022-04-07 RX ORDER — ONDANSETRON 2 MG/ML
INJECTION INTRAMUSCULAR; INTRAVENOUS AS NEEDED
Status: DISCONTINUED | OUTPATIENT
Start: 2022-04-07 | End: 2022-04-07 | Stop reason: SURG

## 2022-04-07 RX ORDER — DEXAMETHASONE SODIUM PHOSPHATE 4 MG/ML
VIAL (ML) INJECTION AS NEEDED
Status: DISCONTINUED | OUTPATIENT
Start: 2022-04-07 | End: 2022-04-07 | Stop reason: SURG

## 2022-04-07 RX ORDER — IBUPROFEN 600 MG/1
600 TABLET ORAL EVERY 6 HOURS PRN
Qty: 15 TABLET | Refills: 1 | Status: SHIPPED | OUTPATIENT
Start: 2022-04-07 | End: 2022-04-14

## 2022-04-07 RX ORDER — HYDROCODONE BITARTRATE AND ACETAMINOPHEN 5; 325 MG/1; MG/1
2 TABLET ORAL AS NEEDED
Status: DISCONTINUED | OUTPATIENT
Start: 2022-04-07 | End: 2022-04-07

## 2022-04-07 RX ORDER — CEFAZOLIN SODIUM/WATER 2 G/20 ML
2 SYRINGE (ML) INTRAVENOUS ONCE
Status: COMPLETED | OUTPATIENT
Start: 2022-04-07 | End: 2022-04-07

## 2022-04-07 RX ORDER — HYDROCODONE BITARTRATE AND ACETAMINOPHEN 5; 325 MG/1; MG/1
1 TABLET ORAL AS NEEDED
Status: DISCONTINUED | OUTPATIENT
Start: 2022-04-07 | End: 2022-04-07

## 2022-04-07 RX ORDER — ONDANSETRON 2 MG/ML
4 INJECTION INTRAMUSCULAR; INTRAVENOUS ONCE AS NEEDED
Status: DISCONTINUED | OUTPATIENT
Start: 2022-04-07 | End: 2022-04-07

## 2022-04-07 RX ORDER — LIDOCAINE HYDROCHLORIDE 10 MG/ML
INJECTION, SOLUTION EPIDURAL; INFILTRATION; INTRACAUDAL; PERINEURAL AS NEEDED
Status: DISCONTINUED | OUTPATIENT
Start: 2022-04-07 | End: 2022-04-07 | Stop reason: SURG

## 2022-04-07 RX ORDER — HYDROMORPHONE HYDROCHLORIDE 1 MG/ML
0.6 INJECTION, SOLUTION INTRAMUSCULAR; INTRAVENOUS; SUBCUTANEOUS EVERY 5 MIN PRN
Status: DISCONTINUED | OUTPATIENT
Start: 2022-04-07 | End: 2022-04-07

## 2022-04-07 RX ORDER — NALOXONE HYDROCHLORIDE 0.4 MG/ML
80 INJECTION, SOLUTION INTRAMUSCULAR; INTRAVENOUS; SUBCUTANEOUS AS NEEDED
Status: DISCONTINUED | OUTPATIENT
Start: 2022-04-07 | End: 2022-04-07

## 2022-04-07 RX ORDER — SODIUM CHLORIDE, SODIUM LACTATE, POTASSIUM CHLORIDE, CALCIUM CHLORIDE 600; 310; 30; 20 MG/100ML; MG/100ML; MG/100ML; MG/100ML
INJECTION, SOLUTION INTRAVENOUS CONTINUOUS
Status: DISCONTINUED | OUTPATIENT
Start: 2022-04-07 | End: 2022-04-07

## 2022-04-07 RX ORDER — ACETAMINOPHEN 500 MG
1000 TABLET ORAL ONCE
Status: COMPLETED | OUTPATIENT
Start: 2022-04-07 | End: 2022-04-07

## 2022-04-07 RX ORDER — BUPIVACAINE HYDROCHLORIDE 2.5 MG/ML
INJECTION, SOLUTION EPIDURAL; INFILTRATION; INTRACAUDAL AS NEEDED
Status: DISCONTINUED | OUTPATIENT
Start: 2022-04-07 | End: 2022-04-07 | Stop reason: HOSPADM

## 2022-04-07 RX ORDER — ROCURONIUM BROMIDE 10 MG/ML
INJECTION, SOLUTION INTRAVENOUS AS NEEDED
Status: DISCONTINUED | OUTPATIENT
Start: 2022-04-07 | End: 2022-04-07 | Stop reason: SURG

## 2022-04-07 RX ORDER — PROCHLORPERAZINE EDISYLATE 5 MG/ML
5 INJECTION INTRAMUSCULAR; INTRAVENOUS ONCE AS NEEDED
Status: DISCONTINUED | OUTPATIENT
Start: 2022-04-07 | End: 2022-04-07

## 2022-04-07 RX ORDER — MORPHINE SULFATE 4 MG/ML
4 INJECTION, SOLUTION INTRAMUSCULAR; INTRAVENOUS EVERY 10 MIN PRN
Status: DISCONTINUED | OUTPATIENT
Start: 2022-04-07 | End: 2022-04-07

## 2022-04-07 RX ORDER — HYDROMORPHONE HYDROCHLORIDE 1 MG/ML
0.2 INJECTION, SOLUTION INTRAMUSCULAR; INTRAVENOUS; SUBCUTANEOUS EVERY 5 MIN PRN
Status: DISCONTINUED | OUTPATIENT
Start: 2022-04-07 | End: 2022-04-07

## 2022-04-07 RX ORDER — HYDROMORPHONE HYDROCHLORIDE 1 MG/ML
0.4 INJECTION, SOLUTION INTRAMUSCULAR; INTRAVENOUS; SUBCUTANEOUS EVERY 5 MIN PRN
Status: DISCONTINUED | OUTPATIENT
Start: 2022-04-07 | End: 2022-04-07

## 2022-04-07 RX ADMIN — ROCURONIUM BROMIDE 40 MG: 10 INJECTION, SOLUTION INTRAVENOUS at 07:35:00

## 2022-04-07 RX ADMIN — SODIUM CHLORIDE, SODIUM LACTATE, POTASSIUM CHLORIDE, CALCIUM CHLORIDE: 600; 310; 30; 20 INJECTION, SOLUTION INTRAVENOUS at 07:28:00

## 2022-04-07 RX ADMIN — ONDANSETRON 4 MG: 2 INJECTION INTRAMUSCULAR; INTRAVENOUS at 07:44:00

## 2022-04-07 RX ADMIN — SODIUM CHLORIDE, SODIUM LACTATE, POTASSIUM CHLORIDE, CALCIUM CHLORIDE: 600; 310; 30; 20 INJECTION, SOLUTION INTRAVENOUS at 08:46:00

## 2022-04-07 RX ADMIN — EPHEDRINE SULFATE 10 MG: 50 INJECTION, SOLUTION INTRAVENOUS at 07:52:00

## 2022-04-07 RX ADMIN — LIDOCAINE HYDROCHLORIDE 50 MG: 10 INJECTION, SOLUTION EPIDURAL; INFILTRATION; INTRACAUDAL; PERINEURAL at 07:35:00

## 2022-04-07 RX ADMIN — CEFAZOLIN SODIUM/WATER 2 G: 2 G/20 ML SYRINGE (ML) INTRAVENOUS at 07:48:00

## 2022-04-07 RX ADMIN — DEXAMETHASONE SODIUM PHOSPHATE 4 MG: 4 MG/ML VIAL (ML) INJECTION at 07:44:00

## 2022-04-07 NOTE — ANESTHESIA PROCEDURE NOTES
Airway  Date/Time: 4/7/2022 7:36 AM  Urgency: Elective      General Information and Staff    Patient location during procedure: OR  Anesthesiologist: Catia Peñaloza MD  Resident/CRNA: Phoenix Guillen CRNA  Performed: CRNA     Indications and Patient Condition  Indications for airway management: anesthesia  Sedation level: deep  Preoxygenated: yes  Patient position: sniffing  Mask difficulty assessment: 2 - vent by mask + OA or adjuvant +/- NMBA    Final Airway Details  Final airway type: endotracheal airway      Successful airway: ETT  Cuffed: yes   Successful intubation technique: direct laryngoscopy  Facilitating devices/methods: cricoid pressure  Endotracheal tube insertion site: oral  Blade: Dionicio  Blade size: #4  ETT size (mm): 7.5    Cormack-Lehane Classification: grade I - full view of glottis  Placement verified by: chest auscultation and capnometry   Measured from: teeth  ETT to teeth (cm): 23  Number of attempts at approach: 1

## 2022-04-07 NOTE — ANESTHESIA POSTPROCEDURE EVALUATION
Patient: Roe Whitehead.     Procedure Summary     Date: 04/07/22 Room / Location: [facility] OR [address] [place] [address] OR    Anesthesia Start: 7370 Anesthesia Stop: 7201    Procedure: ROBOTIC Repair Right Inguinal Hernia with mesh (Right Pelvis) Diagnosis:       Right inguinal hernia      (Right inguinal hernia [K40.90])    Surgeons: Damon Cobian MD Anesthesiologist: Champ Peñaloza MD    Anesthesia Type: general ASA Status: 2          Anesthesia Type: general    Vitals Value Taken Time   /73 04/07/22 0846   Temp 97.1 04/07/22 0846   Pulse 74 04/07/22 0846   Resp 16 04/07/22 0846   SpO2 100 04/07/22 0846       [facility] AN Post Evaluation:   Patient Evaluated in PACU  Patient Participation: complete - patient participated  Level of Consciousness: awake and alert  Pain Score: 0  Pain Management: adequate  Airway Patency:patent  Dental exam unchanged from preop  Yes    Cardiovascular Status: acceptable  Respiratory Status: acceptable and nasal cannula  Postoperative Hydration acceptable  Comments: scds on in [address], CRNA  4/7/2022 8:46 AM

## 2022-04-07 NOTE — INTERVAL H&P NOTE
Pre-op Diagnosis: Right inguinal hernia [K40.90]    The above referenced H&P was reviewed by Ann Akbar MD on 4/7/2022, the patient was examined and no significant changes have occurred in the patient's condition since the H&P was performed. I discussed with the patient and/or legal representative the potential benefits, risks and side effects of this procedure; the likelihood of the patient achieving goals; and potential problems that might occur during recuperation. I discussed reasonable alternatives to the procedure, including risks, benefits and side effects related to the alternatives and risks related to not receiving this procedure. We will proceed with procedure as planned.

## 2022-04-08 NOTE — OPERATIVE REPORT
[facility]    PATIENT'S NAME: Micha Morales. ATTENDING PHYSICIAN: Eual Nieves MD   OPERATING PHYSICIAN: Eula Nieves MD   PATIENT ACCOUNT#:   [de-identified]    LOCATION:  [address]  MEDICAL RECORD #:   [id number]       YOB: 1945  ADMISSION DATE:       04/07/2022      OPERATION DATE:  04/07/2022    OPERATIVE REPORT    PREOPERATIVE DIAGNOSIS:  Right inguinal hernia. POSTOPERATIVE DIAGNOSIS:  Right pantaloon inguinal hernia. PROCEDURE:  Robotic repair of right pantaloon inguinal hernia with ProGrip mesh. ASSISTANT:  CESILIA Catherine    ESTIMATED BLOOD LOSS:  Minimal.    COMPLICATIONS:  None. ANESTHESIA:  General.      DISPOSITION:  To recovery, tolerated well. INDICATIONS:  Patient is 78, had a left inguinal hernia repaired a year or so ago, presents now with a hernia on the right. Detailed informed consent obtained. OPERATIVE TECHNIQUE:  Patient was taken to surgery, is prepped and draped in usual sterile fashion. A Veress needle placed at Davis's point, abdomen insufflated. An 8 mm trocar was placed using Optiview. Two additional 8 mm trocars were placed. The robot is docked. Left side appears well healed. There is a pantaloon hernia on the right side. Peritoneal flaps are created and elevated widely. The incarcerated preperitoneal fat is removed from the defect. Richy's ligament, transversalis fascia is all dissected widely. A 10 x 15 ProGrip is placed without difficulty. The peritoneum closed using running V-Loc. Hemostasis assured. Trocars removed. Skin closed with 3-0 Monocryl. Marcaine infiltrated for local block.      Dictated By Eula Nieves MD  d: 04/07/2022 08:29:52  t: 04/07/2022 10:12:53  Job [id number]  LL/    cc: Kiki Solorio MD

## 2022-04-18 ENCOUNTER — OFFICE VISIT (OUTPATIENT)
Dept: SURGERY | Facility: CLINIC | Age: 77
End: 2022-04-18
Payer: COMMERCIAL

## 2022-04-18 VITALS — HEIGHT: 69 IN | WEIGHT: 129 LBS | BODY MASS INDEX: 19.11 KG/M2

## 2022-04-18 DIAGNOSIS — Z98.890 POST-OPERATIVE STATE: Primary | ICD-10-CM

## 2022-04-18 PROCEDURE — 99024 POSTOP FOLLOW-UP VISIT: CPT | Performed by: SURGERY

## 2022-04-18 PROCEDURE — 3008F BODY MASS INDEX DOCD: CPT | Performed by: SURGERY

## 2022-04-18 NOTE — PROGRESS NOTES
Postoperative Patient Follow-up      4/18/2022    HPI  Patient presents with:  Post-Op: Patient here for post up Right Inguinal Hernia repair. Patient denies fever. Marjorie Ivory. is a 78year old male postop after robotic repair of right inguinal hernia with mesh. Doing very well. Exam  Abdomen is soft, incisions are clean dry intact      Assessment/Plan  Assessment   Post-operative state  (primary encounter diagnosis)    Follow-up 4 weeks.   Doing terrific         Milan Nugent MD

## 2022-04-25 ENCOUNTER — TELEPHONE (OUTPATIENT)
Dept: SURGERY | Facility: CLINIC | Age: 77
End: 2022-04-25

## 2022-04-25 NOTE — TELEPHONE ENCOUNTER
Per pt did not receive his after care summary from 4/18 and is asking to please mail it to his home.  Please advise

## 2022-05-16 ENCOUNTER — OFFICE VISIT (OUTPATIENT)
Dept: SURGERY | Facility: CLINIC | Age: 77
End: 2022-05-16
Payer: COMMERCIAL

## 2022-05-16 VITALS — WEIGHT: 126 LBS | HEIGHT: 69 IN | BODY MASS INDEX: 18.66 KG/M2

## 2022-05-16 DIAGNOSIS — Z98.890 POST-OPERATIVE STATE: Primary | ICD-10-CM

## 2022-05-16 PROCEDURE — 3008F BODY MASS INDEX DOCD: CPT | Performed by: SURGERY

## 2022-05-16 PROCEDURE — 99024 POSTOP FOLLOW-UP VISIT: CPT | Performed by: SURGERY

## 2022-05-16 NOTE — PROGRESS NOTES
Postoperative Patient Follow-up      5/16/2022    HPI  Patient presents with:  Post-Op: Patient here for second post op Inguinal Hernia Repair. Patient reports eating and drinking are normal.  Patient having normal BM's and Urination. Patient reports intermittent swelling in the Groin area. Daphne Latham. is a 78year old male postop after robotic inguinal hernia repair. Healing well. Exam  Normal postop changes      Assessment/Plan  Assessment   Post-operative state  (primary encounter diagnosis)    Reassurance given.   Follow-up for problems         Yuridia Serna MD

## 2022-05-23 ENCOUNTER — TELEPHONE (OUTPATIENT)
Dept: NEUROLOGY | Facility: CLINIC | Age: 77
End: 2022-05-23

## 2022-05-23 DIAGNOSIS — M17.12 PRIMARY OSTEOARTHRITIS OF LEFT KNEE: Primary | ICD-10-CM

## 2022-05-23 NOTE — TELEPHONE ENCOUNTER
Pt calling to request  a  left  knee INJ  per Dr.Behar last discussion   , also pt stated last inj has been working wonderfully and thinks he needs another one just in case . Pt made a OV for 06/29 per last visit notes.     Please advise

## 2022-05-24 ENCOUNTER — TELEPHONE (OUTPATIENT)
Dept: PHYSICAL MEDICINE AND REHAB | Facility: CLINIC | Age: 77
End: 2022-05-24

## 2022-05-24 NOTE — TELEPHONE ENCOUNTER
Spoke with Dr. Wendy Osei and he stated ok to place order for Left knee Durolane and CSI under ultrasound guidance but he will see and decide at office appt on 06/29/22. Spoke with patient and he agrees with Dr. Kenneth French. States when he gets HA and CSI he gets the maximum relief.

## 2022-05-24 NOTE — TELEPHONE ENCOUNTER
Initiated authorization for LEFT knee DUROLANE and CSI under ultrasound guidance CPT 42858+ with Kiki Flores at [facility]  Case #[id number].   Status: Approved-authorization is not required-BUY[employer]BILL

## 2022-05-24 NOTE — TELEPHONE ENCOUNTER
Pt is returning our call, please call him back. He wants to have both injections done on his upcoming visit of June 29th. Can this be done? Call him and leave him a detailed message on his vm if he doesn't answer.   Thanks

## 2022-06-21 ENCOUNTER — TELEPHONE (OUTPATIENT)
Dept: PHYSICAL MEDICINE AND REHAB | Facility: CLINIC | Age: 77
End: 2022-06-21

## 2022-06-29 ENCOUNTER — HOSPITAL ENCOUNTER (OUTPATIENT)
Dept: GENERAL RADIOLOGY | Facility: HOSPITAL | Age: 77
Discharge: HOME | End: 2022-06-29
Attending: PHYSICAL MEDICINE & REHABILITATION
Payer: MEDICARE

## 2022-06-29 ENCOUNTER — OFFICE VISIT (OUTPATIENT)
Dept: PHYSICAL MEDICINE AND REHAB | Facility: CLINIC | Age: 77
End: 2022-06-29
Payer: COMMERCIAL

## 2022-06-29 VITALS — WEIGHT: 125 LBS | BODY MASS INDEX: 18.51 KG/M2 | HEIGHT: 69 IN

## 2022-06-29 DIAGNOSIS — G89.29 CHRONIC PAIN OF LEFT KNEE: ICD-10-CM

## 2022-06-29 DIAGNOSIS — M25.562 CHRONIC PAIN OF LEFT KNEE: ICD-10-CM

## 2022-06-29 DIAGNOSIS — M72.2 PLANTAR FASCIITIS, RIGHT: ICD-10-CM

## 2022-06-29 DIAGNOSIS — M79.672 LEFT FOOT PAIN: ICD-10-CM

## 2022-06-29 DIAGNOSIS — M72.2 PLANTAR FASCIITIS OF LEFT FOOT: ICD-10-CM

## 2022-06-29 DIAGNOSIS — M79.671 RIGHT FOOT PAIN: ICD-10-CM

## 2022-06-29 DIAGNOSIS — M17.12 PRIMARY OSTEOARTHRITIS OF LEFT KNEE: ICD-10-CM

## 2022-06-29 DIAGNOSIS — M22.2X9 PATELLOFEMORAL PAIN SYNDROME, UNSPECIFIED LATERALITY: ICD-10-CM

## 2022-06-29 DIAGNOSIS — M79.671 RIGHT FOOT PAIN: Primary | ICD-10-CM

## 2022-06-29 PROCEDURE — 99214 OFFICE O/P EST MOD 30 MIN: CPT | Performed by: PHYSICAL MEDICINE & REHABILITATION

## 2022-06-29 PROCEDURE — 73630 X-RAY EXAM OF FOOT: CPT | Performed by: PHYSICAL MEDICINE & REHABILITATION

## 2022-06-29 PROCEDURE — 3008F BODY MASS INDEX DOCD: CPT | Performed by: PHYSICAL MEDICINE & REHABILITATION

## 2022-07-01 ENCOUNTER — TELEPHONE (OUTPATIENT)
Dept: PHYSICAL MEDICINE AND REHAB | Facility: CLINIC | Age: 77
End: 2022-07-01

## 2022-07-01 ENCOUNTER — TELEPHONE (OUTPATIENT)
Dept: PHYSICAL THERAPY | Facility: HOSPITAL | Age: 77
End: 2022-07-01

## 2022-07-01 DIAGNOSIS — M22.2X9 PATELLOFEMORAL PAIN SYNDROME, UNSPECIFIED LATERALITY: ICD-10-CM

## 2022-07-01 DIAGNOSIS — M79.671 RIGHT FOOT PAIN: Primary | ICD-10-CM

## 2022-07-01 DIAGNOSIS — G89.29 CHRONIC PAIN OF LEFT KNEE: ICD-10-CM

## 2022-07-01 DIAGNOSIS — M79.672 LEFT FOOT PAIN: ICD-10-CM

## 2022-07-01 DIAGNOSIS — M72.2 PLANTAR FASCIITIS OF LEFT FOOT: ICD-10-CM

## 2022-07-01 DIAGNOSIS — M17.12 PRIMARY OSTEOARTHRITIS OF LEFT KNEE: ICD-10-CM

## 2022-07-01 DIAGNOSIS — M72.2 PLANTAR FASCIITIS, RIGHT: ICD-10-CM

## 2022-07-01 DIAGNOSIS — M25.562 CHRONIC PAIN OF LEFT KNEE: ICD-10-CM

## 2022-07-01 PROCEDURE — 99443 PHONE E/M BY PHYS 21-30 MIN: CPT | Performed by: PHYSICAL MEDICINE & REHABILITATION

## 2022-07-05 ENCOUNTER — TELEPHONE (OUTPATIENT)
Dept: NEUROLOGY | Facility: CLINIC | Age: 77
End: 2022-07-05

## 2022-07-05 DIAGNOSIS — M72.2 PLANTAR FASCIITIS, RIGHT: ICD-10-CM

## 2022-07-05 DIAGNOSIS — M79.671 RIGHT FOOT PAIN: Primary | ICD-10-CM

## 2022-07-05 NOTE — TELEPHONE ENCOUNTER
Pt call to inform there is no sooner apt for PT until August 24  . Pt will consider PRP injections  .   Please advise if he can get the inj first and later the PT .

## 2022-07-07 ENCOUNTER — TELEPHONE (OUTPATIENT)
Dept: PHYSICAL MEDICINE AND REHAB | Facility: CLINIC | Age: 77
End: 2022-07-07

## 2022-07-07 NOTE — TELEPHONE ENCOUNTER
Initiated authorization for Platelet Rich Plasma injection to right plantar fascia under ultrasound guidance CPT 0232T with Sharlene Boone at [facility].   Status: Denied-not a covered benefit/considered experimental/investigational/unproven

## 2022-07-07 NOTE — TELEPHONE ENCOUNTER
S/w pt, would like to proceed with PRP injection. Reviewed the following with pt:   -experimental, not covered by insurance  -needs to pay $705 up front at time of service  -one injection site per appt (pt wants injection to bilateral feet)  -will be prescribed pain medications (Norco)  -needs crutches 1 week after and maintain non-weight bearing status, and CAM boot for the following week  -pt to start physical therapy 1-2 weeks after injection (pt currently scheduled for PT eval on 8/24/22 but PT sessions wouldn't start until Sept.)  -Dr. Trenna Barthel prefers pt work with [facility] Burgos PT  -Risks/ side effects reviewed per Dr.Behar's advice  -Pt to avoid NSAIDS 2 weeks before and 2 weeks after injection. Pt takes Mesalamine to prevent flare up of Ulcerative Colitis - per Dr. Trenna Barthel, pt needs to be off from this medication 2 weeks before and 2 weeks after. Up to patient to determine if he would like to proceed as such. Order placed.

## 2022-07-11 ENCOUNTER — TELEPHONE (OUTPATIENT)
Dept: PHYSICAL MEDICINE AND REHAB | Facility: CLINIC | Age: 77
End: 2022-07-11

## 2022-07-11 ENCOUNTER — TELEPHONE (OUTPATIENT)
Dept: PHYSICAL THERAPY | Facility: HOSPITAL | Age: 77
End: 2022-07-11

## 2022-07-11 NOTE — TELEPHONE ENCOUNTER
Patient wants to hold off on PRP due to required restrictions (crutches for 1 week) and his home setup (Tri-level split level home). Notified pt that we did reach out to [facility], physical therapist, who stated he would be reaching out to him to try to accommodate a sooner appt. Pt wants to know Dr. Pablito Grossman recommendations moving forward.

## 2022-07-12 NOTE — TELEPHONE ENCOUNTER
S/w pt shared Dr. Townsend Late recommendations of Prolotherapy. No activity restrictions required for this. $224 due at time of service. Patient still waiting for the following:  - Schedule sooner appt with Elmer  - Pt will need to schedule injection with PT 1 week after?   - When can patient repeat Prolotherapy injection to the other foot?   - Pt has office appt with Dr. Maycol Sanchez on 7/20/22 and will bring up holding Mesalamine for 2 weeks before and 2 weeks after injection

## 2022-07-15 NOTE — TELEPHONE ENCOUNTER
S/w pt. Notified him Dr. Lisset Cooper can do prolotherapy injection to bilateral feet at the same office visit but will still need to pay $224 per injection. Pt verbalized understanding. Patient will be meeting with Dr. Dennys Lopez on 7/20/22 and will consult regarding holding Mesalamine for total of 4 weeks. Patient will also schedule physical therapy sessions since he has yet to do so. Patient will call office back next week with updates on the above.

## 2022-08-22 ENCOUNTER — TELEPHONE (OUTPATIENT)
Dept: PHYSICAL THERAPY | Facility: HOSPITAL | Age: 77
End: 2022-08-22

## 2022-08-24 ENCOUNTER — APPOINTMENT (OUTPATIENT)
Dept: PHYSICAL THERAPY | Facility: HOSPITAL | Age: 77
End: 2022-08-24
Attending: PHYSICAL MEDICINE & REHABILITATION
Payer: MEDICARE

## 2022-08-26 ENCOUNTER — APPOINTMENT (OUTPATIENT)
Dept: PHYSICAL THERAPY | Facility: HOSPITAL | Age: 77
End: 2022-08-26
Attending: PHYSICAL MEDICINE & REHABILITATION
Payer: MEDICARE

## 2022-09-02 ENCOUNTER — TELEPHONE (OUTPATIENT)
Dept: PHYSICAL THERAPY | Facility: HOSPITAL | Age: 77
End: 2022-09-02

## 2022-09-02 ENCOUNTER — TELEPHONE (OUTPATIENT)
Dept: PHYSICAL MEDICINE AND REHAB | Facility: CLINIC | Age: 77
End: 2022-09-02

## 2022-09-02 NOTE — TELEPHONE ENCOUNTER
Patient is calling in stating he would like to get his injection scheduled.       Patient states he would like to speak to Dr.Behar

## 2022-09-09 ENCOUNTER — APPOINTMENT (OUTPATIENT)
Dept: PHYSICAL THERAPY | Facility: HOSPITAL | Age: 77
End: 2022-09-09
Attending: PHYSICAL MEDICINE & REHABILITATION
Payer: MEDICARE

## 2022-09-12 ENCOUNTER — APPOINTMENT (OUTPATIENT)
Dept: PHYSICAL THERAPY | Facility: HOSPITAL | Age: 77
End: 2022-09-12
Attending: PHYSICAL MEDICINE & REHABILITATION
Payer: MEDICARE

## 2022-09-14 ENCOUNTER — APPOINTMENT (OUTPATIENT)
Dept: PHYSICAL THERAPY | Facility: HOSPITAL | Age: 77
End: 2022-09-14
Attending: PHYSICAL MEDICINE & REHABILITATION
Payer: MEDICARE

## 2022-09-19 ENCOUNTER — APPOINTMENT (OUTPATIENT)
Dept: PHYSICAL THERAPY | Facility: HOSPITAL | Age: 77
End: 2022-09-19
Attending: PHYSICAL MEDICINE & REHABILITATION
Payer: MEDICARE

## 2022-09-21 ENCOUNTER — APPOINTMENT (OUTPATIENT)
Dept: PHYSICAL THERAPY | Facility: HOSPITAL | Age: 77
End: 2022-09-21
Attending: PHYSICAL MEDICINE & REHABILITATION
Payer: MEDICARE

## 2022-09-26 ENCOUNTER — APPOINTMENT (OUTPATIENT)
Dept: PHYSICAL THERAPY | Facility: HOSPITAL | Age: 77
End: 2022-09-26
Attending: PHYSICAL MEDICINE & REHABILITATION
Payer: MEDICARE

## 2022-09-28 ENCOUNTER — APPOINTMENT (OUTPATIENT)
Dept: PHYSICAL THERAPY | Facility: HOSPITAL | Age: 77
End: 2022-09-28
Attending: PHYSICAL MEDICINE & REHABILITATION
Payer: MEDICARE

## 2022-10-12 ENCOUNTER — TELEPHONE (OUTPATIENT)
Dept: INTERNAL MEDICINE CLINIC | Facility: CLINIC | Age: 77
End: 2022-10-12

## 2022-11-09 ENCOUNTER — TELEPHONE (OUTPATIENT)
Dept: INTERNAL MEDICINE CLINIC | Facility: CLINIC | Age: 77
End: 2022-11-09

## 2023-02-17 ENCOUNTER — OFFICE VISIT (OUTPATIENT)
Dept: INTERNAL MEDICINE CLINIC | Facility: CLINIC | Age: 78
End: 2023-02-17

## 2023-02-17 DIAGNOSIS — I83.92 ASYMPTOMATIC VARICOSE VEINS OF LEFT LOWER EXTREMITY: ICD-10-CM

## 2023-02-17 DIAGNOSIS — Z00.00 MEDICARE ANNUAL WELLNESS VISIT, SUBSEQUENT: Primary | ICD-10-CM

## 2023-02-17 DIAGNOSIS — E46 PROTEIN-CALORIE MALNUTRITION, UNSPECIFIED SEVERITY (HCC): ICD-10-CM

## 2023-02-17 DIAGNOSIS — Z98.890 HISTORY OF HERNIA REPAIR: ICD-10-CM

## 2023-02-17 DIAGNOSIS — H91.93 HEARING DIFFICULTY OF BOTH EARS: ICD-10-CM

## 2023-02-17 DIAGNOSIS — K51.90 ULCERATIVE COLITIS WITHOUT COMPLICATIONS, UNSPECIFIED LOCATION (HCC): ICD-10-CM

## 2023-02-17 DIAGNOSIS — Z87.19 HISTORY OF HERNIA REPAIR: ICD-10-CM

## 2023-02-17 DIAGNOSIS — M79.671 RIGHT FOOT PAIN: ICD-10-CM

## 2023-02-17 DIAGNOSIS — K43.9 SPIGELIAN HERNIA: ICD-10-CM

## 2023-02-17 DIAGNOSIS — H40.10X1 OPEN-ANGLE GLAUCOMA OF BOTH EYES, MILD STAGE, UNSPECIFIED OPEN-ANGLE GLAUCOMA TYPE: ICD-10-CM

## 2023-02-17 DIAGNOSIS — M81.0 OSTEOPOROSIS, UNSPECIFIED OSTEOPOROSIS TYPE, UNSPECIFIED PATHOLOGICAL FRACTURE PRESENCE: ICD-10-CM

## 2023-02-17 DIAGNOSIS — Z00.00 ENCOUNTER FOR ANNUAL HEALTH EXAMINATION: ICD-10-CM

## 2023-02-17 DIAGNOSIS — E78.5 HYPERLIPIDEMIA, UNSPECIFIED HYPERLIPIDEMIA TYPE: ICD-10-CM

## 2023-02-17 DIAGNOSIS — M17.12 PRIMARY OSTEOARTHRITIS OF LEFT KNEE: ICD-10-CM

## 2023-02-17 DIAGNOSIS — I70.0 AORTIC ATHEROSCLEROSIS (HCC): ICD-10-CM

## 2023-02-17 PROCEDURE — 80053 COMPREHEN METABOLIC PANEL: CPT | Performed by: INTERNAL MEDICINE

## 2023-02-17 PROCEDURE — 81001 URINALYSIS AUTO W/SCOPE: CPT | Performed by: INTERNAL MEDICINE

## 2023-02-17 PROCEDURE — 80061 LIPID PANEL: CPT | Performed by: INTERNAL MEDICINE

## 2023-02-17 PROCEDURE — 85025 COMPLETE CBC W/AUTO DIFF WBC: CPT | Performed by: INTERNAL MEDICINE

## 2023-02-17 PROCEDURE — 84443 ASSAY THYROID STIM HORMONE: CPT | Performed by: INTERNAL MEDICINE

## 2023-02-17 RX ORDER — MESALAMINE 1.2 G/1
4.8 TABLET, DELAYED RELEASE ORAL DAILY
COMMUNITY
Start: 2023-01-24

## 2023-02-18 VITALS
WEIGHT: 128 LBS | DIASTOLIC BLOOD PRESSURE: 80 MMHG | HEIGHT: 69 IN | BODY MASS INDEX: 18.96 KG/M2 | SYSTOLIC BLOOD PRESSURE: 128 MMHG

## 2023-02-18 PROBLEM — M22.2X9 PATELLOFEMORAL PAIN SYNDROME, UNSPECIFIED LATERALITY: Status: RESOLVED | Noted: 2021-04-07 | Resolved: 2023-02-18

## 2023-02-18 PROBLEM — M25.462 EFFUSION OF BURSA OF LEFT KNEE: Status: RESOLVED | Noted: 2021-04-07 | Resolved: 2023-02-18

## 2023-02-18 PROBLEM — E46 PROTEIN-CALORIE MALNUTRITION, UNSPECIFIED SEVERITY (HCC): Status: RESOLVED | Noted: 2023-02-18 | Resolved: 2023-02-18

## 2023-02-18 PROBLEM — E46 PROTEIN-CALORIE MALNUTRITION, UNSPECIFIED SEVERITY (HCC): Status: ACTIVE | Noted: 2023-02-18

## 2023-02-18 LAB
ALBUMIN SERPL-MCNC: 4.2 G/DL (ref 3.4–5)
ALBUMIN/GLOB SERPL: 1.3 {RATIO} (ref 1–2)
ALP LIVER SERPL-CCNC: 68 U/L
ALT SERPL-CCNC: 25 U/L
ANION GAP SERPL CALC-SCNC: 7 MMOL/L (ref 0–18)
AST SERPL-CCNC: 17 U/L (ref 15–37)
BASOPHILS # BLD AUTO: 0.09 X10(3) UL (ref 0–0.2)
BASOPHILS NFR BLD AUTO: 1.4 %
BILIRUB SERPL-MCNC: 0.7 MG/DL (ref 0.1–2)
BILIRUB UR QL: NEGATIVE
BUN BLD-MCNC: 19 MG/DL (ref 7–18)
BUN/CREAT SERPL: 16.1 (ref 10–20)
CALCIUM BLD-MCNC: 9.3 MG/DL (ref 8.5–10.1)
CHLORIDE SERPL-SCNC: 106 MMOL/L (ref 98–112)
CHOLEST SERPL-MCNC: 166 MG/DL (ref ?–200)
CLARITY UR: CLEAR
CO2 SERPL-SCNC: 29 MMOL/L (ref 21–32)
COLOR UR: YELLOW
COMPLEXED PSA SERPL-MCNC: 2.26 NG/ML (ref ?–4)
CREAT BLD-MCNC: 1.18 MG/DL
DEPRECATED RDW RBC AUTO: 46 FL (ref 35.1–46.3)
EOSINOPHIL # BLD AUTO: 0.24 X10(3) UL (ref 0–0.7)
EOSINOPHIL NFR BLD AUTO: 3.7 %
ERYTHROCYTE [DISTWIDTH] IN BLOOD BY AUTOMATED COUNT: 13.1 % (ref 11–15)
FASTING PATIENT LIPID ANSWER: YES
FASTING STATUS PATIENT QL REPORTED: YES
GFR SERPLBLD BASED ON 1.73 SQ M-ARVRAT: 64 ML/MIN/1.73M2 (ref 60–?)
GLOBULIN PLAS-MCNC: 3.3 G/DL (ref 2.8–4.4)
GLUCOSE BLD-MCNC: 98 MG/DL (ref 70–99)
GLUCOSE UR-MCNC: NEGATIVE MG/DL
HCT VFR BLD AUTO: 49.7 %
HDLC SERPL-MCNC: 66 MG/DL (ref 40–59)
HGB BLD-MCNC: 15.9 G/DL
HGB UR QL STRIP.AUTO: NEGATIVE
IMM GRANULOCYTES # BLD AUTO: 0.24 X10(3) UL (ref 0–1)
IMM GRANULOCYTES NFR BLD: 3.7 %
KETONES UR-MCNC: NEGATIVE MG/DL
LDLC SERPL CALC-MCNC: 87 MG/DL (ref ?–100)
LYMPHOCYTES # BLD AUTO: 2.97 X10(3) UL (ref 1–4)
LYMPHOCYTES NFR BLD AUTO: 45.3 %
MCH RBC QN AUTO: 30.4 PG (ref 26–34)
MCHC RBC AUTO-ENTMCNC: 32 G/DL (ref 31–37)
MCV RBC AUTO: 95 FL
MONOCYTES # BLD AUTO: 0.58 X10(3) UL (ref 0.1–1)
MONOCYTES NFR BLD AUTO: 8.8 %
NEUTROPHILS # BLD AUTO: 2.44 X10 (3) UL (ref 1.5–7.7)
NEUTROPHILS # BLD AUTO: 2.44 X10(3) UL (ref 1.5–7.7)
NEUTROPHILS NFR BLD AUTO: 37.1 %
NITRITE UR QL STRIP.AUTO: NEGATIVE
NONHDLC SERPL-MCNC: 100 MG/DL (ref ?–130)
OSMOLALITY SERPL CALC.SUM OF ELEC: 296 MOSM/KG (ref 275–295)
PH UR: 5 [PH] (ref 5–8)
PLATELET # BLD AUTO: 226 10(3)UL (ref 150–450)
POTASSIUM SERPL-SCNC: 4.6 MMOL/L (ref 3.5–5.1)
PROT SERPL-MCNC: 7.5 G/DL (ref 6.4–8.2)
PROT UR-MCNC: NEGATIVE MG/DL
RBC # BLD AUTO: 5.23 X10(6)UL
SODIUM SERPL-SCNC: 142 MMOL/L (ref 136–145)
SP GR UR STRIP: 1.02 (ref 1–1.03)
TRIGL SERPL-MCNC: 69 MG/DL (ref 30–149)
TSI SER-ACNC: 1.79 MIU/ML (ref 0.36–3.74)
UROBILINOGEN UR STRIP-ACNC: <2
VIT C UR-MCNC: NEGATIVE MG/DL
VLDLC SERPL CALC-MCNC: 11 MG/DL (ref 0–30)
WBC # BLD AUTO: 6.6 X10(3) UL (ref 4–11)

## 2023-02-23 ENCOUNTER — LAB ENCOUNTER (OUTPATIENT)
Dept: LAB | Facility: HOSPITAL | Age: 78
End: 2023-02-23
Attending: INTERNAL MEDICINE
Payer: MEDICARE

## 2023-02-23 DIAGNOSIS — R82.90 ABNORMAL URINE: ICD-10-CM

## 2023-02-23 DIAGNOSIS — Z00.00 MEDICARE ANNUAL WELLNESS VISIT, SUBSEQUENT: ICD-10-CM

## 2023-02-23 LAB
ATRIAL RATE: 71 BPM
BILIRUB UR QL: NEGATIVE
CLARITY UR: CLEAR
COLOR UR: YELLOW
GLUCOSE UR-MCNC: NEGATIVE MG/DL
HGB UR QL STRIP.AUTO: NEGATIVE
HYALINE CASTS #/AREA URNS AUTO: PRESENT /LPF
KETONES UR-MCNC: NEGATIVE MG/DL
NITRITE UR QL STRIP.AUTO: NEGATIVE
P AXIS: 65 DEGREES
P-R INTERVAL: 130 MS
PH UR: 5 [PH] (ref 5–8)
PROT UR-MCNC: NEGATIVE MG/DL
Q-T INTERVAL: 388 MS
QRS DURATION: 88 MS
QTC CALCULATION (BEZET): 421 MS
R AXIS: 58 DEGREES
SP GR UR STRIP: 1.02 (ref 1–1.03)
T AXIS: 49 DEGREES
UROBILINOGEN UR STRIP-ACNC: <2
VENTRICULAR RATE: 71 BPM
VIT C UR-MCNC: 40 MG/DL

## 2023-02-23 PROCEDURE — 93005 ELECTROCARDIOGRAM TRACING: CPT

## 2023-02-23 PROCEDURE — 81001 URINALYSIS AUTO W/SCOPE: CPT

## 2023-02-23 PROCEDURE — 93010 ELECTROCARDIOGRAM REPORT: CPT | Performed by: INTERNAL MEDICINE

## 2023-02-24 ENCOUNTER — LAB ENCOUNTER (OUTPATIENT)
Dept: LAB | Facility: HOSPITAL | Age: 78
End: 2023-02-24
Attending: INTERNAL MEDICINE
Payer: MEDICARE

## 2023-02-24 DIAGNOSIS — N30.00 ACUTE CYSTITIS WITHOUT HEMATURIA: ICD-10-CM

## 2023-02-24 PROCEDURE — 87086 URINE CULTURE/COLONY COUNT: CPT

## 2023-02-27 ENCOUNTER — TELEPHONE (OUTPATIENT)
Dept: INTERNAL MEDICINE CLINIC | Facility: CLINIC | Age: 78
End: 2023-02-27

## 2023-02-27 NOTE — TELEPHONE ENCOUNTER
Pt called for repeat urine test-Cul-relay result message , s/s are better , much less urgency, asking if need more than 3 days of abx since s/s are less but still there

## 2023-03-03 ENCOUNTER — TELEPHONE (OUTPATIENT)
Dept: INTERNAL MEDICINE CLINIC | Facility: CLINIC | Age: 78
End: 2023-03-03

## 2023-03-03 NOTE — TELEPHONE ENCOUNTER
Patient has received the lab results mailed to his home on 2/20. Patient would like to express his gratitude for sending the results in a timely manner.

## 2023-03-07 ENCOUNTER — HOSPITAL ENCOUNTER (OUTPATIENT)
Dept: BONE DENSITY | Facility: HOSPITAL | Age: 78
Discharge: HOME | End: 2023-03-07
Attending: INTERNAL MEDICINE
Payer: MEDICARE

## 2023-03-07 ENCOUNTER — APPOINTMENT (OUTPATIENT)
Dept: LAB | Facility: HOSPITAL | Age: 78
End: 2023-03-07
Attending: INTERNAL MEDICINE
Payer: MEDICARE

## 2023-03-07 DIAGNOSIS — M81.0 OSTEOPOROSIS, UNSPECIFIED OSTEOPOROSIS TYPE, UNSPECIFIED PATHOLOGICAL FRACTURE PRESENCE: ICD-10-CM

## 2023-03-07 PROCEDURE — 77080 DXA BONE DENSITY AXIAL: CPT | Performed by: INTERNAL MEDICINE

## 2023-03-09 ENCOUNTER — TELEPHONE (OUTPATIENT)
Dept: INTERNAL MEDICINE CLINIC | Facility: CLINIC | Age: 78
End: 2023-03-09

## 2023-03-09 NOTE — TELEPHONE ENCOUNTER
Patient was calling on DEXA scan on results. Patient advised that test results now post immediately to your [employer] account. However, your doctor may not have the chance to review or provide comments on them before the results reach you. Our providers review and comment on all test results, normal or otherwise. Read patient conclusion from radiologist.  Patient indicated that never took the fosamax prescribed by Dr Janay Morrison due to increased risk of cancer. Went over different medications used to treat osteoporosis, but should see an endocrinologist as advised by Dr Janay Morrison on 2/17/2023. If you have not heard from our office with comments on your test results within the next 3-4 days, please contact us again on this message string so we can assist you. Patient agreed. Transferred patient to endocrinology to schedule an appointment.

## 2023-03-09 NOTE — TELEPHONE ENCOUNTER
Patient called and stated he was awaiting his test results, he was unable to see [place] message --- I made him aware the message was related to an appointment he has scheduled. He was made aware PCP was unable to look at results as of yet. I made him aware if result interpretation(s) are not conveyed within 3-4 days, he should give us a call. We did review verbatim his values from 2019 and on 3/7/23. Patient verbalized understanding. No further questions or concerns at this time.

## 2023-03-10 NOTE — TELEPHONE ENCOUNTER
Can we try to have him see endo sooner, He can darci who ever can see him sooner, and ca 800 mg vit D 1000 I u

## 2023-03-10 NOTE — TELEPHONE ENCOUNTER
Endo, please advise if there is an opening or if provider is able to see patient any sooner than 6/6 (already has appprabha talley/ Heidy Franklin for this date)

## 2023-03-10 NOTE — TELEPHONE ENCOUNTER
Dr. Kramer [place] only, patient really wants you to stay informed. 1. He is more open-minded to Osteoprosis treatments   2. Has June Appt with Endocrinologist, Will ask for APRN or PA for Endo, to get a sooner appointment in April or May. 3. Will continue Calcium and Vitamin D3       Patient returned our call. Patient's date of birth and full name both confirmed. Patient has many questions and concerns. Presenting with Rapid speech. Wanted to review recommendations of Bone Density Test results and recommendations per Dr. Ofelia Bee Calcium and Vitamin D3 - addressed pt's questions about both. Unlike,  Before when he did refuse to take fosamax 3.5 years ago (He was concerned about it causing cancer) he is more open-minded to take medication or other suggested treatments. He is agreeable to see Endocrinology   But soonest is June 6.   RN advised APRN to PA can get a sooner appt , and may keep June's appt with MD.   He's interested in Pierre and Prolia injections. RN discussed weight-bearing exercises as tolerated, avoid high-impact activity that might cause more pain and fractures    He verbalizes understanding of all information, and agreeable to plan to Call Endo for APRN/PA appointment sooner.  And if any issues, he can call our office to see if Dr. Alfa Aguilar needs him to be seen sooner and if we should assist with sooner appt, or if Dr. Alfa Aguilar has any other treatment recommendations in the interim until June Appt with Endocrinologist.       Future Appointments   Date Time Provider Evens Summers   3/29/2023  7:00 PM [facility] CT RM1 [facility] CT SCAN EM [address]   6/6/2023  2:00 PM Kimberly Rodriguez MD [facility]

## 2023-03-10 NOTE — TELEPHONE ENCOUNTER
Patient calling ( identified name and  ) in regards to long message below    Patient did call Endo again,  Since he is not diabetic  cannot be seen by APRN or PA     Patient asking if Dr. Lennox Fisherman can intervene to be seen sooner by Endocrinology     Asking  if he should increase his calcium or Vitamin D 3 in the meantime    Please advise and thank you.       Future Appointments   Date Time Provider Evens Summers   3/29/2023  7:00 PM [facility] CT RM1 [facility] CT SCAN [facility]   2023  2:00 PM Zee Smith MD [facility]         Requesting to have bone density results mailed to his home , address verified and sent via mail   Best call back number: Jerad Hodgkin  at [phone]

## 2023-03-29 ENCOUNTER — HOSPITAL ENCOUNTER (OUTPATIENT)
Dept: CT IMAGING | Facility: HOSPITAL | Age: 78
Discharge: HOME | End: 2023-03-29
Attending: INTERNAL MEDICINE

## 2023-03-29 DIAGNOSIS — I70.0 AORTIC ATHEROSCLEROSIS (HCC): ICD-10-CM

## 2023-03-30 ENCOUNTER — ORDER TRANSCRIPTION (OUTPATIENT)
Dept: ADMINISTRATIVE | Facility: HOSPITAL | Age: 78
End: 2023-03-30

## 2023-03-30 DIAGNOSIS — Z13.9 ENCOUNTER FOR SCREENING: Primary | ICD-10-CM

## 2023-03-30 NOTE — PROGRESS NOTES
Date of Service 3/29/2023    Shashirona Infante  Date of Birth 6/26/1945    Patient Age: 78year old    PCP: Elner Mohs, MD  [address] [facility] [address]    Consult Type  Type Scan/Screening: Heart Scan  Preliminary Heart Scan Score: 10.5    Lipid Profile  Cholesterol: 166, done on 2/17/2023. HDL Cholesterol: 66, done on 2/17/2023. LDL Cholesterol: 87, done on 2/17/2023. TriGlycerides 69, done on 2/17/2023. Nurse Review  Risk factor information and results reviewed with Nurse: Yes    Recommended Follow Up:  Consult your physician regarding[de-identified]   Final Heart Scan Report; Discuss potential for Incidental Finding    Free PV Screening offered to patient. (Patient to be scheduled for a free PV Stroke Screening Carotid and Abdominal Aorta Ultrasound.)      Recommendations for Change:  Nutrition Changes: Low Saturated Fat;Low Fat Dairy; Increase Fiber     Cholesterol Modification (goal of therapy depends upon your risk):   Decrease LDL (Lousy/Bad) Ideal <100    Exercise: Enhance Current Program           Repeat Heart Scan:   3 Years if Calcium Score is > 0.0;  5 years if Calcium Score is 0.0; Discuss with your Physician          Jake Recommended Resources:  Recommended Resources: Upcoming Classes, Medical Services and Health Library www. [email or web address]. [place] RN        Please Contact the Nurse Heart Line with any Questions or Concerns [phone]. Clear

## 2023-04-17 ENCOUNTER — TELEPHONE (OUTPATIENT)
Dept: ENDOCRINOLOGY CLINIC | Facility: CLINIC | Age: 78
End: 2023-04-17

## 2023-04-17 ENCOUNTER — OFFICE VISIT (OUTPATIENT)
Dept: ENDOCRINOLOGY CLINIC | Facility: CLINIC | Age: 78
End: 2023-04-17

## 2023-04-17 VITALS
HEART RATE: 68 BPM | HEIGHT: 69 IN | WEIGHT: 130.38 LBS | BODY MASS INDEX: 19.31 KG/M2 | DIASTOLIC BLOOD PRESSURE: 67 MMHG | SYSTOLIC BLOOD PRESSURE: 117 MMHG

## 2023-04-17 DIAGNOSIS — E55.9 VITAMIN D DEFICIENCY: ICD-10-CM

## 2023-04-17 DIAGNOSIS — M81.0 AGE-RELATED OSTEOPOROSIS WITHOUT CURRENT PATHOLOGICAL FRACTURE: Primary | ICD-10-CM

## 2023-04-17 DIAGNOSIS — K51.90 ULCERATIVE COLITIS WITHOUT COMPLICATIONS, UNSPECIFIED LOCATION (HCC): ICD-10-CM

## 2023-04-17 PROCEDURE — 3078F DIAST BP <80 MM HG: CPT | Performed by: INTERNAL MEDICINE

## 2023-04-17 PROCEDURE — 99214 OFFICE O/P EST MOD 30 MIN: CPT | Performed by: INTERNAL MEDICINE

## 2023-04-17 PROCEDURE — 3008F BODY MASS INDEX DOCD: CPT | Performed by: INTERNAL MEDICINE

## 2023-04-17 PROCEDURE — 3074F SYST BP LT 130 MM HG: CPT | Performed by: INTERNAL MEDICINE

## 2023-04-17 NOTE — TELEPHONE ENCOUNTER
Patient requesting Rn to Mail out 4/17/2023 Discharge paperwork. [address] [facility], [address] [facility]    For additional questions please call. Thank you.

## 2023-04-19 NOTE — TELEPHONE ENCOUNTER
Dr. Edyta Mckeon,     Please sign LOV 4/17 note so we can send AVS to patient. Called patient, and clarified questions regarding labs- patient will get labs tomorrow or Friday. Patient requesting AVS to be mailed to address.

## 2023-04-21 NOTE — TELEPHONE ENCOUNTER
Pt has additional questions about the order please call and pt also gives permission to leave voicemail thanks

## 2023-04-24 ENCOUNTER — LAB ENCOUNTER (OUTPATIENT)
Dept: LAB | Facility: HOSPITAL | Age: 78
End: 2023-04-24
Attending: INTERNAL MEDICINE
Payer: MEDICARE

## 2023-04-24 NOTE — TELEPHONE ENCOUNTER
Patient arrived at lab to  supplies for 24-hour urine and get labs done  Spoke to lab to advise patient can complete:  CBC, sed rate, CMP, alk phos, vitamin D, PTH, FSH, LH, TESTT + SHBG +TEST and phosphorus  Lab will hold off on drawing monoclonal protein, cortisol and ACTH plasma at this time

## 2023-04-24 NOTE — TELEPHONE ENCOUNTER
Dr. Lauri Elam,  See message below - patient has multiple lab orders in system  Please advise on timing of lab orders - thanks

## 2023-04-25 NOTE — TELEPHONE ENCOUNTER
Hi!  I had explained to patient that he should collect the 24 hours urine and then when he delivers the urine, he can get fasting labs done to make it easier for him and to avoid multiple blood draws. Thank you.

## 2023-04-25 NOTE — TELEPHONE ENCOUNTER
Spoke to patient to advise: complete 24 hour urine collection and then return to lab to turn in urine and have fasting labs drawn - patient stated understanding and will complete labs as directed

## 2023-04-27 ENCOUNTER — LAB ENCOUNTER (OUTPATIENT)
Dept: LAB | Facility: HOSPITAL | Age: 78
End: 2023-04-27
Attending: INTERNAL MEDICINE
Payer: MEDICARE

## 2023-04-27 DIAGNOSIS — E55.9 VITAMIN D DEFICIENCY: ICD-10-CM

## 2023-04-27 DIAGNOSIS — M81.0 AGE-RELATED OSTEOPOROSIS WITHOUT CURRENT PATHOLOGICAL FRACTURE: ICD-10-CM

## 2023-04-27 LAB
ALBUMIN SERPL-MCNC: 3.8 G/DL (ref 3.4–5)
ALBUMIN/GLOB SERPL: 1.1 {RATIO} (ref 1–2)
ALP LIVER SERPL-CCNC: 72 U/L
ALT SERPL-CCNC: 26 U/L
ANION GAP SERPL CALC-SCNC: 6 MMOL/L (ref 0–18)
AST SERPL-CCNC: 19 U/L (ref 15–37)
BASOPHILS # BLD AUTO: 0.07 X10(3) UL (ref 0–0.2)
BASOPHILS NFR BLD AUTO: 1 %
BILIRUB SERPL-MCNC: 0.6 MG/DL (ref 0.1–2)
BUN BLD-MCNC: 15 MG/DL (ref 7–18)
BUN/CREAT SERPL: 14 (ref 10–20)
CALCIUM 24H UR-SRATE: 220 MG/24HR (ref 42–353)
CALCIUM BLD-MCNC: 9.1 MG/DL (ref 8.5–10.1)
CHLORIDE SERPL-SCNC: 107 MMOL/L (ref 98–112)
CO2 SERPL-SCNC: 30 MMOL/L (ref 21–32)
CORTIS SERPL-MCNC: 19.7 UG/DL
CREAT BLD-MCNC: 1.07 MG/DL
CREAT UR-SCNC: 0.88 G/24 HR (ref 0.95–2.49)
DEPRECATED RDW RBC AUTO: 44.6 FL (ref 35.1–46.3)
EOSINOPHIL # BLD AUTO: 0.19 X10(3) UL (ref 0–0.7)
EOSINOPHIL NFR BLD AUTO: 2.8 %
ERYTHROCYTE [DISTWIDTH] IN BLOOD BY AUTOMATED COUNT: 13.1 % (ref 11–15)
ERYTHROCYTE [SEDIMENTATION RATE] IN BLOOD: 14 MM/HR
FASTING STATUS PATIENT QL REPORTED: NO
FSH SERPL-ACNC: 11 MIU/ML
GFR SERPLBLD BASED ON 1.73 SQ M-ARVRAT: 72 ML/MIN/1.73M2 (ref 60–?)
GLOBULIN PLAS-MCNC: 3.4 G/DL (ref 2.8–4.4)
GLUCOSE BLD-MCNC: 106 MG/DL (ref 70–99)
HCT VFR BLD AUTO: 47.6 %
HGB BLD-MCNC: 15.4 G/DL
IMM GRANULOCYTES # BLD AUTO: 0.03 X10(3) UL (ref 0–1)
IMM GRANULOCYTES NFR BLD: 0.4 %
LH SERPL-ACNC: 4.1 MIU/ML
LYMPHOCYTES # BLD AUTO: 2.47 X10(3) UL (ref 1–4)
LYMPHOCYTES NFR BLD AUTO: 36.1 %
MCH RBC QN AUTO: 30.1 PG (ref 26–34)
MCHC RBC AUTO-ENTMCNC: 32.4 G/DL (ref 31–37)
MCV RBC AUTO: 93.2 FL
MONOCYTES # BLD AUTO: 0.54 X10(3) UL (ref 0.1–1)
MONOCYTES NFR BLD AUTO: 7.9 %
NEUTROPHILS # BLD AUTO: 3.55 X10 (3) UL (ref 1.5–7.7)
NEUTROPHILS # BLD AUTO: 3.55 X10(3) UL (ref 1.5–7.7)
NEUTROPHILS NFR BLD AUTO: 51.8 %
OSMOLALITY SERPL CALC.SUM OF ELEC: 297 MOSM/KG (ref 275–295)
PHOSPHATE SERPL-MCNC: 2.9 MG/DL (ref 2.5–4.9)
PHOSPHATE UR-SCNC: 408.2 MG/24HR (ref 400–1300)
PLATELET # BLD AUTO: 258 10(3)UL (ref 150–450)
POTASSIUM SERPL-SCNC: 4 MMOL/L (ref 3.5–5.1)
PROT SERPL-MCNC: 7.2 G/DL (ref 6.4–8.2)
PTH-INTACT SERPL-MCNC: 45.2 PG/ML (ref 18.5–88)
RBC # BLD AUTO: 5.11 X10(6)UL
SODIUM SERPL-SCNC: 143 MMOL/L (ref 136–145)
SPECIMEN VOL UR: 3140 ML
VIT D+METAB SERPL-MCNC: 43.9 NG/ML (ref 30–100)
WBC # BLD AUTO: 6.9 X10(3) UL (ref 4–11)

## 2023-04-27 PROCEDURE — 82570 ASSAY OF URINE CREATININE: CPT

## 2023-04-27 PROCEDURE — 85652 RBC SED RATE AUTOMATED: CPT

## 2023-04-27 PROCEDURE — 83970 ASSAY OF PARATHORMONE: CPT

## 2023-04-27 PROCEDURE — 82533 TOTAL CORTISOL: CPT

## 2023-04-27 PROCEDURE — 84100 ASSAY OF PHOSPHORUS: CPT

## 2023-04-27 PROCEDURE — 84165 PROTEIN E-PHORESIS SERUM: CPT

## 2023-04-27 PROCEDURE — 83002 ASSAY OF GONADOTROPIN (LH): CPT

## 2023-04-27 PROCEDURE — 36415 COLL VENOUS BLD VENIPUNCTURE: CPT

## 2023-04-27 PROCEDURE — 85025 COMPLETE CBC W/AUTO DIFF WBC: CPT

## 2023-04-27 PROCEDURE — 84080 ASSAY ALKALINE PHOSPHATASES: CPT

## 2023-04-27 PROCEDURE — 83521 IG LIGHT CHAINS FREE EACH: CPT

## 2023-04-27 PROCEDURE — 84410 TESTOSTERONE BIOAVAILABLE: CPT

## 2023-04-27 PROCEDURE — 84105 ASSAY OF URINE PHOSPHORUS: CPT

## 2023-04-27 PROCEDURE — 86334 IMMUNOFIX E-PHORESIS SERUM: CPT

## 2023-04-27 PROCEDURE — 82306 VITAMIN D 25 HYDROXY: CPT

## 2023-04-27 PROCEDURE — 83001 ASSAY OF GONADOTROPIN (FSH): CPT

## 2023-04-27 PROCEDURE — 80053 COMPREHEN METABOLIC PANEL: CPT

## 2023-04-27 PROCEDURE — 82340 ASSAY OF CALCIUM IN URINE: CPT

## 2023-04-29 ENCOUNTER — LAB ENCOUNTER (OUTPATIENT)
Dept: LAB | Facility: HOSPITAL | Age: 78
End: 2023-04-29
Attending: INTERNAL MEDICINE
Payer: MEDICARE

## 2023-04-29 DIAGNOSIS — M81.0 AGE-RELATED OSTEOPOROSIS WITHOUT CURRENT PATHOLOGICAL FRACTURE: ICD-10-CM

## 2023-04-29 PROCEDURE — 82024 ASSAY OF ACTH: CPT

## 2023-04-29 PROCEDURE — 36415 COLL VENOUS BLD VENIPUNCTURE: CPT

## 2023-05-01 LAB
ACTH: 33.8 PG/ML
ALKALINE PHOSPHATASE BONE SPECIFIC: 12.7 UG/L

## 2023-05-02 LAB
ALBUMIN SERPL ELPH-MCNC: 4.4 G/DL (ref 3.75–5.21)
ALBUMIN/GLOB SERPL: 1.7 {RATIO} (ref 1–2)
ALPHA1 GLOB SERPL ELPH-MCNC: 0.27 G/DL (ref 0.19–0.46)
ALPHA2 GLOB SERPL ELPH-MCNC: 0.63 G/DL (ref 0.48–1.05)
B-GLOBULIN SERPL ELPH-MCNC: 0.83 G/DL (ref 0.68–1.23)
GAMMA GLOB SERPL ELPH-MCNC: 0.87 G/DL (ref 0.62–1.7)
KAPPA LC FREE SER-MCNC: 2.2 MG/DL (ref 0.33–1.94)
KAPPA LC FREE/LAMBDA FREE SER NEPH: 1.21 {RATIO} (ref 0.26–1.65)
LAMBDA LC FREE SERPL-MCNC: 1.82 MG/DL (ref 0.57–2.63)
PROT SERPL-MCNC: 7 G/DL (ref 6.4–8.2)
SEX HORM BIND GLOB: 56.9 NMOL/L
TESTOST % FREE+WEAK BND: 12.7 %
TESTOST FREE+WEAK BND: 84 NG/DL
TESTOSTERONE TOT /MS: 661.8 NG/DL

## 2023-05-09 ENCOUNTER — OFFICE VISIT (OUTPATIENT)
Dept: ENDOCRINOLOGY CLINIC | Facility: CLINIC | Age: 78
End: 2023-05-09

## 2023-05-09 VITALS
WEIGHT: 131 LBS | SYSTOLIC BLOOD PRESSURE: 117 MMHG | HEIGHT: 69 IN | DIASTOLIC BLOOD PRESSURE: 67 MMHG | HEART RATE: 78 BPM | BODY MASS INDEX: 19.4 KG/M2

## 2023-05-09 DIAGNOSIS — M81.0 AGE-RELATED OSTEOPOROSIS WITHOUT CURRENT PATHOLOGICAL FRACTURE: Primary | ICD-10-CM

## 2023-05-09 PROCEDURE — 99214 OFFICE O/P EST MOD 30 MIN: CPT | Performed by: INTERNAL MEDICINE

## 2023-05-09 PROCEDURE — 3008F BODY MASS INDEX DOCD: CPT | Performed by: INTERNAL MEDICINE

## 2023-05-09 PROCEDURE — 3078F DIAST BP <80 MM HG: CPT | Performed by: INTERNAL MEDICINE

## 2023-05-09 PROCEDURE — 3074F SYST BP LT 130 MM HG: CPT | Performed by: INTERNAL MEDICINE

## 2023-05-09 PROCEDURE — 1159F MED LIST DOCD IN RCRD: CPT | Performed by: INTERNAL MEDICINE

## 2023-05-09 NOTE — H&P
[facility]    PATIENT'S NAME: Breonna Schuler   ATTENDING PHYSICIAN: Angel Sy.  Yahir Valdez MD   PATIENT ACCOUNT#:   [id number]    [facility]  MEDICAL RECORD #:   [id number]       YOB: 1945  ADMISSION DATE:       08/01/
Resident

## 2023-05-10 ENCOUNTER — TELEPHONE (OUTPATIENT)
Dept: ENDOCRINOLOGY CLINIC | Facility: CLINIC | Age: 78
End: 2023-05-10

## 2023-05-10 NOTE — TELEPHONE ENCOUNTER
Patient is calling to request that after visit summary from recent appointment be sent to his home since his computer is not working well.  Please call

## 2023-05-10 NOTE — TELEPHONE ENCOUNTER
Dr. Víctor Gauthier and spoke to patient regarding mailing last office visit. Patient stated he had an additional question to ask you and is inquiring why Prolia was suggested instead of Evenity? He stated he read up about Evenity that this more common. He stated he doesn't have a preference and trusts your opinion. He is also inquiring if you think that is beneficial for him to keep drinking ensure daily? He stated that he wants to know if he should still purchase this if there is a benefit? Please advise.

## 2023-05-11 NOTE — TELEPHONE ENCOUNTER
Hi!  Please let patient know that Ngozi Tee is not actually more common than Prolia. It has a different indication than Prolia. It is used for building up bone, whereas Prolia is used for preventing bone loss. I think it is the more appropriate treatment for him. I think that if he finds the Ensure beneficial, then he may continue taking this. Tahnk you!

## 2023-05-11 NOTE — TELEPHONE ENCOUNTER
Spoke to patient, verbalized understanding.  Questions regarding IV for Prolia--submitted and still waiting for determination, patient is aware    Patient requesting hand outs for Prolia to be mailed out to patient, placed in mail bin

## 2023-05-23 ENCOUNTER — TELEPHONE (OUTPATIENT)
Dept: ENDOCRINOLOGY CLINIC | Facility: CLINIC | Age: 78
End: 2023-05-23

## 2023-05-23 NOTE — TELEPHONE ENCOUNTER
Also see 5/10/2023 TE - patient following up on prior auth for Prolia. Please call - ok to leave detailed message. Thank you.

## 2023-05-25 NOTE — TELEPHONE ENCOUNTER
Patient calling to follow up on prior authorization for the prolia injection. Patient states that he has not gotten a call back and that he has left several messages. Please see Encounter 5/10/23.

## 2023-05-30 ENCOUNTER — NURSE ONLY (OUTPATIENT)
Dept: ENDOCRINOLOGY CLINIC | Facility: CLINIC | Age: 78
End: 2023-05-30

## 2023-05-30 DIAGNOSIS — M81.0 AGE-RELATED OSTEOPOROSIS WITHOUT CURRENT PATHOLOGICAL FRACTURE: Primary | ICD-10-CM

## 2023-05-30 PROCEDURE — 96372 THER/PROPH/DIAG INJ SC/IM: CPT | Performed by: INTERNAL MEDICINE

## 2023-05-30 NOTE — PROGRESS NOTES
Start time: 3:00 PM  End time: 3:40 PM    Patient presents for his first prolia injection. He was originally prescribed Evenity but due to insurance coverage, he was transitioned to prolia. Patient had the ff questions that RN answered to be best of ability:    1. Why Evenity could not be given:  RN explained that many insurances do not approve Evenity in the male population because there has not been a study in the male population. Insurances are recognizing coverage/indication for female post-menopausal population. RN did explain that sometimes if all bone medications have been tried and failed, it could potentially build an argument for coverage. 2.  Can he make an appointment in advance since there is an expiration on the medication approval, which RN stated yes. 3.  When he can call [employer] to find out what his out of pocket cost would be. Pt made aware office cannot give him exact dollar amount as that would depend on insurance and how the claim would be adjusted. Advised to call Eleanor Saldivar a week after from today to discuss billing. RN also discussed the ff prior to administration:     1. Common side effects: generalized myalgia/arthralgia, headache that could spontaneously resolve. Pt asked if he could take something that will not interfere with his colitis/glaucoma medications. Pt states he has taken tylenol in the past w/o any issues, so RN informed him he can take a tylenol if needed. RN advised to move the arm around where he's getting the injection to minimize discomfort. Pt mentioned that his sister got one prolia injection and developed rash on her lips. RN advised that rash is uncommon but since each patient responds differently to medication to call the office if there are anything new that arise post injection.      2. Medication is subcutaneous injection given once every 6 months and seen by provider at the same time frame w/ completed blood work preferably one week prior to MD appointment. 3. Inform dentist that prolia therapy has been started in case major dental work up is needed as it can increase mouth/jaw infection. Informed patient routine clinic is okay (drilling, implants, extractions would be the area of caution - to notify clinic if such procedures will be done)    Patient gave verbal permission to proceed with injection. This RN administered prolia 60 mg subcutaneous to the right upper arm and tolerated it well. Pt was monitored for about 10 minutes post injection and did not exhibit any immediate adverse effects. RN scheduled appointment for 6 months follow up with Dr. Toni Olson and prolia #2. Pt was also made aware to complete prolia labs 1 week prior to appointment. Pt left the clinic without any issues.     Future Appointments   Date Time Provider Evens Summers   12/5/2023  1:00 PM Della Meyer MD [facility]

## 2023-07-11 ENCOUNTER — TELEPHONE (OUTPATIENT)
Dept: ENDOCRINOLOGY CLINIC | Facility: CLINIC | Age: 78
End: 2023-07-11

## 2023-07-11 NOTE — TELEPHONE ENCOUNTER
Hi!    Please let patient know that this is not a side effect from Prolia. Please ask him to follow up with ophthalmologist or PCP. Thank you.

## 2023-07-11 NOTE — TELEPHONE ENCOUNTER
Dr. Karina Montero,  See message below - patient received first prolia injection on 5/30/23 - please advise if patient should f/u with PCP/UC -thanks

## 2023-07-11 NOTE — TELEPHONE ENCOUNTER
Patient calling regards on going eye pain on upper lid left eye, glaucoma eye and states if that is an side effect from Prolia injection. States feels sore and pain very intense. Please call.

## 2023-07-11 NOTE — TELEPHONE ENCOUNTER
Spoke to patient, states followed up with Ophthalmologist- has appointment tomorrow at 2:30pm.     No further needs

## 2023-11-14 ENCOUNTER — TELEPHONE (OUTPATIENT)
Dept: ENDOCRINOLOGY CLINIC | Facility: CLINIC | Age: 78
End: 2023-11-14

## 2023-11-14 NOTE — TELEPHONE ENCOUNTER
Patient calling regards if fasting requires for labs and if this week is too early to complete labs. Please call.

## 2023-11-15 NOTE — TELEPHONE ENCOUNTER
Pt calling again. He states he needs to know how soon he can have labs before his next appointment or does he have to wait until 7 days prior to appointment.   Please call

## 2023-11-16 NOTE — TELEPHONE ENCOUNTER
Patient informed should be fine for him to have labs required for Prolia injection tomorrow.  Pt scheduled for injection on 12/05 [facility] Significant Event  [employer] of ANOINTING  [facility]    Pt anointed by Father Marsha per request of family.    Alize Chang  Palliative   Pager [phone]

## 2023-11-17 ENCOUNTER — LAB ENCOUNTER (OUTPATIENT)
Dept: LAB | Facility: HOSPITAL | Age: 78
End: 2023-11-17
Attending: INTERNAL MEDICINE
Payer: MEDICARE

## 2023-11-17 DIAGNOSIS — M81.0 AGE-RELATED OSTEOPOROSIS WITHOUT CURRENT PATHOLOGICAL FRACTURE: ICD-10-CM

## 2023-11-17 LAB
ANION GAP SERPL CALC-SCNC: 5 MMOL/L (ref 0–18)
BUN BLD-MCNC: 13 MG/DL (ref 9–23)
BUN/CREAT SERPL: 13.3 (ref 10–20)
CALCIUM BLD-MCNC: 9.4 MG/DL (ref 8.7–10.4)
CHLORIDE SERPL-SCNC: 104 MMOL/L (ref 98–112)
CO2 SERPL-SCNC: 29 MMOL/L (ref 21–32)
CREAT BLD-MCNC: 0.98 MG/DL
EGFRCR SERPLBLD CKD-EPI 2021: 80 ML/MIN/1.73M2 (ref 60–?)
FASTING STATUS PATIENT QL REPORTED: NO
GLUCOSE BLD-MCNC: 89 MG/DL (ref 70–99)
OSMOLALITY SERPL CALC.SUM OF ELEC: 286 MOSM/KG (ref 275–295)
POTASSIUM SERPL-SCNC: 4 MMOL/L (ref 3.5–5.1)
SODIUM SERPL-SCNC: 138 MMOL/L (ref 136–145)
VIT D+METAB SERPL-MCNC: 55.4 NG/ML (ref 30–100)

## 2023-11-17 PROCEDURE — 82306 VITAMIN D 25 HYDROXY: CPT

## 2023-11-17 PROCEDURE — 36415 COLL VENOUS BLD VENIPUNCTURE: CPT

## 2023-11-17 PROCEDURE — 84080 ASSAY ALKALINE PHOSPHATASES: CPT

## 2023-11-17 PROCEDURE — 80048 BASIC METABOLIC PNL TOTAL CA: CPT

## 2023-11-21 LAB — ALKALINE PHOSPHATASE BONE SPECIFIC: 8.4 UG/L

## 2023-12-05 ENCOUNTER — OFFICE VISIT (OUTPATIENT)
Dept: ENDOCRINOLOGY CLINIC | Facility: CLINIC | Age: 78
End: 2023-12-05

## 2023-12-05 VITALS
WEIGHT: 131.19 LBS | HEIGHT: 69 IN | DIASTOLIC BLOOD PRESSURE: 64 MMHG | BODY MASS INDEX: 19.43 KG/M2 | SYSTOLIC BLOOD PRESSURE: 106 MMHG | HEART RATE: 70 BPM

## 2023-12-05 DIAGNOSIS — E55.9 VITAMIN D DEFICIENCY: ICD-10-CM

## 2023-12-05 DIAGNOSIS — M81.0 AGE-RELATED OSTEOPOROSIS WITHOUT CURRENT PATHOLOGICAL FRACTURE: Primary | ICD-10-CM

## 2023-12-05 PROCEDURE — 3078F DIAST BP <80 MM HG: CPT | Performed by: INTERNAL MEDICINE

## 2023-12-05 PROCEDURE — 96372 THER/PROPH/DIAG INJ SC/IM: CPT | Performed by: INTERNAL MEDICINE

## 2023-12-05 PROCEDURE — 3074F SYST BP LT 130 MM HG: CPT | Performed by: INTERNAL MEDICINE

## 2023-12-05 PROCEDURE — 99214 OFFICE O/P EST MOD 30 MIN: CPT | Performed by: INTERNAL MEDICINE

## 2023-12-05 PROCEDURE — 3008F BODY MASS INDEX DOCD: CPT | Performed by: INTERNAL MEDICINE

## 2023-12-05 PROCEDURE — 1159F MED LIST DOCD IN RCRD: CPT | Performed by: INTERNAL MEDICINE

## 2024-01-18 ENCOUNTER — HOSPITAL ENCOUNTER (OUTPATIENT)
Dept: ULTRASOUND IMAGING | Facility: HOSPITAL | Age: 79
Discharge: HOME | End: 2024-01-18
Attending: INTERNAL MEDICINE
Payer: MEDICARE

## 2024-01-18 ENCOUNTER — OFFICE VISIT (OUTPATIENT)
Dept: INTERNAL MEDICINE CLINIC | Facility: CLINIC | Age: 79
End: 2024-01-18

## 2024-01-18 ENCOUNTER — NURSE TRIAGE (OUTPATIENT)
Dept: INTERNAL MEDICINE CLINIC | Facility: CLINIC | Age: 79
End: 2024-01-18

## 2024-01-18 VITALS
SYSTOLIC BLOOD PRESSURE: 99 MMHG | HEIGHT: 69 IN | WEIGHT: 132 LBS | TEMPERATURE: 98 F | HEART RATE: 87 BPM | DIASTOLIC BLOOD PRESSURE: 62 MMHG | BODY MASS INDEX: 19.55 KG/M2 | OXYGEN SATURATION: 96 %

## 2024-01-18 DIAGNOSIS — M79.89 LEFT LEG SWELLING: Primary | ICD-10-CM

## 2024-01-18 DIAGNOSIS — M79.89 LEFT LEG SWELLING: ICD-10-CM

## 2024-01-18 PROCEDURE — 1159F MED LIST DOCD IN RCRD: CPT | Performed by: INTERNAL MEDICINE

## 2024-01-18 PROCEDURE — 3008F BODY MASS INDEX DOCD: CPT | Performed by: INTERNAL MEDICINE

## 2024-01-18 PROCEDURE — 99213 OFFICE O/P EST LOW 20 MIN: CPT | Performed by: INTERNAL MEDICINE

## 2024-01-18 PROCEDURE — 93971 EXTREMITY STUDY: CPT | Performed by: INTERNAL MEDICINE

## 2024-01-18 PROCEDURE — 3074F SYST BP LT 130 MM HG: CPT | Performed by: INTERNAL MEDICINE

## 2024-01-18 PROCEDURE — 1160F RVW MEDS BY RX/DR IN RCRD: CPT | Performed by: INTERNAL MEDICINE

## 2024-01-18 PROCEDURE — 1125F AMNT PAIN NOTED PAIN PRSNT: CPT | Performed by: INTERNAL MEDICINE

## 2024-01-18 PROCEDURE — 3078F DIAST BP <80 MM HG: CPT | Performed by: INTERNAL MEDICINE

## 2024-01-18 NOTE — TELEPHONE ENCOUNTER
Action Requested: Summary for Provider     []  Critical Lab, Recommendations Needed  [] Need Additional Advice  []   FYI    []   Need Orders  [] Need Medications Sent to Pharmacy  []  Other     SUMMARY: Patient calling with rash that is warm and tender to touch. Skin pink, no fever. Area is on his inner thigh, it is raised and slight pain. Appointment scheduled today with MD for evaluation.     Future Appointments   Date Time Provider Department Center   1/18/2024  3:45 PM Priya Underwood MD [facility]   6/11/2024  1:15 PM Adri Lira MD [facility]         Reason for call: Skin  Onset: Tuesday     Reason for Disposition   Localized purple or blood-colored spots or dots that are not from injury or friction (no fever)    Protocols used: Rash or Redness - [id number]

## 2024-01-18 NOTE — PROGRESS NOTES
Subjective:     Patient ID: Toño Cagle Jr. is a 78 year old male.    HPI    History/Other:   He came in today complaining of minimal swelling on the inner part of his left thigh.  He does have varicose veins and on and off he gets swelling but this time on the left thigh it is little tender on touch and slight redness  Review of Systems   Constitutional: Negative.    HENT: Negative.     Eyes: Negative.    Respiratory: Negative.     Cardiovascular: Negative.    Gastrointestinal: Negative.    Endocrine: Negative.    Genitourinary: Negative.    Musculoskeletal: Negative.    Neurological: Negative.    Hematological: Negative.    Psychiatric/Behavioral: Negative.       Current Outpatient Medications   Medication Sig Dispense Refill    mesalamine 1.2 g Oral Tab EC Take 4 tablets (4.8 g total) by mouth daily.      CALCIUM CITRATE OR Take 600 mg by mouth.      Cholecalciferol (VITAMIN D3) 2000 units Oral Tab Take 1 tablet (2,000 Units total) by mouth daily.      latanoprost 0.005 % Ophthalmic Solution nightly.      nitrofurantoin monohydrate macro 100 MG Oral Cap Take 1 capsule (100 mg total) by mouth 2 (two) times daily. 6 capsule 0     Allergies:  Allergies   Allergen Reactions    Sulfa Antibiotics HIVES    Imuran [Azathioprine] OTHER (SEE COMMENTS)     Reaction/DVT L Lower leg       Past Medical History:   Diagnosis Date    Back problem     Colitis     Deep vein thrombosis (HCC)     2014 L lower leg    Glaucoma 11/01/2016    Visual impairment     Glasses/contacts      Past Surgical History:   Procedure Laterality Date    COLONOSCOPY N/A 12/5/2016    Procedure: COLONOSCOPY;  Surgeon: Benito Ahuja MD;  Location: [facility] ENDOSCOPY    COLONOSCOPY N/A 8/1/2019    Procedure: COLONOSCOPY;  Surgeon: Benito Ahuja MD;  Location: [facility] ENDOSCOPY    COLONOSCOPY  12/17/2019         HERNIA SURGERY        Family History   Problem Relation Age of Onset    High Blood Pressure Mother     Other (Other) Mother         Abd  aneurysm    Cancer Father         colon     Breast Cancer Sister         Left Breast removed      Social History:   Social History     Socioeconomic History    Marital status: Single   Tobacco Use    Smoking status: Never    Smokeless tobacco: Never   Vaping Use    Vaping Use: Never used   Substance and Sexual Activity    Alcohol use: Not Currently    Drug use: Never    Sexual activity: Yes     Partners: Male   Other Topics Concern    Caffeine Concern Yes     Comment: soda - 2 cans per day    Exercise Yes     Comment: active lifestyle   Social History Narrative    The patient does not use an assistive device..      The patient does not live in a home with stairs.        Objective:   Physical Exam  Vitals and nursing note reviewed.   Constitutional:       Appearance: Normal appearance.   Cardiovascular:      Rate and Rhythm: Normal rate and regular rhythm.      Pulses: Normal pulses.      Heart sounds: Normal heart sounds.   Pulmonary:      Effort: Pulmonary effort is normal.      Breath sounds: Normal breath sounds.   Abdominal:      Palpations: Abdomen is soft.   Musculoskeletal:      Cervical back: Normal range of motion and neck supple.      Comments: Varicose vein,slight tenderness on the  left thigh   Skin:     General: Skin is warm.   Neurological:      Mental Status: He is alert. Mental status is at baseline.   Psychiatric:         Mood and Affect: Mood normal.         Assessment & Plan:   1. Left leg swelling      Gerardo order Ultrasound venous to r/o dvt    No orders of the defined types were placed in this encounter.      Meds This Visit:  Requested Prescriptions      No prescriptions requested or ordered in this encounter       Imaging & Referrals:  None

## 2024-01-19 ENCOUNTER — TELEPHONE (OUTPATIENT)
Dept: INTERNAL MEDICINE CLINIC | Facility: CLINIC | Age: 79
End: 2024-01-19

## 2024-01-19 NOTE — TELEPHONE ENCOUNTER
pt was called and inform of your message below and he verbalized understanding. Pt has questions.    Pt wants to make sure he can take Ibuprofen as he is taking a high dose of mesalamine 1.2 g Oral Tab EC. Ok for him to take Ibuprofen?      mesalamine 1.2 g Oral Tab EC -- -- 1/24/2023 --   Sig:   Take 4 tablets (4.8 g total) by mouth d

## 2024-01-19 NOTE — TELEPHONE ENCOUNTER
Spoke to patient and relayed Dr. Underwood's message below. Patient verbalized understanding. He wanted to know when he should check back in to provide an update.     Rn relayed that he can give it the weekend and if anything worsens or does not improve, he should call Monday. If he is seeing slow but steady progress, he can monitor. He will call Monday or Tuesday to check in.

## 2024-01-19 NOTE — TELEPHONE ENCOUNTER
Condition update:  Patient seen yesterday by Dr Underwood, sent for ultrasound to check for DVT in left leg.  US was negative for DVT  He has no computer service.  Has been using warm compress, less redness but still quite painful.  He is asking for medication.  Please advise Dr Underwood.

## 2024-01-20 NOTE — TELEPHONE ENCOUNTER
Pt called back, stated ok today,no pain, areas still hard, using , warm compress x 20 mins  3-4 x daily     Pt requesting AVS to be mailed , stated it was Not given to him

## 2024-02-01 ENCOUNTER — TELEPHONE (OUTPATIENT)
Dept: INTERNAL MEDICINE CLINIC | Facility: CLINIC | Age: 79
End: 2024-02-01

## 2024-02-01 NOTE — TELEPHONE ENCOUNTER
Spoke with pt,  verified, pt stated lov 24 for leg swelling, pt had ultrasound venous doppler done, no clot but  was told he has phlebitis superficial on the same area where he had a blood clot.   Pt stated he was advised to use heating/ warm compress for 20-30 mins but not much helped.   The first week it was doing ok but second week not much of improvement   Pt denies of swelling, redness, pain, no other sx but still the area is hard and pronounce.   Pt wants to know how to handle or what is the next step?    Pt was advised to f/u for re eval or if sx gets worse to go to IC/ER,  he agree and stated understanding.   F/u appt made with MD.        LOV 24      Future Appointments   Date Time Provider Department Center   2024  3:00 PM Priya Underwood MD [facility] [place]   2024  1:15 PM Adri Lira MD [facility]

## 2024-02-02 ENCOUNTER — OFFICE VISIT (OUTPATIENT)
Dept: INTERNAL MEDICINE CLINIC | Facility: CLINIC | Age: 79
End: 2024-02-02

## 2024-02-02 VITALS
WEIGHT: 131 LBS | BODY MASS INDEX: 19.4 KG/M2 | HEART RATE: 87 BPM | TEMPERATURE: 99 F | SYSTOLIC BLOOD PRESSURE: 120 MMHG | OXYGEN SATURATION: 98 % | DIASTOLIC BLOOD PRESSURE: 68 MMHG | HEIGHT: 69 IN

## 2024-02-02 DIAGNOSIS — I80.9 THROMBOPHLEBITIS: Primary | ICD-10-CM

## 2024-02-02 PROCEDURE — 99213 OFFICE O/P EST LOW 20 MIN: CPT | Performed by: INTERNAL MEDICINE

## 2024-04-23 NOTE — TELEPHONE ENCOUNTER
Pt is calling back regarding labs. Pt did not have any labs drawn today. Technician was not comfortable drawing without a corrected order. Pt is suppose to have blood drawn prior to a 24 hr urine is done and then after the 24 hr urine. Pt is requesting a correction. Pt will need to find someone to take him back tot he lab.   Please call when orders are done show

## 2024-05-09 ENCOUNTER — TELEPHONE (OUTPATIENT)
Dept: ENDOCRINOLOGY CLINIC | Facility: CLINIC | Age: 79
End: 2024-05-09

## 2024-05-09 NOTE — TELEPHONE ENCOUNTER
Pt's last Prolia injection was on 12/05/2023 with SY.  Pt will be due for next injection on or after 06/06/2024     Pt has an upcoming appt on 06/11/2024 with for Prolia Injection with SY.        Submitted Prolia IV via [employer] portal  Awaiting SOB, 3-5 business days

## 2024-05-09 NOTE — TELEPHONE ENCOUNTER
----- Message from Ame KENYON sent at 12/5/2023  3:40 PM CST -----  Regarding: prolia iv  Pt last Prolia injection 12/05/2023 with DANNY

## 2024-05-14 NOTE — TELEPHONE ENCOUNTER
Received pt's Prolia SOB via [employer] **    PA Required: Yes   Prolia OOP COST: 20%  Facility Fee:N/A  Admin Fee: 20%      PA needed will initiate through [employer]    Copy of SOB placed in scan bin

## 2024-05-28 ENCOUNTER — TELEPHONE (OUTPATIENT)
Dept: ENDOCRINOLOGY CLINIC | Facility: CLINIC | Age: 79
End: 2024-05-28

## 2024-05-29 ENCOUNTER — LAB ENCOUNTER (OUTPATIENT)
Dept: LAB | Facility: HOSPITAL | Age: 79
End: 2024-05-29
Attending: INTERNAL MEDICINE
Payer: MEDICARE

## 2024-05-29 DIAGNOSIS — E55.9 VITAMIN D DEFICIENCY: ICD-10-CM

## 2024-05-29 DIAGNOSIS — M81.0 AGE-RELATED OSTEOPOROSIS WITHOUT CURRENT PATHOLOGICAL FRACTURE: ICD-10-CM

## 2024-05-29 LAB
ALBUMIN SERPL-MCNC: 4.3 G/DL (ref 3.2–4.8)
ALBUMIN/GLOB SERPL: 1.7 {RATIO} (ref 1–2)
ALP LIVER SERPL-CCNC: 57 U/L
ALT SERPL-CCNC: 13 U/L
ANION GAP SERPL CALC-SCNC: 2 MMOL/L (ref 0–18)
AST SERPL-CCNC: 25 U/L (ref ?–34)
BILIRUB SERPL-MCNC: 0.4 MG/DL (ref 0.2–1.1)
BUN BLD-MCNC: 13 MG/DL (ref 9–23)
BUN/CREAT SERPL: 11.8 (ref 10–20)
CALCIUM BLD-MCNC: 9.4 MG/DL (ref 8.7–10.4)
CHLORIDE SERPL-SCNC: 106 MMOL/L (ref 98–112)
CO2 SERPL-SCNC: 32 MMOL/L (ref 21–32)
CREAT BLD-MCNC: 1.1 MG/DL
EGFRCR SERPLBLD CKD-EPI 2021: 70 ML/MIN/1.73M2 (ref 60–?)
FASTING STATUS PATIENT QL REPORTED: NO
GLOBULIN PLAS-MCNC: 2.5 G/DL (ref 2–3.5)
GLUCOSE BLD-MCNC: 94 MG/DL (ref 70–99)
OSMOLALITY SERPL CALC.SUM OF ELEC: 290 MOSM/KG (ref 275–295)
POTASSIUM SERPL-SCNC: 4.1 MMOL/L (ref 3.5–5.1)
PROT SERPL-MCNC: 6.8 G/DL (ref 5.7–8.2)
PTH-INTACT SERPL-MCNC: 49.5 PG/ML (ref 18.5–88)
SODIUM SERPL-SCNC: 140 MMOL/L (ref 136–145)
VIT D+METAB SERPL-MCNC: 57.8 NG/ML (ref 30–100)

## 2024-05-29 PROCEDURE — 84080 ASSAY ALKALINE PHOSPHATASES: CPT

## 2024-05-29 PROCEDURE — 80053 COMPREHEN METABOLIC PANEL: CPT

## 2024-05-29 PROCEDURE — 82306 VITAMIN D 25 HYDROXY: CPT

## 2024-05-29 PROCEDURE — 83970 ASSAY OF PARATHORMONE: CPT

## 2024-05-29 PROCEDURE — 36415 COLL VENOUS BLD VENIPUNCTURE: CPT

## 2024-06-06 NOTE — TELEPHONE ENCOUNTER
Medication PA Requested: Prolia 60mg                                                         CoverMyMeds Used:  Key:  Quantity: 60mg  Day Supply: 6 months  Sig: inject 60mg every 6 months  DX Code:         M81.0                            CPT code (if applicable):     Patient has an appt 6/11/24

## 2024-06-06 NOTE — TELEPHONE ENCOUNTER
Patient called for status on Prolia authorization.  OK to leave detailed message.  Please call.

## 2024-06-07 NOTE — TELEPHONE ENCOUNTER
Received fax from [employer]: Prolia approved 6/7/24 - 6/7/25    Left detailed message for patient.

## 2024-06-11 ENCOUNTER — OFFICE VISIT (OUTPATIENT)
Dept: ENDOCRINOLOGY CLINIC | Facility: CLINIC | Age: 79
End: 2024-06-11

## 2024-06-11 VITALS
HEART RATE: 69 BPM | WEIGHT: 132 LBS | DIASTOLIC BLOOD PRESSURE: 56 MMHG | BODY MASS INDEX: 20 KG/M2 | HEIGHT: 68 IN | SYSTOLIC BLOOD PRESSURE: 101 MMHG

## 2024-06-11 DIAGNOSIS — M81.0 AGE-RELATED OSTEOPOROSIS WITHOUT CURRENT PATHOLOGICAL FRACTURE: Primary | ICD-10-CM

## 2024-06-11 DIAGNOSIS — E55.9 VITAMIN D DEFICIENCY: ICD-10-CM

## 2024-06-11 PROCEDURE — 96372 THER/PROPH/DIAG INJ SC/IM: CPT | Performed by: INTERNAL MEDICINE

## 2024-06-11 PROCEDURE — 99214 OFFICE O/P EST MOD 30 MIN: CPT | Performed by: INTERNAL MEDICINE

## 2024-06-11 NOTE — PROGRESS NOTES
Follow-Up - Reason for Visit: Oateoporosis  Requesting Physician: Dr. Underwood    CHIEF COMPLAINT:    Chief Complaint   Patient presents with    Osteoporosis     Follow up and prolia injection   Pt denies any recent falls/fractures/ or dental work    Last labs done 5/2024      HISTORY OF PRESENT ILLNESS:   Toño Cagle is a 78 year old male who presents with osteoporosis. He had a BMD checked in 2019 which showed osteoporosis. He had been started on calcium citrate. And vitamin D.     He has a diagnosis of Ulcerative Colitis. This was diagnosed at the age of 18 and he has had 6 flares. A few visits ago, we had decided that we would proceed with the Prolia shots for treatment of his osteoporosis. He had the first Prolia shot on:    Prolia shot #1- 5/30/23.   Prolia shot #2- 12/05/23      He is here for his 3rd shot. He tolerated his first shot well and is exercising and eating healthy.    FRACTURE HISTORY  o Low trauma, atraumatic or high trauma (specifics regarding circumstances of fracture) None    o Fall-related fracture and circumstances of fall    o Prior history of fracture(s)     o Site, age at fracture, interventions    o Prior history of osteoporosis- 2019, but no medications started at that time, only calcium and vitamin D.    o Prior history of osteoporosis treatment (medication, age or date used, duration of use, pre or postmenopausal ,when used, and reason for discontinuation)     o Prolonged history of steroids, aromatase inhibitors, anticonvulsants, and other meds that may affect skeleton- cortisone injection    o Chronic use vs. intermittent use with dates of usage    o Secondary conditions associated with osteoporosis    o DXA tests in past ( age or date when performed and results)   PROCEDURE: XR DEXA BONE DENSITOMETRY (CPT=77080)       COMPARISON: [facility], XR DEXA BONE DENSITOMETRY (CPT=77080), 8/20/2019, 3:45 PM.       INDICATIONS: M81.0 Osteoporosis, unspecified  osteoporosis type, unspecified pathological fracture presence       TECHNIQUE: Measurement of bone mineral density of the lumbar spine and hip was performed on a Hologic dual energy x-ray absorptiometry scanner.       FINDINGS:       LEFT FEMORAL NECK   BMD: 0.523 gm/sq. cm. T SCORE: -3.0 Z SCORE: -1.6       LEFT TOTAL HIP   BMD: 0.735 gm/sq. cm. T SCORE: -2.0 Z SCORE: -1.1       PA LUMBAR SPINE (L1 - L4)   BMD: 0.905 gm/sq. cm. T SCORE: -1.7 Z SCORE: -0.6           T scores are a comparison to sex-matched patients with mean peak bone mass and are given in standard deviation (s.d.).  Each 1 s.d. corresponds to approximately 10% below peak normal bone density.         WORLD HEALTH ORGANIZATION CRITERIA   NORMAL T SCORE: Above -1 s.d.     OSTEOPENIA T SCORE: Between -1 and -2.5 s.d.     OSTEOPOROSIS T SCORE: -2.5 s.d.       National Osteoporosis Foundation Clinician's Guide to Prevention and Treatment of Osteoporosis recommendations for treatment:   Post menopausal women and men age 50 and older presenting with the following should be considered for treatment:   * A hip or vertebral (clinical or morphometric) fracture   * T score < -2.5 at the femoral neck or spine after appropriate evaluation to exclude secondary causes.   * Low bone mass (T score between -1.0 and -2.5 at the femoral neck or spine) and a 10-year probability of a hip fracture > 3% or a 10-year probability of a major osteoporosis-related fracture > 20% based on the [place]-adapted WHO algorithm                   Impression   CONCLUSION:   1. Osteoporosis with high fracture risk.   2. When compared to baseline the left hip has gone from 0.708 to 0.735 a  rise of  3.8 %  and the AP lumbar has gone from 0.868 to 0.905 a rise of 4.2 %.           10 year Fracture Risk:   Major Osteoporotic Fracture:  11 %.   Hip Fracture:  5.5 %.             Dictated by (CST): Nicho Quintero MD on 3/09/2023 at 11:50 AM       Finalized by (CST): Nicho Quintero MD on  3/09/2023 at 11:53 AM               REPRODUCTIVE HISTORY  He does not have biological children    PAST MEDICAL HISTORY:   Past Medical History:    Back problem    Colitis    Deep vein thrombosis (HCC)    2014 L lower leg    Glaucoma    Visual impairment    Glasses/contacts   Glaucoma- diagnosed December 2011    SURGICAL HISTORY  Past Surgical History:   Procedure Laterality Date    Colonoscopy N/A 12/5/2016    Procedure: COLONOSCOPY;  Surgeon: Benito Ahuja MD;  Location: [facility] ENDOSCOPY    Colonoscopy N/A 8/1/2019    Procedure: COLONOSCOPY;  Surgeon: Benito Ahuja MD;  Location: [facility] ENDOSCOPY    Colonoscopy  12/17/2019         Hernia surgery     Colonoscopy- Dr. Vides (diagnostic)  Hernia Surgery- 2020, 2022    SOCIAL HISTORY    o Occupation   Teacher  o Country of origin     o Tobacco  None  o Alcohol  None  o Daily calcium intake (food and supplements)  Takes calcium and vitamin D  o Daily exercise  He exercises  FAMILY HISTORY   Family History   Problem Relation Age of Onset    High Blood Pressure Mother     Other (Other) Mother         Abd aneurysm    Cancer Father         colon     Breast Cancer Sister         Left Breast removed     Family history of fractures, osteoporosis, osteopenia  Mother may have had osteoporosis  Sister- developed osteoporosis when she was 52    CURRENT MEDICATIONS:    Current Outpatient Medications   Medication Sig Dispense Refill    mesalamine 1.2 g Oral Tab EC Take 4 tablets (4.8 g total) by mouth daily.      CALCIUM CITRATE OR Take 600 mg by mouth. (Patient not taking: Reported on 2/2/2024)      Cholecalciferol (VITAMIN D3) 2000 units Oral Tab Take 1 tablet (2,000 Units total) by mouth daily.      latanoprost 0.005 % Ophthalmic Solution nightly.         ALLERGIES:  Allergies   Allergen Reactions    Sulfa Antibiotics HIVES    Imuran [Azathioprine] OTHER (SEE COMMENTS)     Reaction/DVT L Lower leg         ASSESSMENTS:     REVIEW OF SYSTEMS:  No falls in past 12 months, no  loss of height   Constitutional: Negative for: weight change, fever, fatigue, cold/heat intolerance  Eyes: Negative for:  Visual changes, proptosis, blurring  ENT: Negative for:  dysphagia, neck swelling, dysphonia  Respiratory: Negative for:  dyspnea, cough  Cardiovascular: Negative for:  chest pain, palpitations, orthopnea  GI: Negative for:  abdominal pain, nausea, vomiting, diarrhea, constipation, bleeding  Neurology: Negative for: headache, numbness, weakness  Genito-Urinary: Negative for: dysuria, frequency  Psychiatric: Negative for:  depression, anxiety  Hematology/Lymphatics: Negative for: bruising, lower extremity edema  Endocrine: Negative for: polyuria, polydypsia  Skin: Negative for: rash, blister, cellulitis,      PHYSICAL EXAM:   Vitals:    06/11/24 1308   BP: 101/56   Pulse: 69   Weight: 132 lb (59.9 kg)   Height: 5' 8\" (1.727 m)         BMI: Body mass index is 20.07 kg/m².     CONSTITUTIONAL:  awake, alert, cooperative, no apparent distress, and appears stated age  PSYCH: normal affect  EYES:  No proptosis, no ptosis, conjunctiva normal  ENT:  Normocephalic, atraumatic  NECK:  Supple, symmetrical, trachea midline, no adenopathy, thyroid symmetric, not enlarged and no tenderness  HEMATOLOGIC/LYMPHATICS:  no cervical lymphadenopathy and no supraclavicular lymphadenopathy  LUNGS: clear to auscultation bilaterally, no crackles or wheezing  CARDIOVASCULAR:  regular rate and rhythm, normal S1 and S2, no S3 or S4  ABDOMEN:  normal bowel sounds, soft, non-distended, non-tender  SKIN:  no bruising or bleeding, no rashes and no lesions  EXTREMITIES:normal pulses, no edema      DATA:   CMP:    Lab Results   Component Value Date     05/29/2024    K 4.1 05/29/2024     05/29/2024    CO2 32.0 05/29/2024    BUN 13 05/29/2024    GLU 94 05/29/2024    ALB 4.3 05/29/2024    CA 9.4 05/29/2024     FLP (Lipid Profile):    Lab Results   Component Value Date    TRIG 69 02/17/2023    HDL 66 (H) 02/17/2023     LDL  direct : No components found for: \"LDLDIRECT\"  Microalbumin/Creatinine ratio:  No components found for: \"RUCREAT\", \"RUMICROAL\", \"MCRATIO\"  TSH:    Lab Results   Component Value Date    TSH 1.790 02/17/2023         ASSESSMENT AND PLAN: This is a 78 year-old man with osteoporosis for the past 3 years here for follow up of management. It is quite possible that this is due to his long-history of ulcerative colitis. We have completed the evaluation into other possible causes and found that there are no other contributing factors.     We have started him on Prolia and he is here for his 3rd shot. He will come in 6 months for his 4th shot after blood tests.    Prior to this encounter, I spent over 15 minutes with preparing for the visit, including reviewing documents from other specialties as well as from PCP and going over test results. During the face to face encounter, I spent an additional 15 minutes which were determined for follow-up. Greater than 50% of the time was spent in counseling, anticipatory guidance, and coordination of care. Patient concerns were answered to the best of my knowledge.     6/11/24  Adri Lira MD

## 2024-06-12 ENCOUNTER — TELEPHONE (OUTPATIENT)
Dept: ENDOCRINOLOGY CLINIC | Facility: CLINIC | Age: 79
End: 2024-06-12

## 2024-06-12 LAB — ALKALINE PHOSPHATASE BONE SPECIFIC: 8.1 UG/L

## 2024-06-12 NOTE — TELEPHONE ENCOUNTER
Patient called to go over test results. Please call. Patient states if he does not answer, then he gives permission for staff to leave a detailed voicemail.

## 2024-06-13 NOTE — TELEPHONE ENCOUNTER
Hi!  Please let patient know that the bone specific alkaline phosphatase is also within normal limits. Thank you!

## 2024-06-14 ENCOUNTER — TELEPHONE (OUTPATIENT)
Dept: ENDOCRINOLOGY CLINIC | Facility: CLINIC | Age: 79
End: 2024-06-14

## 2024-06-14 NOTE — TELEPHONE ENCOUNTER
Left detailed voice message regarding test results as written below by Dr. Lira. Advised pt to call back if he wants to discuss further.

## 2024-06-14 NOTE — TELEPHONE ENCOUNTER
Patient called and asked to get a physical copy of the test results from the Alkaline phosphatafe. Patient asked if he can get his test results sent via mail. Please advise.

## 2024-07-23 ENCOUNTER — TELEPHONE (OUTPATIENT)
Dept: INTERNAL MEDICINE CLINIC | Facility: CLINIC | Age: 79
End: 2024-07-23

## 2024-10-11 ENCOUNTER — TELEPHONE (OUTPATIENT)
Dept: ENDOCRINOLOGY CLINIC | Facility: CLINIC | Age: 79
End: 2024-10-11

## 2024-10-11 NOTE — TELEPHONE ENCOUNTER
Pt's last Prolia injection was on 06/11/24 with SY  . Pt will be due for next injection on or after 12/12/24 with SY.     Pt has an upcoming appt on 12/17/24  for Prolia Injection with SY     No prolia insurance verification needed PA approval already done       Per TE 6/7/2024     Received fax from [employer]: Prolia approved 6/7/24 - 6/7/25

## 2024-10-11 NOTE — TELEPHONE ENCOUNTER
----- Message from Ame KENYON sent at 6/11/2024  3:08 PM CDT -----  Regarding: prolia iv  Pt's last Prolia injection was on 06/11/2024 with SY.  Pt will be due for next injection on or after 12/12/2024 with SY with blood work.

## 2024-10-25 ENCOUNTER — TELEPHONE (OUTPATIENT)
Dept: ENDOCRINOLOGY CLINIC | Facility: CLINIC | Age: 79
End: 2024-10-25

## 2024-10-25 NOTE — TELEPHONE ENCOUNTER
Patient called back to reschedule appointment for follow up and prolia. Asking if 1/18/25 is ok with Dr or not. Please call.

## 2024-10-25 NOTE — TELEPHONE ENCOUNTER
Last LOV and prolia injection was on 6/11/24.     Pt due on/after 12/12/24 for 4th injection. 1/18 is too late for next prolia injection, pt will need to be seen sooner.     Left vm for pt to call back.

## 2024-10-28 NOTE — TELEPHONE ENCOUNTER
Patient calling back, states would like a detailed voice message in regards to appointment due to being in and out the house. Please call.

## 2024-10-29 NOTE — TELEPHONE ENCOUNTER
Patient calling back again to follow up, states again if calling and unable to reach to please leave detailed message with information. States will be at voting polls tomorrow from 9-9:30am.

## 2024-10-30 NOTE — TELEPHONE ENCOUNTER
Spoke with pt and confirmed with him that his appt in January is okay to keep for him to see provider and get his prolia injection. Pt understood and had no additional questions or concerns at this time.

## 2024-12-30 ENCOUNTER — LAB ENCOUNTER (OUTPATIENT)
Dept: LAB | Facility: HOSPITAL | Age: 79
End: 2024-12-30
Attending: INTERNAL MEDICINE
Payer: MEDICARE

## 2024-12-30 DIAGNOSIS — E55.9 VITAMIN D DEFICIENCY: ICD-10-CM

## 2024-12-30 DIAGNOSIS — M81.0 AGE-RELATED OSTEOPOROSIS WITHOUT CURRENT PATHOLOGICAL FRACTURE: ICD-10-CM

## 2024-12-30 LAB
ALBUMIN SERPL-MCNC: 4.4 G/DL (ref 3.2–4.8)
ALBUMIN/GLOB SERPL: 1.8 {RATIO} (ref 1–2)
ALP LIVER SERPL-CCNC: 65 U/L
ALT SERPL-CCNC: 16 U/L
ANION GAP SERPL CALC-SCNC: 7 MMOL/L (ref 0–18)
AST SERPL-CCNC: 23 U/L (ref ?–34)
BILIRUB SERPL-MCNC: 0.3 MG/DL (ref 0.2–1.1)
BUN BLD-MCNC: 17 MG/DL (ref 9–23)
BUN/CREAT SERPL: 15.7 (ref 10–20)
CALCIUM BLD-MCNC: 9.8 MG/DL (ref 8.7–10.4)
CHLORIDE SERPL-SCNC: 107 MMOL/L (ref 98–112)
CO2 SERPL-SCNC: 28 MMOL/L (ref 21–32)
CREAT BLD-MCNC: 1.08 MG/DL
EGFRCR SERPLBLD CKD-EPI 2021: 71 ML/MIN/1.73M2 (ref 60–?)
FASTING STATUS PATIENT QL REPORTED: NO
GLOBULIN PLAS-MCNC: 2.5 G/DL (ref 2–3.5)
GLUCOSE BLD-MCNC: 95 MG/DL (ref 70–99)
OSMOLALITY SERPL CALC.SUM OF ELEC: 295 MOSM/KG (ref 275–295)
POTASSIUM SERPL-SCNC: 4.2 MMOL/L (ref 3.5–5.1)
PROT SERPL-MCNC: 6.9 G/DL (ref 5.7–8.2)
PTH-INTACT SERPL-MCNC: 47.2 PG/ML (ref 18.5–88)
SODIUM SERPL-SCNC: 142 MMOL/L (ref 136–145)
VIT D+METAB SERPL-MCNC: 52.7 NG/ML (ref 30–100)

## 2024-12-30 PROCEDURE — 83970 ASSAY OF PARATHORMONE: CPT

## 2024-12-30 PROCEDURE — 80053 COMPREHEN METABOLIC PANEL: CPT

## 2024-12-30 PROCEDURE — 84080 ASSAY ALKALINE PHOSPHATASES: CPT

## 2024-12-30 PROCEDURE — 82306 VITAMIN D 25 HYDROXY: CPT

## 2024-12-30 PROCEDURE — 36415 COLL VENOUS BLD VENIPUNCTURE: CPT

## 2025-01-03 LAB — ALKALINE PHOSPHATASE BONE SPECIFIC: 8 UG/L

## 2025-01-10 ENCOUNTER — HOSPITAL ENCOUNTER (EMERGENCY)
Facility: HOSPITAL | Age: 80
Discharge: HOME | End: 2025-01-10
Attending: EMERGENCY MEDICINE
Payer: MEDICARE

## 2025-01-10 ENCOUNTER — APPOINTMENT (OUTPATIENT)
Dept: GENERAL RADIOLOGY | Facility: HOSPITAL | Age: 80
End: 2025-01-10
Attending: EMERGENCY MEDICINE
Payer: MEDICARE

## 2025-01-10 VITALS
DIASTOLIC BLOOD PRESSURE: 69 MMHG | WEIGHT: 135 LBS | HEIGHT: 68 IN | OXYGEN SATURATION: 99 % | TEMPERATURE: 98 F | SYSTOLIC BLOOD PRESSURE: 132 MMHG | RESPIRATION RATE: 18 BRPM | BODY MASS INDEX: 20.46 KG/M2 | HEART RATE: 78 BPM

## 2025-01-10 DIAGNOSIS — S86.899A: Primary | ICD-10-CM

## 2025-01-10 PROCEDURE — 99283 EMERGENCY DEPT VISIT LOW MDM: CPT

## 2025-01-10 PROCEDURE — 73560 X-RAY EXAM OF KNEE 1 OR 2: CPT | Performed by: EMERGENCY MEDICINE

## 2025-01-10 RX ORDER — NAPROXEN 500 MG/1
500 TABLET ORAL 2 TIMES DAILY PRN
Qty: 14 TABLET | Refills: 0 | Status: SHIPPED | OUTPATIENT
Start: 2025-01-10 | End: 2025-01-17

## 2025-01-10 NOTE — ED PROVIDER NOTES
Patient Seen in: [facility]      History     Chief Complaint   Patient presents with    Fall     Stated Complaint: fall    Subjective:   HPI      Patient is a 79-year-old gentleman who slipped on the ice and fell today.  He states he fell backwards landing on his back he did bump his head no loss conscious no headache no neck pain complains of discomfort to his right knee and inability to extend his knee with a deformity noted.  Neighbor helped him up and 911 was called.    Objective:     Past Medical History:    Back problem    Colitis    Deep vein thrombosis (HCC)    2014 L lower leg    Glaucoma    Visual impairment    Glasses/contacts              Past Surgical History:   Procedure Laterality Date    Colonoscopy N/A 12/5/2016    Procedure: COLONOSCOPY;  Surgeon: Benito Ahuja MD;  Location: [facility] ENDOSCOPY    Colonoscopy N/A 8/1/2019    Procedure: COLONOSCOPY;  Surgeon: Benito Ahuja MD;  Location: [facility] ENDOSCOPY    Colonoscopy  12/17/2019         Hernia surgery                  Social History     Socioeconomic History    Marital status: Single   Tobacco Use    Smoking status: Never    Smokeless tobacco: Never   Vaping Use    Vaping status: Never Used   Substance and Sexual Activity    Alcohol use: Not Currently    Drug use: Never    Sexual activity: Yes     Partners: Male   Other Topics Concern    Caffeine Concern Yes     Comment: soda - 2 cans per day    Exercise Yes     Comment: active lifestyle   Social History Narrative    The patient does not use an assistive device..      The patient does not live in a home with stairs.                  Physical Exam     ED Triage Vitals [01/10/25 1239]   /74   Pulse 74   Resp 18   Temp 97.9 °F (36.6 °C)   Temp src Oral   SpO2 100 %   O2 Device None (Room air)       Current Vitals:   Vital Signs  BP: 148/74  Pulse: 74  Resp: 18  Temp: 97.9 °F (36.6 °C)  Temp src: Oral    Oxygen Therapy  SpO2: 100 %  O2 Device: None (Room  air)        Physical Exam  Constitutional: Oriented to person, place, and time. Appears well-developed and well-nourished.   HEENT:   Head: Normocephalic and atraumatic.   Right Ear: External ear normal.   Left Ear: External ear normal.   Nose: Nose normal.   Mouth/Throat: Oropharynx is clear and moist.   Eyes: Conjunctivae and EOM are normal. Pupils are equal, round, and reactive to light.   Neck: Neck supple.   Cardiovascular: Normal rate, regular rhythm, normal heart sounds and intact distal pulses.    Pulmonary/Chest: Effort normal and breath sounds normal. No respiratory distress.   Abdominal: Soft. Bowel sounds are normal. Exhibits no distension and no mass. There is no tenderness. There is no rebound and no guarding.   Musculoskeletal: Right knee area has a deformity with a defect proximal to the patella.  Patient is unable to extend his knee.  No significant patellar tenderness lymphadenopathy: No cervical adenopathy.   Neurological: Alert and oriented to person, place, and time. Normal reflexes. No cranial nerve deficit. No motor os sensory defecits noted Coordination normal.   Skin: Skin is warm and dry.   Psychiatric: Normal mood and affect. Behavior is normal. Judgment and thought content normal.   Nursing note and vitals reviewed.        ED Course   Labs Reviewed - No data to display                MDM      Use of independent historian: EMS provided history    I personally reviewed and interpreted the images :     XR KNEE (1 OR 2 VIEWS), RIGHT (CPT=73560)    Result Date: 1/10/2025  CONCLUSION:  1. No acute fracture or subluxation.    Dictated by (CST): Wilbur Machado MD on 1/10/2025 at 4:33 PM     Finalized by (CST): Wilbur Machado MD on 1/10/2025 at 4:34 PM           Vitals:    01/10/25 1239   BP: 148/74   Pulse: 74   Resp: 18   Temp: 97.9 °F (36.6 °C)   TempSrc: Oral   SpO2: 100%   Weight: 61.2 kg   Height: 172.7 cm (5' 8\")     *I personally reviewed and interpreted all ED vitals.    Pulse Ox: 100%,  Room air, Normal         Differential Diagnosis/ Diagnostic Considerations: 79-year-old male with slip on the ice and fall with right knee injury with deformity.  Consider patellar fracture consider patellar tendon injury consider internal derangement    Medical Record Review: I personally reviewed available prior medical records for any recent pertinent discharge summaries, testing, and procedures and reviewed those reports and found office visit gastroenterology 7/31/2020 for ulcerative colitis notes reviewed.    Complicating Factors: The patient already has ulcerative colitis osteoporosis which contribute to the complexity of this ED evaluation.    Social determinants of health:    Prescription drug management:      Shared Decision Making:    ED Course: X-ray findings shared with patient will apply knee immobilizer and encouraged him to follow-up with orthopedics he understands that this will likely need surgical intervention.    Discussion of management with other healthcare providers:    Condition upon leaving the department: Stable          Medical Decision Making      Disposition and Plan     Clinical Impression:  1. Avulsion of patellar tendon, unspecified laterality, initial encounter         Disposition:  Discharge  1/10/2025  4:43 pm    Follow-up:  Behery, Omar Atef, MD  [address] [place] [id number]  [phone]    Follow up      We recommend that you schedule follow up care with a primary care provider within the next three months to obtain basic health screening including reassessment of your blood pressure.      Medications Prescribed:  Current Discharge Medication List              Supplementary Documentation:

## 2025-01-10 NOTE — ED INITIAL ASSESSMENT (HPI)
Pt to ED for c/o mechanical fall on the way to collect mail. Pt fell on his back and hit the back of his head. Pt unsure why he can't straighten his right leg; no trauma to right leg. Denies taking blood thinners

## 2025-01-15 ENCOUNTER — TELEPHONE (OUTPATIENT)
Dept: CASE MANAGEMENT | Facility: HOSPITAL | Age: 80
End: 2025-01-15

## 2025-01-15 NOTE — CM/SW NOTE
Patient called looking for a sooner MRI appointment for his knee. Noted no order for MRI. Patient stated he saw Dr Castaneda and he sent him for a MRI at one of the MD's centers and they cannot do it for another 2 weeks. Patient states having symptoms swelling and pain. Suggested to call Dr Castaneda and notify MD and see if MD can write script for stat MRI. Pt verbalized understanding

## 2025-01-18 ENCOUNTER — OFFICE VISIT (OUTPATIENT)
Dept: INTERNAL MEDICINE CLINIC | Facility: CLINIC | Age: 80
End: 2025-01-18

## 2025-01-18 VITALS
SYSTOLIC BLOOD PRESSURE: 115 MMHG | WEIGHT: 137.38 LBS | HEART RATE: 68 BPM | BODY MASS INDEX: 20.82 KG/M2 | DIASTOLIC BLOOD PRESSURE: 68 MMHG | HEIGHT: 68 IN | OXYGEN SATURATION: 100 % | TEMPERATURE: 98 F

## 2025-01-18 DIAGNOSIS — S80.11XA HEMATOMA OF RIGHT LOWER LEG: ICD-10-CM
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
## 2025-01-18 DIAGNOSIS — M25.561 ACUTE PAIN OF RIGHT KNEE: ICD-10-CM

## 2025-01-18 PROCEDURE — 1159F MED LIST DOCD IN RCRD: CPT | Performed by: INTERNAL MEDICINE

## 2025-01-18 PROCEDURE — 1160F RVW MEDS BY RX/DR IN RCRD: CPT | Performed by: INTERNAL MEDICINE

## 2025-01-18 PROCEDURE — 1125F AMNT PAIN NOTED PAIN PRSNT: CPT | Performed by: INTERNAL MEDICINE

## 2025-01-18 PROCEDURE — 99214 OFFICE O/P EST MOD 30 MIN: CPT | Performed by: INTERNAL MEDICINE

## 2025-01-18 NOTE — PROGRESS NOTES
Subjective:     Patient ID: Toño Cagle Jr. is a 79 year old male.    Patient comes in today for follow-up after he had fallen trying to get the mail landed on his back injured his knee and leg went to ER had x-rays done which did not really show anything patient was given referral to orthopedic but could not get to him at call the hospital was given referral to DR. Gee Castaneda with [facility] when he saw MRI was ordered to be done next week on Thursday at his office and then will follow regarding any thing else.  The pain has gotten better he does have bruising going all the way down to the ankle from the top of the knee        History/Other:   Review of Systems   Constitutional: Negative.  Negative for fatigue and fever.   HENT: Negative.  Negative for congestion.    Eyes: Negative.    Respiratory: Negative.  Negative for cough, shortness of breath and wheezing.    Cardiovascular: Negative.  Negative for chest pain, palpitations and leg swelling.   Gastrointestinal: Negative.    Endocrine: Negative for cold intolerance and heat intolerance.   Genitourinary: Negative.  Negative for dysuria, flank pain and hematuria.   Musculoskeletal: Negative.  Negative for arthralgias, back pain and myalgias.        Rt knee pain, rt leg swelling and bruising    Skin: Negative.    Neurological: Negative.  Negative for dizziness, tremors, syncope, weakness and headaches.   Psychiatric/Behavioral: Negative.  Negative for agitation, behavioral problems and suicidal ideas. The patient is not nervous/anxious.      Current Outpatient Medications   Medication Sig Dispense Refill    mesalamine 1.2 g Oral Tab EC Take 4 tablets (4.8 g total) by mouth daily.      Cholecalciferol (VITAMIN D3) 2000 units Oral Tab Take 1 tablet (2,000 Units total) by mouth daily.      latanoprost 0.005 % Ophthalmic Solution nightly.      CALCIUM CITRATE OR Take 600 mg by mouth. (Patient not taking: Reported on 1/18/2025)        Allergies:Allergies[1]    Past Medical History:    Back problem    Colitis    Deep vein thrombosis (HCC)    2014 L lower leg    Glaucoma    Visual impairment    Glasses/contacts      Past Surgical History:   Procedure Laterality Date    Colonoscopy N/A 12/5/2016    Procedure: COLONOSCOPY;  Surgeon: Benito Ahuja MD;  Location: [facility] ENDOSCOPY    Colonoscopy N/A 8/1/2019    Procedure: COLONOSCOPY;  Surgeon: Benito Ahuja MD;  Location: [facility] ENDOSCOPY    Colonoscopy  12/17/2019         Hernia surgery        Family History   Problem Relation Age of Onset    High Blood Pressure Mother     Other (Other) Mother         Abd aneurysm    Cancer Father         colon     Breast Cancer Sister         Left Breast removed      Social History:   Social History     Socioeconomic History    Marital status: Single   Tobacco Use    Smoking status: Never    Smokeless tobacco: Never   Vaping Use    Vaping status: Never Used   Substance and Sexual Activity    Alcohol use: Not Currently    Drug use: Never    Sexual activity: Yes     Partners: Male   Other Topics Concern    Caffeine Concern Yes     Comment: soda - 2 cans per day    Exercise Yes     Comment: active lifestyle   Social History Narrative    The patient does not use an assistive device..      The patient does not live in a home with stairs.        Objective:   Physical Exam  Vitals and nursing note reviewed.   Constitutional:       Appearance: He is well-developed.   HENT:      Head: Normocephalic and atraumatic.      Right Ear: External ear normal.      Left Ear: External ear normal.      Nose: Nose normal.   Eyes:      Conjunctiva/sclera: Conjunctivae normal.      Pupils: Pupils are equal, round, and reactive to light.   Cardiovascular:      Rate and Rhythm: Normal rate and regular rhythm.      Heart sounds: Normal heart sounds.   Pulmonary:      Effort: Pulmonary effort is normal.      Breath sounds: Normal breath sounds.   Abdominal:      General: Bowel  sounds are normal.      Palpations: Abdomen is soft.   Genitourinary:     Penis: Normal.       Prostate: Normal.      Rectum: Normal.   Musculoskeletal:         General: Swelling and tenderness present. Normal range of motion.      Cervical back: Normal range of motion and neck supple.      Comments: Rt knee pain, rt leg swelling and bruising    Skin:     General: Skin is warm and dry.   Neurological:      Mental Status: He is alert and oriented to person, place, and time.      Deep Tendon Reflexes: Reflexes are normal and symmetric.         Assessment & Plan:   1. Fall, initial encounter with the right knee leg injury seen Ortho scheduled for MRI has a follow-up appointment after that   2. Acute pain of right knee as above as need  medication for pain    Patient will come in for annual physical in the near future       No orders of the defined types were placed in this encounter.      Meds This Visit:  Requested Prescriptions      No prescriptions requested or ordered in this encounter       Imaging & Referrals:  None          [1]   Allergies  Allergen Reactions    Sulfa Antibiotics HIVES    Imuran [Azathioprine] OTHER (SEE COMMENTS)     Reaction/DVT L Lower leg

## 2025-01-21 ENCOUNTER — TELEPHONE (OUTPATIENT)
Dept: INTERNAL MEDICINE CLINIC | Facility: CLINIC | Age: 80
End: 2025-01-21

## 2025-01-21 NOTE — TELEPHONE ENCOUNTER
Patient contact clinic inquiring if he should be using ice or heat to leg bruising.  Nurse advised ice is typically used initially to reduce pain and inflammation.  He may use heat at this time to stimulate blood flow and aid in reabsorbing blood/bruising.  Patient verbalized understanding.     Patient requests AVS be printed and mailed to his home.  Routed to on site staff for assistance.

## 2025-01-21 NOTE — TELEPHONE ENCOUNTER
Patient is requesting the after visit notes be mailed to his home address.    Patient mentions he visited Dr. Yasmine Mckinley on 1/18/25 and did not get his copy.

## 2025-01-27 ENCOUNTER — TELEPHONE (OUTPATIENT)
Dept: ENDOCRINOLOGY CLINIC | Facility: CLINIC | Age: 80
End: 2025-01-27

## 2025-01-29 ENCOUNTER — TELEPHONE (OUTPATIENT)
Dept: INTERNAL MEDICINE CLINIC | Facility: CLINIC | Age: 80
End: 2025-01-29

## 2025-01-29 NOTE — TELEPHONE ENCOUNTER
Called pt per note below pt had a fall last week& due to the fall he went to see his pcp  & indicated surgery may be needed. Pt stated he hasn't decided if he will move forward with the surgery so once he has decided he will call us to schedule f/u with MD +prolia.

## 2025-01-29 NOTE — TELEPHONE ENCOUNTER
CHALO Schrader    Patient called and he wanted to know if  can view a MRI he had done as he wants to discuss it with Dr. Underwood at the office visit on 2/3/25. Patient was inform he can not view it but he can have the other office fax it to . Patient was provided with the fax number  [phone]. He will give them a call today.     Future Appointments   Date Time Provider Department Center   2/3/2025  1:00 PM Elías Underwood MD [facility] [place]   3/28/2025  2:00 PM [facility] DEXA RM1 [facility] DEXA EM [facility]

## 2025-02-03 ENCOUNTER — OFFICE VISIT (OUTPATIENT)
Dept: INTERNAL MEDICINE CLINIC | Facility: CLINIC | Age: 80
End: 2025-02-03

## 2025-02-03 ENCOUNTER — LAB ENCOUNTER (OUTPATIENT)
Dept: LAB | Facility: HOSPITAL | Age: 80
End: 2025-02-03
Attending: INTERNAL MEDICINE
Payer: MEDICARE

## 2025-02-03 VITALS
TEMPERATURE: 98 F | RESPIRATION RATE: 18 BRPM | WEIGHT: 129 LBS | HEIGHT: 68 IN | OXYGEN SATURATION: 99 % | HEART RATE: 77 BPM | BODY MASS INDEX: 19.55 KG/M2 | DIASTOLIC BLOOD PRESSURE: 61 MMHG | SYSTOLIC BLOOD PRESSURE: 123 MMHG

## 2025-02-03 DIAGNOSIS — Z01.818 PRE-OP EXAM: ICD-10-CM

## 2025-02-03 DIAGNOSIS — Z01.818 PRE-OP EXAM: Primary | ICD-10-CM

## 2025-02-03 LAB
ALBUMIN SERPL-MCNC: 4.5 G/DL (ref 3.2–4.8)
ALBUMIN/GLOB SERPL: 1.9 {RATIO} (ref 1–2)
ALP LIVER SERPL-CCNC: 73 U/L
ALT SERPL-CCNC: 13 U/L
ANION GAP SERPL CALC-SCNC: 6 MMOL/L (ref 0–18)
APTT PPP: 24.9 SECONDS (ref 23–36)
AST SERPL-CCNC: 18 U/L (ref ?–34)
ATRIAL RATE: 73 BPM
BASOPHILS # BLD AUTO: 0.09 X10(3) UL (ref 0–0.2)
BASOPHILS NFR BLD AUTO: 1.1 %
BILIRUB SERPL-MCNC: 0.4 MG/DL (ref 0.2–1.1)
BUN BLD-MCNC: 16 MG/DL (ref 9–23)
BUN/CREAT SERPL: 14.4 (ref 10–20)
CALCIUM BLD-MCNC: 9.4 MG/DL (ref 8.7–10.4)
CHLORIDE SERPL-SCNC: 101 MMOL/L (ref 98–112)
CO2 SERPL-SCNC: 31 MMOL/L (ref 21–32)
CREAT BLD-MCNC: 1.11 MG/DL
DEPRECATED RDW RBC AUTO: 45.6 FL (ref 35.1–46.3)
EGFRCR SERPLBLD CKD-EPI 2021: 68 ML/MIN/1.73M2 (ref 60–?)
EOSINOPHIL # BLD AUTO: 0.35 X10(3) UL (ref 0–0.7)
EOSINOPHIL NFR BLD AUTO: 4.3 %
ERYTHROCYTE [DISTWIDTH] IN BLOOD BY AUTOMATED COUNT: 13.4 % (ref 11–15)
EST. AVERAGE GLUCOSE BLD GHB EST-MCNC: 128 MG/DL (ref 68–126)
FASTING STATUS PATIENT QL REPORTED: NO
GLOBULIN PLAS-MCNC: 2.4 G/DL (ref 2–3.5)
GLUCOSE BLD-MCNC: 96 MG/DL (ref 70–99)
HBA1C MFR BLD: 6.1 % (ref ?–5.7)
HCT VFR BLD AUTO: 44.9 %
HGB BLD-MCNC: 14.6 G/DL
IMM GRANULOCYTES # BLD AUTO: 0.04 X10(3) UL (ref 0–1)
IMM GRANULOCYTES NFR BLD: 0.5 %
INR BLD: 0.94 (ref 0.8–1.2)
LYMPHOCYTES # BLD AUTO: 2.15 X10(3) UL (ref 1–4)
LYMPHOCYTES NFR BLD AUTO: 26.4 %
MCH RBC QN AUTO: 30 PG (ref 26–34)
MCHC RBC AUTO-ENTMCNC: 32.5 G/DL (ref 31–37)
MCV RBC AUTO: 92.4 FL
MONOCYTES # BLD AUTO: 0.75 X10(3) UL (ref 0.1–1)
MONOCYTES NFR BLD AUTO: 9.2 %
NEUTROPHILS # BLD AUTO: 4.75 X10 (3) UL (ref 1.5–7.7)
NEUTROPHILS # BLD AUTO: 4.75 X10(3) UL (ref 1.5–7.7)
NEUTROPHILS NFR BLD AUTO: 58.5 %
OSMOLALITY SERPL CALC.SUM OF ELEC: 287 MOSM/KG (ref 275–295)
P AXIS: 80 DEGREES
P-R INTERVAL: 146 MS
PLATELET # BLD AUTO: 237 10(3)UL (ref 150–450)
POTASSIUM SERPL-SCNC: 4.2 MMOL/L (ref 3.5–5.1)
PROT SERPL-MCNC: 6.9 G/DL (ref 5.7–8.2)
PROTHROMBIN TIME: 13.2 SECONDS (ref 11.6–14.8)
Q-T INTERVAL: 382 MS
QRS DURATION: 88 MS
QTC CALCULATION (BEZET): 420 MS
R AXIS: 65 DEGREES
RBC # BLD AUTO: 4.86 X10(6)UL
SODIUM SERPL-SCNC: 138 MMOL/L (ref 136–145)
T AXIS: 60 DEGREES
VENTRICULAR RATE: 73 BPM
WBC # BLD AUTO: 8.1 X10(3) UL (ref 4–11)

## 2025-02-03 PROCEDURE — 83036 HEMOGLOBIN GLYCOSYLATED A1C: CPT

## 2025-02-03 PROCEDURE — 36415 COLL VENOUS BLD VENIPUNCTURE: CPT

## 2025-02-03 PROCEDURE — 85610 PROTHROMBIN TIME: CPT

## 2025-02-03 PROCEDURE — 85025 COMPLETE CBC W/AUTO DIFF WBC: CPT

## 2025-02-03 PROCEDURE — 85730 THROMBOPLASTIN TIME PARTIAL: CPT

## 2025-02-03 PROCEDURE — 80053 COMPREHEN METABOLIC PANEL: CPT

## 2025-02-03 NOTE — PROGRESS NOTES
Subjective:     Patient ID: Toño Cagle Jr. is a 79 year old male.    Patient comes in today for preop exam for right knee surgery he had an MRI which showed partial quadriceps tear continues to have swelling and pain  Denies any chest pain shortness of breath         History/Other:   Review of Systems   Constitutional: Negative.  Negative for fatigue and fever.   HENT: Negative.  Negative for congestion.    Eyes: Negative.    Respiratory: Negative.  Negative for cough, shortness of breath and wheezing.    Cardiovascular: Negative.  Negative for chest pain, palpitations and leg swelling.   Gastrointestinal: Negative.    Endocrine: Negative for cold intolerance and heat intolerance.   Genitourinary: Negative.  Negative for dysuria, flank pain and hematuria.   Musculoskeletal:  Positive for joint swelling. Negative for arthralgias, back pain and myalgias.   Skin: Negative.    Neurological: Negative.  Negative for dizziness, tremors, syncope, weakness and headaches.   Psychiatric/Behavioral: Negative.  Negative for agitation, behavioral problems and suicidal ideas. The patient is not nervous/anxious.      Current Outpatient Medications   Medication Sig Dispense Refill    mesalamine 1.2 g Oral Tab EC Take 4 tablets (4.8 g total) by mouth daily.      Cholecalciferol (VITAMIN D3) 2000 units Oral Tab Take 1 tablet (2,000 Units total) by mouth daily.      latanoprost 0.005 % Ophthalmic Solution nightly.      CALCIUM CITRATE OR Take 600 mg by mouth. (Patient not taking: Reported on 2/2/2024)       Allergies:Allergies[1]    Past Medical History:    Back problem    Colitis    Deep vein thrombosis (HCC)    2014 L lower leg    Glaucoma    Visual impairment    Glasses/contacts      Past Surgical History:   Procedure Laterality Date    Colonoscopy N/A 12/5/2016    Procedure: COLONOSCOPY;  Surgeon: Benito Ahuja MD;  Location: [facility] ENDOSCOPY    Colonoscopy N/A 8/1/2019    Procedure: COLONOSCOPY;  Surgeon: Benito Ahuja  MD Lou[place];  Location: [facility] ENDOSCOPY    Colonoscopy  12/17/2019         Hernia surgery        Family History   Problem Relation Age of Onset    High Blood Pressure Mother     Other (Other) Mother         Abd aneurysm    Cancer Father         colon     Breast Cancer Sister         Left Breast removed      Social History:   Social History     Socioeconomic History    Marital status: Single   Tobacco Use    Smoking status: Never    Smokeless tobacco: Never   Vaping Use    Vaping status: Never Used   Substance and Sexual Activity    Alcohol use: Not Currently    Drug use: Never    Sexual activity: Yes     Partners: Male   Other Topics Concern    Caffeine Concern Yes     Comment: soda - 2 cans per day    Exercise Yes     Comment: active lifestyle   Social History Narrative    The patient does not use an assistive device..      The patient does not live in a home with stairs.        Objective:   Physical Exam  Vitals and nursing note reviewed.   Constitutional:       Appearance: He is well-developed.   HENT:      Head: Normocephalic and atraumatic.      Right Ear: External ear normal.      Left Ear: External ear normal.      Nose: Nose normal.   Eyes:      Conjunctiva/sclera: Conjunctivae normal.      Pupils: Pupils are equal, round, and reactive to light.   Cardiovascular:      Rate and Rhythm: Normal rate and regular rhythm.      Heart sounds: Normal heart sounds.   Pulmonary:      Effort: Pulmonary effort is normal.      Breath sounds: Normal breath sounds.   Abdominal:      General: Bowel sounds are normal.      Palpations: Abdomen is soft.   Genitourinary:     Penis: Normal.       Prostate: Normal.      Rectum: Normal.   Musculoskeletal:         General: Swelling and tenderness present. Normal range of motion.      Cervical back: Normal range of motion and neck supple.      Comments: Rt knee   Skin:     General: Skin is warm and dry.   Neurological:      Mental Status: He is alert and oriented to person, place, and  time.      Deep Tendon Reflexes: Reflexes are normal and symmetric.         Assessment & Plan:   1. Pre-op exam exam as above EKG normal rate normal rhythm no ST-T wave changes patient had cardiac calcium score done few years back which was okay no need for any further cardiac workup will follow the labs if everything okay we will clear him for surgery   Addendum  ;  labs are okay patient cleared for surgery with low risk    Orders Placed This Encounter   Procedures    CBC With Differential With Platelet    Comp Metabolic Panel (14)    Hemoglobin A1C    Prothrombin Time (PT) [E]    PTT, Activated [E]       Meds This Visit:  Requested Prescriptions      No prescriptions requested or ordered in this encounter       Imaging & Referrals:  ELECTROCARDIOGRAM, COMPLETE            [1]   Allergies  Allergen Reactions    Sulfa Antibiotics HIVES    Imuran [Azathioprine] OTHER (SEE COMMENTS)     Reaction/DVT L Lower leg

## 2025-02-04 ENCOUNTER — TELEPHONE (OUTPATIENT)
Dept: CASE MANAGEMENT | Facility: HOSPITAL | Age: 80
End: 2025-02-04

## 2025-02-04 NOTE — CM/SW NOTE
Received call from patient informing EDCM that he is having ortho surgery at [facility] on 2/10/25 and will need rehab post hospital stay.    Patient was inquiring about any resources we have for SNFs so that he can decide where he wants to go post surgery/hospital stay.    ISABELM emailed SNF list (that we have in the ER)to patient via his friend's email address:    [email or web address]    Patient was grateful for the assistance.

## 2025-02-05 ENCOUNTER — TELEPHONE (OUTPATIENT)
Dept: CASE MANAGEMENT | Facility: HOSPITAL | Age: 80
End: 2025-02-05

## 2025-02-05 ENCOUNTER — TELEPHONE (OUTPATIENT)
Facility: CLINIC | Age: 80
End: 2025-02-05

## 2025-02-05 NOTE — TELEPHONE ENCOUNTER
Patient calling to state ortho surgeon has not received medical clearance and results asking for those to be sent, needs to be received by 02/06/25 for surgery with Dr. Castaneda on 02/11/25. To be faxed to [phone].

## 2025-02-11 ENCOUNTER — HOSPITAL ENCOUNTER (OUTPATIENT)
Facility: HOSPITAL | Age: 80
Discharge: NURSING, REHAB OR LONG-TERM CARE | End: 2025-02-14
Attending: ORTHOPAEDIC SURGERY | Admitting: ORTHOPAEDIC SURGERY
Payer: MEDICARE

## 2025-02-11 ENCOUNTER — ANESTHESIA EVENT (OUTPATIENT)
Dept: SURGERY | Facility: HOSPITAL | Age: 80
End: 2025-02-11
Payer: MEDICARE

## 2025-02-11 ENCOUNTER — ANESTHESIA (OUTPATIENT)
Dept: SURGERY | Facility: HOSPITAL | Age: 80
End: 2025-02-11
Payer: MEDICARE

## 2025-02-11 DIAGNOSIS — S76.111A TRAUMATIC RUPTURE OF RIGHT QUADRICEPS TENDON, INITIAL ENCOUNTER: Primary | ICD-10-CM

## 2025-02-11 PROCEDURE — 99222 1ST HOSP IP/OBS MODERATE 55: CPT | Performed by: HOSPITALIST

## 2025-02-11 PROCEDURE — 0LQL0ZZ REPAIR RIGHT UPPER LEG TENDON, OPEN APPROACH: ICD-10-PCS | Performed by: ORTHOPAEDIC SURGERY

## 2025-02-11 DEVICE — IMPLANTABLE DEVICE
Type: IMPLANTABLE DEVICE | Site: LEG | Status: FUNCTIONAL
Brand: BIOINDUCTIVE IMPLANT WITH ARTHROSCOPIC DELIVERY SYSTEM - LARGE

## 2025-02-11 RX ORDER — MORPHINE SULFATE 10 MG/ML
6 INJECTION, SOLUTION INTRAMUSCULAR; INTRAVENOUS EVERY 10 MIN PRN
Status: DISCONTINUED | OUTPATIENT
Start: 2025-02-11 | End: 2025-02-11 | Stop reason: HOSPADM

## 2025-02-11 RX ORDER — SODIUM PHOSPHATE, DIBASIC AND SODIUM PHOSPHATE, MONOBASIC 7; 19 G/230ML; G/230ML
1 ENEMA RECTAL ONCE AS NEEDED
Status: DISCONTINUED | OUTPATIENT
Start: 2025-02-11 | End: 2025-02-14

## 2025-02-11 RX ORDER — IBUPROFEN 600 MG/1
600 TABLET, FILM COATED ORAL EVERY 6 HOURS SCHEDULED
Status: DISCONTINUED | OUTPATIENT
Start: 2025-02-11 | End: 2025-02-14

## 2025-02-11 RX ORDER — OXYCODONE HYDROCHLORIDE 5 MG/1
10 TABLET ORAL EVERY 4 HOURS PRN
Status: DISCONTINUED | OUTPATIENT
Start: 2025-02-11 | End: 2025-02-14

## 2025-02-11 RX ORDER — POLYETHYLENE GLYCOL 3350 17 G/17G
17 POWDER, FOR SOLUTION ORAL DAILY PRN
Status: DISCONTINUED | OUTPATIENT
Start: 2025-02-11 | End: 2025-02-14

## 2025-02-11 RX ORDER — SODIUM CHLORIDE, SODIUM LACTATE, POTASSIUM CHLORIDE, CALCIUM CHLORIDE 600; 310; 30; 20 MG/100ML; MG/100ML; MG/100ML; MG/100ML
INJECTION, SOLUTION INTRAVENOUS CONTINUOUS
Status: DISCONTINUED | OUTPATIENT
Start: 2025-02-11 | End: 2025-02-13

## 2025-02-11 RX ORDER — SENNOSIDES 8.6 MG
17.2 TABLET ORAL NIGHTLY
Status: DISCONTINUED | OUTPATIENT
Start: 2025-02-11 | End: 2025-02-14

## 2025-02-11 RX ORDER — BISACODYL 10 MG
10 SUPPOSITORY, RECTAL RECTAL
Status: DISCONTINUED | OUTPATIENT
Start: 2025-02-11 | End: 2025-02-14

## 2025-02-11 RX ORDER — HYDROMORPHONE HYDROCHLORIDE 1 MG/ML
0.4 INJECTION, SOLUTION INTRAMUSCULAR; INTRAVENOUS; SUBCUTANEOUS EVERY 2 HOUR PRN
Status: DISCONTINUED | OUTPATIENT
Start: 2025-02-11 | End: 2025-02-14

## 2025-02-11 RX ORDER — MESALAMINE 400 MG/1
1600 CAPSULE, DELAYED RELEASE ORAL
Status: DISCONTINUED | OUTPATIENT
Start: 2025-02-12 | End: 2025-02-12

## 2025-02-11 RX ORDER — ACETAMINOPHEN 500 MG
1000 TABLET ORAL ONCE
Status: COMPLETED | OUTPATIENT
Start: 2025-02-11 | End: 2025-02-11

## 2025-02-11 RX ORDER — OXYCODONE HYDROCHLORIDE 5 MG/1
5 TABLET ORAL EVERY 4 HOURS PRN
Status: DISCONTINUED | OUTPATIENT
Start: 2025-02-11 | End: 2025-02-14

## 2025-02-11 RX ORDER — DEXAMETHASONE SODIUM PHOSPHATE 4 MG/ML
VIAL (ML) INJECTION AS NEEDED
Status: DISCONTINUED | OUTPATIENT
Start: 2025-02-11 | End: 2025-02-11 | Stop reason: SURG

## 2025-02-11 RX ORDER — DOCUSATE SODIUM 100 MG/1
100 CAPSULE, LIQUID FILLED ORAL 2 TIMES DAILY
Status: DISCONTINUED | OUTPATIENT
Start: 2025-02-11 | End: 2025-02-14

## 2025-02-11 RX ORDER — HYDROMORPHONE HYDROCHLORIDE 1 MG/ML
0.6 INJECTION, SOLUTION INTRAMUSCULAR; INTRAVENOUS; SUBCUTANEOUS EVERY 5 MIN PRN
Status: DISCONTINUED | OUTPATIENT
Start: 2025-02-11 | End: 2025-02-11 | Stop reason: HOSPADM

## 2025-02-11 RX ORDER — MESALAMINE 1.2 G/1
4.8 TABLET, DELAYED RELEASE ORAL DAILY
Status: DISCONTINUED | OUTPATIENT
Start: 2025-02-12 | End: 2025-02-11 | Stop reason: RX

## 2025-02-11 RX ORDER — TRAMADOL HYDROCHLORIDE 50 MG/1
50 TABLET ORAL EVERY 6 HOURS SCHEDULED
Status: DISCONTINUED | OUTPATIENT
Start: 2025-02-11 | End: 2025-02-14

## 2025-02-11 RX ORDER — LATANOPROST 50 UG/ML
1 SOLUTION/ DROPS OPHTHALMIC NIGHTLY
Status: DISCONTINUED | OUTPATIENT
Start: 2025-02-11 | End: 2025-02-14

## 2025-02-11 RX ORDER — MORPHINE SULFATE 4 MG/ML
2 INJECTION, SOLUTION INTRAMUSCULAR; INTRAVENOUS EVERY 10 MIN PRN
Status: DISCONTINUED | OUTPATIENT
Start: 2025-02-11 | End: 2025-02-11 | Stop reason: HOSPADM

## 2025-02-11 RX ORDER — HYDROMORPHONE HYDROCHLORIDE 1 MG/ML
0.8 INJECTION, SOLUTION INTRAMUSCULAR; INTRAVENOUS; SUBCUTANEOUS EVERY 2 HOUR PRN
Status: DISCONTINUED | OUTPATIENT
Start: 2025-02-11 | End: 2025-02-14

## 2025-02-11 RX ORDER — ONDANSETRON 2 MG/ML
INJECTION INTRAMUSCULAR; INTRAVENOUS AS NEEDED
Status: DISCONTINUED | OUTPATIENT
Start: 2025-02-11 | End: 2025-02-11 | Stop reason: SURG

## 2025-02-11 RX ORDER — HYDROMORPHONE HYDROCHLORIDE 1 MG/ML
0.2 INJECTION, SOLUTION INTRAMUSCULAR; INTRAVENOUS; SUBCUTANEOUS EVERY 5 MIN PRN
Status: DISCONTINUED | OUTPATIENT
Start: 2025-02-11 | End: 2025-02-11 | Stop reason: HOSPADM

## 2025-02-11 RX ORDER — NALOXONE HYDROCHLORIDE 0.4 MG/ML
0.08 INJECTION, SOLUTION INTRAMUSCULAR; INTRAVENOUS; SUBCUTANEOUS AS NEEDED
Status: DISCONTINUED | OUTPATIENT
Start: 2025-02-11 | End: 2025-02-11 | Stop reason: HOSPADM

## 2025-02-11 RX ORDER — SODIUM CHLORIDE, SODIUM LACTATE, POTASSIUM CHLORIDE, CALCIUM CHLORIDE 600; 310; 30; 20 MG/100ML; MG/100ML; MG/100ML; MG/100ML
INJECTION, SOLUTION INTRAVENOUS CONTINUOUS
Status: DISCONTINUED | OUTPATIENT
Start: 2025-02-11 | End: 2025-02-11 | Stop reason: HOSPADM

## 2025-02-11 RX ORDER — MORPHINE SULFATE 4 MG/ML
4 INJECTION, SOLUTION INTRAMUSCULAR; INTRAVENOUS EVERY 10 MIN PRN
Status: DISCONTINUED | OUTPATIENT
Start: 2025-02-11 | End: 2025-02-11 | Stop reason: HOSPADM

## 2025-02-11 RX ORDER — METOCLOPRAMIDE HYDROCHLORIDE 5 MG/ML
10 INJECTION INTRAMUSCULAR; INTRAVENOUS EVERY 8 HOURS PRN
Status: DISCONTINUED | OUTPATIENT
Start: 2025-02-11 | End: 2025-02-14

## 2025-02-11 RX ORDER — ACETAMINOPHEN 325 MG/1
650 TABLET ORAL
Status: DISCONTINUED | OUTPATIENT
Start: 2025-02-11 | End: 2025-02-14

## 2025-02-11 RX ORDER — HYDROMORPHONE HYDROCHLORIDE 1 MG/ML
0.4 INJECTION, SOLUTION INTRAMUSCULAR; INTRAVENOUS; SUBCUTANEOUS EVERY 5 MIN PRN
Status: DISCONTINUED | OUTPATIENT
Start: 2025-02-11 | End: 2025-02-11 | Stop reason: HOSPADM

## 2025-02-11 RX ADMIN — ONDANSETRON 4 MG: 2 INJECTION INTRAMUSCULAR; INTRAVENOUS at 14:44:00

## 2025-02-11 RX ADMIN — DEXAMETHASONE SODIUM PHOSPHATE 4 MG: 4 MG/ML VIAL (ML) INJECTION at 14:44:00

## 2025-02-11 NOTE — ANESTHESIA PREPROCEDURE EVALUATION
Anesthesia PreOp Note    HPI:     Toño Cagle Jr. is a 79 year old male who presents for preoperative consultation requested by: Gee Castaneda MD    Date of Surgery: 2/11/2025    Procedure(s):  Right quadriceps tendon repair  Indication: Torn right tendon, quadricep    Relevant Problems   No relevant active problems       NPO:                         History Review:  Patient Active Problem List    Diagnosis Date Noted    Vitamin D deficiency 04/27/2023    Age-related osteoporosis without current pathological fracture 04/27/2023    Aortic atherosclerosis 02/17/2023    Right foot pain 02/17/2023    Primary osteoarthritis of left knee 04/07/2021    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    Ulcerative colitis (HCC) 10/17/2019    Glaucoma        Past Medical History:    Back problem    Colitis    Deep vein thrombosis (HCC)    2014 L lower leg    Glaucoma    History of stomach ulcers    Visual impairment    Glasses/contacts       Past Surgical History:   Procedure Laterality Date    Colonoscopy N/A 12/5/2016    Procedure: COLONOSCOPY;  Surgeon: Benito Ahuja MD;  Location: [facility] ENDOSCOPY    Colonoscopy N/A 8/1/2019    Procedure: COLONOSCOPY;  Surgeon: Benito Ahuja MD;  Location: [facility] ENDOSCOPY    Colonoscopy  12/17/2019         Hernia surgery         Prescriptions Prior to Admission[1]  Current Medications and Prescriptions Ordered in Epic[2]    Allergies[3]    Family History   Problem Relation Age of Onset    High Blood Pressure Mother     Other (Other) Mother         Abd aneurysm    Cancer Father         colon     Breast Cancer Sister         Left Breast removed     Social History     Socioeconomic History    Marital status: Single   Tobacco Use    Smoking status: Never    Smokeless tobacco: Never   Vaping Use    Vaping status: Never Used   Substance and Sexual Activity    Alcohol use: Not Currently    Drug use: Never    Sexual activity: Yes     Partners: Male   Other Topics Concern    Caffeine  Concern Yes     Comment: soda - 2 cans per day    Exercise Yes     Comment: active lifestyle       Available pre-op labs reviewed.  Lab Results   Component Value Date    WBC 8.1 02/03/2025    RBC 4.86 02/03/2025    HGB 14.6 02/03/2025    HCT 44.9 02/03/2025    MCV 92.4 02/03/2025    MCH 30.0 02/03/2025    MCHC 32.5 02/03/2025    RDW 13.4 02/03/2025    .0 02/03/2025     Lab Results   Component Value Date     02/03/2025    K 4.2 02/03/2025     02/03/2025    CO2 31.0 02/03/2025    BUN 16 02/03/2025    CREATSERUM 1.11 02/03/2025    GLU 96 02/03/2025    CA 9.4 02/03/2025     Lab Results   Component Value Date    INR 0.94 02/03/2025       Vital Signs:  Body mass index is 19.77 kg/m².   height is 1.727 m (5' 8\") and weight is 59 kg (130 lb).   Vitals:    02/06/25 1017   Weight: 59 kg (130 lb)   Height: 1.727 m (5' 8\")        Anesthesia Evaluation     Patient summary reviewed and Nursing notes reviewed    No history of anesthetic complications   Airway   Dental      Pulmonary    Cardiovascular   Exercise tolerance: good    Neuro/Psych      GI/Hepatic/Renal      Endo/Other    Abdominal                  Anesthesia Plan:   ASA:  2  Plan:   General  Airway:  LMA  Post-op Pain Management: IV analgesics  Informed Consent Plan and Risks Discussed With:  Patient  Discussed plan with:  Surgeon      I have informed Toño Cagle Jr. and/or legal guardian or family member of the nature of the anesthetic plan, benefits, risks including possible dental damage if relevant, major complications, and any alternative forms of anesthetic management.   All of the patient's questions were answered to the best of my ability. The patient desires the anesthetic management as planned.  Celine Padilla MD  2/11/2025 1:33 PM  Present on Admission:  **None**           [1]   Medications Prior to Admission   Medication Sig Dispense Refill Last Dose/Taking    denosumab 60 MG/ML Subcutaneous Solution Prefilled Syringe Inject 1 mL (60  mg total) into the skin every 6 (six) months.   Taking    mesalamine 1.2 g Oral Tab EC Take 4 tablets (4.8 g total) by mouth daily.   Taking    CALCIUM CITRATE OR Take 600 mg by mouth.   Taking    Cholecalciferol (VITAMIN D3) 2000 units Oral Tab Take 1 tablet (2,000 Units total) by mouth daily.   Taking    latanoprost 0.005 % Ophthalmic Solution Place into both eyes nightly.   Taking   [2]   Current Facility-Administered Medications Ordered in Epic   Medication Dose Route Frequency Provider Last Rate Last Admin    lactated ringers infusion   Intravenous Continuous Gee Castaneda MD        acetaminophen (Tylenol Extra Strength) tab 1,000 mg  1,000 mg Oral Once Gee Castaneda MD        ceFAZolin (Ancef) 2g in 10mL IV syringe premix  2 g Intravenous Once Gee Castaneda MD         No current [facility]-ordered outpatient medications on file.   [3]   Allergies  Allergen Reactions    Latex HIVES    Sulfa Antibiotics HIVES    Imuran [Azathioprine] OTHER (SEE COMMENTS)     Reaction/DVT L Lower leg

## 2025-02-11 NOTE — H&P
Pre-op Diagnosis: Torn right tendon, quadricep    The above referenced H&P was reviewed by Sara Ibanez PA-C on 2/11/2025, the patient was examined and no significant changes have occurred in the patient's condition since the H&P was performed.  I discussed with the patient and/or legal representative the potential benefits, risks and side effects of this procedure; the likelihood of the patient achieving goals; and potential problems that might occur during recuperation.  I discussed reasonable alternatives to the procedure, including risks, benefits and side effects related to the alternatives and risks related to not receiving this procedure.  We will proceed with procedure as planned.

## 2025-02-11 NOTE — CONSULTS
[facility]    PATIENT'S NAME: KENA DURAN JR.   ATTENDING PHYSICIAN: Gee Castaneda MD   CONSULTING PHYSICIAN: Doug Kelsey MD   PATIENT ACCOUNT#:   [id number]    LOCATION:  [facility] PACU 13 [facility] 10  MEDICAL RECORD #:   [id number]       YOB: 1945  ADMISSION DATE:       02/11/2025      CONSULT DATE:  02/11/2025    REPORT OF CONSULTATION      REASON FOR ADMISSION:  Post right quadriceps tendon repair.    HISTORY OF PRESENT ILLNESS:  Patient is a 79-year-old  male who slipped fell on ice on January 10, sustaining an injury to his right quadriceps with avulsion rupture.  He was placed in an extension immobilizer and evaluated by Orthopedic Surgery.  Scheduled today for above-mentioned procedure by Dr. Castaneda.  Postoperatively, transferred to PACU for further monitoring.       PAST MEDICAL HISTORY:  Generalized osteoarthritis, ulcerative colitis, glaucoma, osteoporosis, degenerative joint disease of lumbar spine, history of DVT and peptic ulcer disease.      PAST SURGICAL HISTORY:  Right inguinal hernia repair.     MEDICATIONS:  Please see medication reconciliation list.     ALLERGIES:  Latex and sulfa.     SOCIAL HISTORY:  No tobacco, alcohol, or drug use.  Lives with his family.      FAMILY HISTORY:  Mother had hypertension and abdominal aortic aneurysm.  Father had colon cancer.     REVIEW OF SYSTEMS:  Currently resting in bed.  No chest pain.  No shortness of breath.  Other 12-point review of systems is negative.  Right lower extremity discomfort.      PHYSICAL EXAMINATION:    GENERAL:  Alert and oriented to time, place and person.  Mild distress.   VITAL SIGNS:  Temperature 97.5, pulse 89, respiratory rate 20, blood pressure 159/74, pulse ox 99% on 2L nasal cannula oxygen.   HEENT:  Atraumatic.  Oropharynx clear.  Dry mucous membranes.  Ears and nose normal.  Eyes:  Anicteric sclerae.   NECK:  Supple.  No lymphadenopathy.  Trachea midline.  Full range of motion.    LUNGS:  Clear to auscultation bilaterally.  Normal respiratory effort.    HEART:  Regular rate and rhythm.  S1 and S2 auscultated.  No murmur.    ABDOMEN:  Soft, nondistended.  No tenderness.  Positive bowel sounds.   EXTREMITIES:  Right lower extremity cylindrical cast, extension position.   NEUROLOGIC:  Motor and sensory intact.    ASSESSMENT AND PLAN:    1.   Right quadriceps tendon rupture, status post surgical repair.  Pain control.  DVT prophylaxis.  Monitor hemodynamic status.    2.   Ulcerative colitis.  Continue home medications.  3.   Glaucoma.  Continue home medications.     Dictated By Doug Kelsey MD  d: 02/11/2025 16:20:42  t: 02/11/2025 16:28:59  Job [id number]  FB/    cc: Gee Castaneda MD

## 2025-02-11 NOTE — ANESTHESIA POSTPROCEDURE EVALUATION
Patient: Toño Cagle Jr.    Procedure Summary       Date: 02/11/25 Room / Location: [facility] MAIN OR  / [facility] MAIN OR    Anesthesia Start: 1437 Anesthesia Stop: 1544    Procedure: Right quadriceps tendon repair (Right: Knee) Diagnosis: (Torn right tendon, quadricep)    Surgeons: Gee Castaneda MD Anesthesiologist: Celine Padilla MD    Anesthesia Type: general ASA Status: 2            Anesthesia Type: general    Vitals Value Taken Time   /90 02/11/25 1544   Temp 97.5 °F (36.4 °C) 02/11/25 1544   Pulse 99 02/11/25 1545   Resp 19 02/11/25 1544   SpO2 97 % 02/11/25 1545   Vitals shown include unfiled device data.    [facility] AN Post Evaluation:   Patient Evaluated in PACU  Patient Participation: complete - patient participated  Level of Consciousness: awake  Pain Score: 0  Pain Management: adequate  Airway Patency:patent  Dental exam unchanged from preop  Yes    Cardiovascular Status: acceptable  Respiratory Status: acceptable  Postoperative Hydration acceptable      Celine Padilla MD  2/11/2025 3:45 PM

## 2025-02-11 NOTE — H&P
Pre-op Diagnosis: Torn right tendon, quadricep    The above referenced H&P was reviewed by Gee Castaneda MD on 2/10/2025, the patient was examined and no significant changes have occurred in the patient's condition since the H&P was performed.  I discussed with the patient and/or legal representative the potential benefits, risks and side effects of this procedure; the likelihood of the patient achieving goals; and potential problems that might occur during recuperation.  I discussed reasonable alternatives to the procedure, including risks, benefits and side effects related to the alternatives and risks related to not receiving this procedure.  We will proceed with procedure as planned.

## 2025-02-11 NOTE — ANESTHESIA PROCEDURE NOTES
Airway  Date/Time: 2/11/2025 2:41 PM  Urgency: Elective    Airway not difficult    General Information and Staff    Patient location during procedure: OR  Anesthesiologist: Celine Padilla MD  Performed: anesthesiologist   Performed by: Celine Padilla MD  Authorized by: Celine Padilla MD      Indications and Patient Condition  Indications for airway management: anesthesia  Sedation level: deep  Preoxygenated: yes  Patient position: sniffing  Mask difficulty assessment: 1 - vent by mask    Final Airway Details  Final airway type: supraglottic airway      Successful airway: classic  Size 4       Number of attempts at approach: 1

## 2025-02-12 PROCEDURE — 99233 SBSQ HOSP IP/OBS HIGH 50: CPT | Performed by: HOSPITALIST

## 2025-02-12 RX ORDER — MESALAMINE 400 MG/1
1600 CAPSULE, DELAYED RELEASE ORAL
Status: DISCONTINUED | OUTPATIENT
Start: 2025-02-13 | End: 2025-02-14

## 2025-02-12 NOTE — PHYSICAL THERAPY NOTE
PHYSICAL THERAPY EVALUATION - INPATIENT     Room Number: 422/422-A  Evaluation Date: 2/12/2025  Type of Evaluation: Initial   Physician Order: PT Eval and Treat    Presenting Problem: Right quadriceps tendon repair on 2/11/25     Reason for Therapy: Mobility Dysfunction and Discharge Planning    PHYSICAL THERAPY ASSESSMENT   Patient is a 79 year old male admitted 2/11/2025 for R quadriceps tendon repair.  Prior to admission, patient's baseline is independent.  Patient is currently functioning below baseline with bed mobility, transfers, gait, stair negotiation, maintaining seated position, standing prolonged periods, and performing household tasks.  Patient is requiring minimal assist and moderate assist as a result of the following impairments: decreased functional strength, decreased endurance/aerobic capacity, pain, impaired sitting and standing balance, decreased muscular endurance, and limited R knee strength and ROM.  Physical Therapy will continue to follow for duration of hospitalization.    Patient will benefit from continued skilled PT Services to promote return to prior level of function and safety with continuous assistance and gradual rehabilitative therapy .    PLAN DURING HOSPITALIZATION  Nursing Mobility Recommendation : 1 Assist  PT Device Recommendation: Rolling walker;Gait belt  PT Treatment Plan: Bed mobility;Body mechanics;Endurance;Energy conservation;Patient education;Family education;Gait training;Range of motion;Strengthening;Stoop training;Stair training;Balance training;Transfer training  Rehab Potential : Good  Frequency (Obs): Daily     PHYSICAL THERAPY MEDICAL/SOCIAL HISTORY   Problem List  Principal Problem:    Traumatic rupture of right quadriceps tendon  Active Problems:    Glaucoma    Ulcerative colitis (HCC)      HOME SITUATION  Type of Home: House  Home Layout: Multi-level  Stairs to Enter : 1        Stairs to Bedroom: 9    Railing: Yes    Lives With: Alone    Drives: Yes    Patient Regularly Uses: Glasses;Rolling walker     SUBJECTIVE  \"I did a lot of research. I plan to go to rehab. I live alone\"     PHYSICAL THERAPY EXAMINATION   OBJECTIVE  Precautions: Hard of hearing;Limb alert - right (per ortho: Patient is to remain in splint. Can WB while in splint with use of walker.  TDWB'ing)  Fall Risk: Standard fall risk    WEIGHT BEARING RESTRICTION  R Lower Extremity: Touch Down Weight Bearing      PAIN ASSESSMENT  Ratin  Location: RLE  Management Techniques: Activity promotion;Body mechanics;Repositioning    COGNITION  Overall Cognitive Status:  WFL - within functional limits  Attention Span:  difficulty dividing attention and distracted with conversation, cues to redirect    RANGE OF MOTION AND STRENGTH ASSESSMENT  Upper extremity ROM and strength are within functional limits   Lower extremity ROM is impaired R knee/ankle- splint in place  Lower extremity strength is impaired RLE- unable to formally test 2* recent procedure    BALANCE  Static Sitting: Fair +  Dynamic Sitting: Fair  Static Standing: Poor +  Dynamic Standing: Poor    AM-PAC '6-Clicks' INPATIENT SHORT FORM - BASIC MOBILITY  How much difficulty does the patient currently have...  Patient Difficulty: Turning over in bed (including adjusting bedclothes, sheets and blankets)?: A Little   Patient Difficulty: Sitting down on and standing up from a chair with arms (e.g., wheelchair, bedside commode, etc.): A Lot   Patient Difficulty: Moving from lying on back to sitting on the side of the bed?: A Little   How much help from another person does the patient currently need...   Help from Another: Moving to and from a bed to a chair (including a wheelchair)?: A Little   Help from Another: Need to walk in hospital room?: A Little   Help from Another: Climbing 3-5 steps with a railing?: A Lot     AM-PAC Score:  Raw Score: 16   Approx Degree of Impairment: 54.16%   Standardized Score (AM-PAC Scale): 40.78   CMS Modifier (G-Code):  CK    FUNCTIONAL ABILITY STATUS  Functional Mobility/Gait Assessment  Gait Assistance: Minimum assistance  Distance (ft): 5ft  Assistive Device: Rolling walker  Pattern: R Decreased stance time (cues for appropriate step-to gait pattern. Cues for upright posture and safe management of RW. Compliant with TDWB'ing)  Supine to Sit: minimal assist. Task was labored requiring increased time to complete. Assist in guiding RLE toward EOB. Sat EOB for 6 minutes requiring CG/Min to maintain balance- assist with RLE elevated on supportive surface  Sit to Stand: moderate assist. Cues for appropriate UE positioning with transitional movements. Instruction on pacing upon standing to allow body time to acclimate to change in position. Tolerated 3 minutes of static standing balance with bilateral UE support on RW requiring Min A. Instruction for sliding RLE forward prior to transfer.     Exercise/Education Provided:  Bed mobility  Body mechanics  Energy conservation  Functional activity tolerated  Gait training  Posture  ROM  Strengthening  Transfer training    Skilled Therapy Provided: Patient received supine in bed. RN approved activity. Therapist educated pt on POC and physiological benefits of mobilization post operatively. Reviewed post operative weight bearing status TDWB'ing and techniques to maintain compliance. Patient agreeable to participate. Primary complaint is R knee pain which he rates at 4 out of 10 per pain scale. Patient distracted with conversation requiring intermittent cues to redirect.     The patient's Approx Degree of Impairment: 54.16% has been calculated based on documentation in the [facility] '6 clicks' Inpatient Basic Mobility Short Form.  Research supports that patients with this level of impairment may benefit from rehab.  Final disposition will be made by interdisciplinary medical team.    Patient End of Session: Up in chair;Needs met;Call light within reach;RN aware of session/findings;Alarm set;Ice  applied    CURRENT GOALS  Goals to be met by: 25  Patient Goal Patient's self-stated goal is: return to PLOF   Goal #1 Patient is able to demonstrate supine - sit EOB @ level: Supervision     Goal #1   Current Status    Goal #2 Patient is able to demonstrate transfers Sit to/from Stand at assistance level: SBA with walker - rolling     Goal #2  Current Status    Goal #3 Patient is able to ambulate 50 feet with assist device: walker - rolling at assistance level: SBA while maintaining compliance toward TDWB'ing status 100% of time   Goal #3   Current Status    Goal #4 Patient to demonstrate independence with home activity/exercise instructions provided to patient in preparation for discharge.   Goal #4   Current Status      Patient Evaluation Complexity Level:  History Moderate - 1 or 2 personal factors and/or co-morbidities   Examination of body systems Moderate - addressing a total of 3 or more elements   Clinical Presentation  Moderate - Evolving   Clinical Decision Making  Moderate Complexity     Gait Trainin minutes  Therapeutic Activity:  23 minutes

## 2025-02-12 NOTE — PROGRESS NOTES
[facility]  part of [facility]    Progress Note    Toño Cagle . Patient Status:  Outpatient in a Bed    1945 MRN [id number]   Location [facility] 4W/SW/SE Attending Yang Denton MD   Hosp Day # 0 PCP Elías Underwood MD       Subjective:     Pain controlled.  Wants rehab on dc.    Objective:   Blood pressure 111/56, pulse 73, temperature 98.4 °F (36.9 °C), temperature source Oral, resp. rate 18, height 68\", weight 129 lb (58.5 kg), SpO2 99%.    Gen:   NAD.  A and O x 3  CV:   RRR, no m/g/r  Pulm:   CTA bilat  Abd:   +bs, soft, NT, ND  LE:   No c/c/e.   Right leg in splint and bandage which is c/d/i  Neuro:   nonfocal    Results:     Lab Results   Component Value Date    WBC 8.1 2025    HGB 14.6 2025    HCT 44.9 2025    .0 2025    CREATSERUM 1.11 2025    BUN 16 2025     2025    K 4.2 2025     2025    CO2 31.0 2025    GLU 96 2025    CA 9.4 2025    ALB 4.5 2025    ALKPHO 73 2025    BILT 0.4 2025    TP 6.9 2025    AST 18 2025    ALT 13 2025    PTT 24.9 2025    INR 0.94 2025    TSH 1.790 2023    ESRML 14 2023    PHOS 2.9 2023    B12 677 2021       No results found.        Assessment and Plan:     Right quadriceps tendon rupture  POD #1 s/p surgical repair of right quad tendon  - pain control  - mobilize, PT/OT  - SCDs  - dvt proph per orthopedics  - dc planning for rehab    Hx of UC  - cont home meds    Hx of glaucoma.  Continue home medications.     dvt proph:    SCDs    Code status:    Full       MDM:    High Yang Moore MD  2025

## 2025-02-12 NOTE — PLAN OF CARE
Patient has safety precautions in place bed in the lowest position, bed alarm on, and call light within reach. Plan of care ongoing. No further concerns as of present.    Problem: Patient Centered Care  Goal: Patient preferences are identified and integrated in the patient's plan of care  Description: Interventions:    - Provide timely, complete, and accurate information to patient/family  - Incorporate patient and family knowledge, values, beliefs, and cultural backgrounds into the planning and delivery of care  - Encourage patient/family to participate in care and decision-making at the level they choose  - Honor patient and family perspectives and choices  Outcome: Progressing     Problem: PAIN - ADULT  Goal: Verbalizes/displays adequate comfort level or patient's stated pain goal  Description: INTERVENTIONS:  - Encourage pt to monitor pain and request assistance  - Assess pain using appropriate pain scale  - Administer analgesics based on type and severity of pain and evaluate response  - Implement non-pharmacological measures as appropriate and evaluate response  - Consider cultural and social influences on pain and pain management  - Manage/alleviate anxiety  - Utilize distraction and/or relaxation techniques  - Monitor for opioid side effects  - Notify MD/LIP if interventions unsuccessful or patient reports new pain  - Anticipate increased pain with activity and pre-medicate as appropriate  Outcome: Progressing     Problem: RISK FOR INFECTION - ADULT  Goal: Absence of fever/infection during anticipated neutropenic period  Description: INTERVENTIONS  - Monitor WBC  - Administer growth factors as ordered  - Implement neutropenic guidelines  Outcome: Progressing     Problem: SAFETY ADULT - FALL  Goal: Free from fall injury  Description: INTERVENTIONS:  - Assess pt frequently for physical needs  - Identify cognitive and physical deficits and behaviors that affect risk of falls.  - [place] fall precautions as  indicated by assessment.  - Educate pt/family on patient safety including physical limitations  - Instruct pt to call for assistance with activity based on assessment  - Modify environment to reduce risk of injury  - Provide assistive devices as appropriate  - Consider OT/PT consult to assist with strengthening/mobility  - Encourage toileting schedule  Outcome: Progressing     Problem: DISCHARGE PLANNING  Goal: Discharge to home or other facility with appropriate resources  Description: INTERVENTIONS:  - Identify barriers to discharge w/pt and caregiver  - Include patient/family/discharge partner in discharge planning  - Arrange for needed discharge resources and transportation as appropriate  - Identify discharge learning needs (meds, wound care, etc)  - Arrange for interpreters to assist at discharge as needed  - Consider post-discharge preferences of patient/family/discharge partner  - Complete POLST form as appropriate  - Assess patient's ability to be responsible for managing their own health  - Refer to Case Management Department for coordinating discharge planning if the patient needs post-hospital services based on physician/LIP order or complex needs related to functional status, cognitive ability or social support system  Outcome: Progressing

## 2025-02-13 LAB
ANION GAP SERPL CALC-SCNC: 7 MMOL/L (ref 0–18)
BASOPHILS # BLD AUTO: 0.04 X10(3) UL (ref 0–0.2)
BASOPHILS NFR BLD AUTO: 0.4 %
BILIRUB UR QL: NEGATIVE
BUN BLD-MCNC: 13 MG/DL (ref 9–23)
BUN/CREAT SERPL: 14.3 (ref 10–20)
CALCIUM BLD-MCNC: 8.1 MG/DL (ref 8.7–10.4)
CHLORIDE SERPL-SCNC: 105 MMOL/L (ref 98–112)
CLARITY UR: CLEAR
CO2 SERPL-SCNC: 29 MMOL/L (ref 21–32)
COLOR UR: COLORLESS
CREAT BLD-MCNC: 0.91 MG/DL
DEPRECATED RDW RBC AUTO: 45.6 FL (ref 35.1–46.3)
EGFRCR SERPLBLD CKD-EPI 2021: 87 ML/MIN/1.73M2 (ref 60–?)
EOSINOPHIL # BLD AUTO: 0.27 X10(3) UL (ref 0–0.7)
EOSINOPHIL NFR BLD AUTO: 3 %
ERYTHROCYTE [DISTWIDTH] IN BLOOD BY AUTOMATED COUNT: 13.5 % (ref 11–15)
GLUCOSE BLD-MCNC: 91 MG/DL (ref 70–99)
GLUCOSE UR-MCNC: NORMAL MG/DL
HCT VFR BLD AUTO: 38.1 %
HGB BLD-MCNC: 12.7 G/DL
IMM GRANULOCYTES # BLD AUTO: 0.04 X10(3) UL (ref 0–1)
IMM GRANULOCYTES NFR BLD: 0.4 %
LEUKOCYTE ESTERASE UR QL STRIP.AUTO: 25
LYMPHOCYTES # BLD AUTO: 2.26 X10(3) UL (ref 1–4)
LYMPHOCYTES NFR BLD AUTO: 25.1 %
MCH RBC QN AUTO: 30.5 PG (ref 26–34)
MCHC RBC AUTO-ENTMCNC: 33.3 G/DL (ref 31–37)
MCV RBC AUTO: 91.6 FL
MONOCYTES # BLD AUTO: 1.23 X10(3) UL (ref 0.1–1)
MONOCYTES NFR BLD AUTO: 13.7 %
NEUTROPHILS # BLD AUTO: 5.16 X10 (3) UL (ref 1.5–7.7)
NEUTROPHILS # BLD AUTO: 5.16 X10(3) UL (ref 1.5–7.7)
NEUTROPHILS NFR BLD AUTO: 57.4 %
NITRITE UR QL STRIP.AUTO: NEGATIVE
OSMOLALITY SERPL CALC.SUM OF ELEC: 292 MOSM/KG (ref 275–295)
PH UR: 5.5 [PH] (ref 5–8)
PLATELET # BLD AUTO: 173 10(3)UL (ref 150–450)
POTASSIUM SERPL-SCNC: 4 MMOL/L (ref 3.5–5.1)
PROT UR-MCNC: NEGATIVE MG/DL
RBC # BLD AUTO: 4.16 X10(6)UL
SODIUM SERPL-SCNC: 141 MMOL/L (ref 136–145)
SP GR UR STRIP: 1.01 (ref 1–1.03)
UROBILINOGEN UR STRIP-ACNC: NORMAL
WBC # BLD AUTO: 9 X10(3) UL (ref 4–11)

## 2025-02-13 PROCEDURE — 99233 SBSQ HOSP IP/OBS HIGH 50: CPT | Performed by: HOSPITALIST

## 2025-02-13 RX ORDER — HYDROCODONE BITARTRATE AND ACETAMINOPHEN 5; 325 MG/1; MG/1
1-2 TABLET ORAL EVERY 6 HOURS PRN
Qty: 30 TABLET | Refills: 0 | Status: SHIPPED | OUTPATIENT
Start: 2025-02-13

## 2025-02-13 RX ORDER — ASPIRIN 81 MG/1
81 TABLET ORAL 2 TIMES DAILY
Status: SHIPPED | COMMUNITY
Start: 2025-02-13

## 2025-02-13 RX ORDER — ASPIRIN 81 MG/1
81 TABLET ORAL 2 TIMES DAILY
Status: DISCONTINUED | OUTPATIENT
Start: 2025-02-13 | End: 2025-02-14

## 2025-02-13 NOTE — PLAN OF CARE
Pt is POD #1-2. Vital signs within normal limits. Scheduled Tramadol and Motrin provided for pain, refusing Tylenol. Alert and oriented x4. CMS intact. On room air. Tolerating diet. Voids freely. Dressing clean, dry, and intact. Up with 1-2 assist and the walker. TTWB. SCDs for DVT prophylaxis. Appropriate safety precautions maintained. Bed locked in lowest position, call light within reach, and frequent rounding maintained. Plan for [facility] pending choice, ins auth, OT eval.    Problem: Patient Centered Care  Goal: Patient preferences are identified and integrated in the patient's plan of care  Description: Interventions:  - What would you like us to know as we care for you?   - Provide timely, complete, and accurate information to patient/family  - Incorporate patient and family knowledge, values, beliefs, and cultural backgrounds into the planning and delivery of care  - Encourage patient/family to participate in care and decision-making at the level they choose  - Honor patient and family perspectives and choices  Outcome: Progressing     Problem: PAIN - ADULT  Goal: Verbalizes/displays adequate comfort level or patient's stated pain goal  Description: INTERVENTIONS:  - Encourage pt to monitor pain and request assistance  - Assess pain using appropriate pain scale  - Administer analgesics based on type and severity of pain and evaluate response  - Implement non-pharmacological measures as appropriate and evaluate response  - Consider cultural and social influences on pain and pain management  - Manage/alleviate anxiety  - Utilize distraction and/or relaxation techniques  - Monitor for opioid side effects  - Notify MD/LIP if interventions unsuccessful or patient reports new pain  - Anticipate increased pain with activity and pre-medicate as appropriate  Outcome: Progressing     Problem: RISK FOR INFECTION - ADULT  Goal: Absence of fever/infection during anticipated neutropenic period  Description: INTERVENTIONS  -  Monitor WBC  - Administer growth factors as ordered  - Implement neutropenic guidelines  Outcome: Progressing     Problem: SAFETY ADULT - FALL  Goal: Free from fall injury  Description: INTERVENTIONS:  - Assess pt frequently for physical needs  - Identify cognitive and physical deficits and behaviors that affect risk of falls.  - [place] fall precautions as indicated by assessment.  - Educate pt/family on patient safety including physical limitations  - Instruct pt to call for assistance with activity based on assessment  - Modify environment to reduce risk of injury  - Provide assistive devices as appropriate  - Consider OT/PT consult to assist with strengthening/mobility  - Encourage toileting schedule  Outcome: Progressing     Problem: DISCHARGE PLANNING  Goal: Discharge to home or other facility with appropriate resources  Description: INTERVENTIONS:  - Identify barriers to discharge w/pt and caregiver  - Include patient/family/discharge partner in discharge planning  - Arrange for needed discharge resources and transportation as appropriate  - Identify discharge learning needs (meds, wound care, etc)  - Arrange for interpreters to assist at discharge as needed  - Consider post-discharge preferences of patient/family/discharge partner  - Complete POLST form as appropriate  - Assess patient's ability to be responsible for managing their own health  - Refer to Case Management Department for coordinating discharge planning if the patient needs post-hospital services based on physician/LIP order or complex needs related to functional status, cognitive ability or social support system  Outcome: Progressing

## 2025-02-13 NOTE — CM/SW NOTE
SW followed up on DC planning.     SW followed up on accepting SNF facilities.     Pt first choice 1. [facility] in [place] accepted - SW notified them to start auth     UPDATE:     [facility] confirms that they have insurance auth      PLAN: DC to [facility] pending med matheus GALLEGOS LSW, MSW ext. [phone]

## 2025-02-13 NOTE — OCCUPATIONAL THERAPY NOTE
OCCUPATIONAL THERAPY EVALUATION - INPATIENT     Room Number: 422/422-A  Evaluation Date: 2/13/2025  Type of Evaluation: Initial   Presenting Problem: s/p right quadriceps tendon repair   Co-Morbidities: Generalized osteoarthritis, ulcerative colitis, glaucoma, osteoporosis, degenerative joint disease of lumbar spine, history of DVT and peptic ulcer disease     Physician Order: IP Consult to Occupational Therapy  Reason for Therapy: ADL/IADL Dysfunction and Discharge Planning    OCCUPATIONAL THERAPY ASSESSMENT   Patient is a 79 year old male admitted 2/11/2025 for s/p R quadriceps tendon repair.  Prior to admission, patient's baseline is independent with all ADLs.  Patient is currently functioning below baseline with toileting, lower body dressing, bed mobility, transfers, static standing balance, dynamic standing balance, and functional standing tolerance.  Patient is requiring stand-by assist, contact guard assist, minimal assist, and moderate assist as a result of the following impairments: decreased functional strength, decreased endurance, impaired standing balance, decreased muscular endurance, medical status, and limited RLE ROM. Occupational Therapy will continue to follow for duration of hospitalization.    Patient will benefit from continued skilled OT Services to promote return to prior level of function and safety with continuous assistance and gradual rehabilitative therapy.    PLAN DURING HOSPITALIZATION  OT Device Recommendations: TBD  OT Treatment Plan: ADL training;Functional transfer training;Patient/Family education;Patient/Family training;Equipment eval/education;Compensatory technique education     OCCUPATIONAL THERAPY MEDICAL/SOCIAL HISTORY   Problem List  Principal Problem:    Traumatic rupture of right quadriceps tendon  Active Problems:    Glaucoma    Ulcerative colitis (HCC)    HOME SITUATION  Type of Home: House  Home Layout: Multi-level  Lives With: Alone  Drives: Yes  Patient Regularly  Uses: Glasses; Rolling walker    Prior Level of [place]: Prior to admission pt was independent with ADLs.     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 Can WB while in splint with use of walker.  TDWB'ing)  Fall Risk: Standard fall risk      PAIN ASSESSMENT  Ratin    COGNITION  Overall Cognitive Status:  WFL - within functional limits    RANGE OF MOTION   Upper extremity ROM is within functional limits     STRENGTH ASSESSMENT  Upper extremity strength is within functional limits     COORDINATION  Gross Motor: WFL   Fine Motor: WFL     ACTIVITIES OF DAILY LIVING ASSESSMENT  AM-PAC ‘6-Clicks’ Inpatient Daily Activity Short Form  How much help from another person does the patient currently need…  -   Putting on and taking off regular lower body clothing?: A Lot  -   Bathing (including washing, rinsing, drying)?: A Lot  -   Toileting, which includes using toilet, bedpan or urinal? : A Little  -   Putting on and taking off regular upper body clothing?: A Little  -   Taking care of personal grooming such as brushing teeth?: None  -   Eating meals?: None    AM-PAC Score:  Score: 18  Approx Degree of Impairment: 46.65%  Standardized Score (AM-PAC Scale): 38.66  CMS Modifier (G-Code): CK    FUNCTIONAL TRANSFER ASSESSMENT  Sit to Stand: Edge of Bed; Chair  Edge of Bed: Minimal Assist  Chair: Moderate Assist  Simulated toilet transfer: Mod assist at RW level     FUNCTIONAL MOBILITY  Pt required CGA with RW support to complete room distance functional mobility to simulate household distances.  Comments: Pt benefited from verbal cues for sequencing of LLE and RW during task to maintain TDWB status.     BED MOBILITY  Supine to Sit : Minimal Assist  Comments: Pt required assist to manage RLE during transition.     BALANCE ASSESSMENT  Static Sitting: Stand-by Assist  Static Standing:  Contact Guard Assist  Dynamic sitting: SBA  Dynamic Standing: CGA     FUNCTIONAL ADL ASSESSMENT  Eating: Independent  Grooming Seated: Independent  LB Dressing Seated: Maximum Assist    Skilled Therapy Provided:   RN cleared pt for participation. Session coordinated with PT. Pt received in bed with no visitors present. Pt agreeable to participation in therapy. To assess pt's participation during tasks while also providing intermittent education to maximize participation, OT facilitated the following: bed mobility, functional transfers, functional mobility, and ADL tasks. Pt tolerated treatment well and completed tasks with assist levels listed above. Pt benefited from cues for sequencing, hand placement, and body mechanics during tasks to maximize participation and safety. Pt demo good carry-over of ed and good safety awareness, requiring no additional cues to maintain TDWB status during activity. Pt remained in recliner with all needs in reach and alarm on at end of session. RN aware.         EDUCATION PROVIDED  Patient Education : Role of Occupational Therapy; Plan of Care; Functional Transfer Techniques; Fall Prevention; Weight Bear Status; Posture/Positioning; Proper Body Mechanics  Patient's Response to Education: Verbalized Understanding; Returned Demonstration    The patient's Approx Degree of Impairment: 46.65% has been calculated based on documentation in the [facility] '6 clicks' Inpatient Daily Activity Short Form.  Research supports that patients with this level of impairment may benefit from HHOT but anticipate pt would benefit from gradual rehab to maximize return to PLOF.  Final disposition will be made by interdisciplinary medical team.     Patient End of Session: Up in chair;Needs met;Call light within reach;RN aware of session/findings;All patient questions and concerns addressed;Hospital anti-slip socks;Alarm set    OT Goals  Patients self stated goal is: none stated      Patient will complete  functional transfer with SUP  Comment:     Patient will complete toileting with SUP  Comment:     Patient will tolerate standing for 5 minutes in prep for adls with SUP   Comment:    Patient will complete LB dressing mod I   Comment:          Goals  on: 25  Frequency: 3x/week    Patient Evaluation Complexity Level:   Occupational Profile/Medical History MODERATE - Expanded review of history including review of medical or therapy record   Specific performance deficits impacting engagement in ADL/IADL MODERATE  3 - 5 performance deficits   Client Assessment/Performance Deficits MODERATE - Comorbidities and min to mod modifications of tasks    Clinical Decision Making MODERATE - Analysis of occupational profile, detailed assessments, several treatment options    Overall Complexity MODERATE     OT Session Time: 23 minutes  Self-Care Home Management: 8 minutes  Therapeutic Activity: 15 minutes

## 2025-02-13 NOTE — CONGREGATE LIVING REVIEW
[facility] Living Authorization    The [facility] Review Committee has reviewed this case and the patient IS APPROVED for discharge to a facility for Short Term Skilled once the following procedure is followed:     - The physician discharge instructions (contained within the BARRY note for SNF) must inlcude the below appropriate and approved COVID instructions to the facility    For questions regarding CLRC approval process, please contact the CM assigned to the case.  For questions regarding RN discharge workflow, please contact the unit Clinical Leader.

## 2025-02-13 NOTE — PLAN OF CARE
Patient is A/Ox4. On RA. Tolerating diet. Voiding via urinal. Up 2x w walker. TTWB to R leg. Scheduled tramadol and ibuprofen given for pain, refusing tylenol. Call light and personal items within reach. Plan for discharge to [facility] pending ins auth.       Problem: PAIN - ADULT  Goal: Verbalizes/displays adequate comfort level or patient's stated pain goal  Description: INTERVENTIONS:  - Encourage pt to monitor pain and request assistance  - Assess pain using appropriate pain scale  - Administer analgesics based on type and severity of pain and evaluate response  - Implement non-pharmacological measures as appropriate and evaluate response  - Consider cultural and social influences on pain and pain management  - Manage/alleviate anxiety  - Utilize distraction and/or relaxation techniques  - Monitor for opioid side effects  - Notify MD/LIP if interventions unsuccessful or patient reports new pain  - Anticipate increased pain with activity and pre-medicate as appropriate  Outcome: Progressing     Problem: RISK FOR INFECTION - ADULT  Goal: Absence of fever/infection during anticipated neutropenic period  Description: INTERVENTIONS  - Monitor WBC  - Administer growth factors as ordered  - Implement neutropenic guidelines  Outcome: Progressing     Problem: SAFETY ADULT - FALL  Goal: Free from fall injury  Description: INTERVENTIONS:  - Assess pt frequently for physical needs  - Identify cognitive and physical deficits and behaviors that affect risk of falls.  - [place] fall precautions as indicated by assessment.  - Educate pt/family on patient safety including physical limitations  - Instruct pt to call for assistance with activity based on assessment  - Modify environment to reduce risk of injury  - Provide assistive devices as appropriate  - Consider OT/PT consult to assist with strengthening/mobility  - Encourage toileting schedule  Outcome: Progressing     Problem: DISCHARGE PLANNING  Goal: Discharge to home or other  facility with appropriate resources  Description: INTERVENTIONS:  - Identify barriers to discharge w/pt and caregiver  - Include patient/family/discharge partner in discharge planning  - Arrange for needed discharge resources and transportation as appropriate  - Identify discharge learning needs (meds, wound care, etc)  - Arrange for interpreters to assist at discharge as needed  - Consider post-discharge preferences of patient/family/discharge partner  - Complete POLST form as appropriate  - Assess patient's ability to be responsible for managing their own health  - Refer to Case Management Department for coordinating discharge planning if the patient needs post-hospital services based on physician/LIP order or complex needs related to functional status, cognitive ability or social support system  Outcome: Progressing

## 2025-02-13 NOTE — PLAN OF CARE
Toño is A&Ox4 on room air. POD 1. Splint in place. Ambulating x1 with RW. Toe touch WB on right leg. Leg elevated with pillows. Pain is being controlled with PRN pain pills. Plan is for rehab.  Problem: Patient Centered Care  Goal: Patient preferences are identified and integrated in the patient's plan of care  Description: Interventions:  - What would you like us to know as we care for you?   - Provide timely, complete, and accurate information to patient/family  - Incorporate patient and family knowledge, values, beliefs, and cultural backgrounds into the planning and delivery of care  - Encourage patient/family to participate in care and decision-making at the level they choose  - Honor patient and family perspectives and choices  Outcome: Progressing     Problem: PAIN - ADULT  Goal: Verbalizes/displays adequate comfort level or patient's stated pain goal  Description: INTERVENTIONS:  - Encourage pt to monitor pain and request assistance  - Assess pain using appropriate pain scale  - Administer analgesics based on type and severity of pain and evaluate response  - Implement non-pharmacological measures as appropriate and evaluate response  - Consider cultural and social influences on pain and pain management  - Manage/alleviate anxiety  - Utilize distraction and/or relaxation techniques  - Monitor for opioid side effects  - Notify MD/LIP if interventions unsuccessful or patient reports new pain  - Anticipate increased pain with activity and pre-medicate as appropriate  Outcome: Progressing     Problem: RISK FOR INFECTION - ADULT  Goal: Absence of fever/infection during anticipated neutropenic period  Description: INTERVENTIONS  - Monitor WBC  - Administer growth factors as ordered  - Implement neutropenic guidelines  Outcome: Progressing     Problem: SAFETY ADULT - FALL  Goal: Free from fall injury  Description: INTERVENTIONS:  - Assess pt frequently for physical needs  - Identify cognitive and physical  deficits and behaviors that affect risk of falls.  - [place] fall precautions as indicated by assessment.  - Educate pt/family on patient safety including physical limitations  - Instruct pt to call for assistance with activity based on assessment  - Modify environment to reduce risk of injury  - Provide assistive devices as appropriate  - Consider OT/PT consult to assist with strengthening/mobility  - Encourage toileting schedule  Outcome: Progressing

## 2025-02-13 NOTE — PROGRESS NOTES
[facility]  part of [facility]    Progress Note    Toño Cagle . Patient Status:  Outpatient in a Bed    1945 MRN [id number]   Location [facility] 4W/SW/SE Attending Yang Denton MD   Hosp Day # 0 PCP Elías Underwood MD       Subjective:     Pain controlled.  Working with OT.    Objective:   Blood pressure 118/66, pulse 73, temperature 98.3 °F (36.8 °C), temperature source Oral, resp. rate 18, height 68\", weight 129 lb (58.5 kg), SpO2 94%.    Gen:   NAD.  A and O x 3  CV:   RRR, no m/g/r  Pulm:   CTA bilat  Abd:   +bs, soft, NT, ND  LE:   No c/c/e.   Right leg in splint and bandage which is c/d/i  Neuro:   nonfocal    Results:     Lab Results   Component Value Date    WBC 9.0 2025    HGB 12.7 (L) 2025    HCT 38.1 (L) 2025    .0 2025    CREATSERUM 0.91 2025    BUN 13 2025     2025    K 4.0 2025     2025    CO2 29.0 2025    GLU 91 2025    CA 8.1 (L) 2025    ALB 4.5 2025    ALKPHO 73 2025    BILT 0.4 2025    TP 6.9 2025    AST 18 2025    ALT 13 2025    PTT 24.9 2025    INR 0.94 2025    TSH 1.790 2023    ESRML 14 2023    PHOS 2.9 2023    B12 677 2021       No results found.        Assessment and Plan:     Right quadriceps tendon rupture  POD #2 s/p surgical repair of right quad tendon  - pain control  - mobilize, PT/OT  - SCDs  - aspirin BID for dvt proph  - dc planning for rehab     Hx of UC  - cont home meds     Hx of glaucoma.  Continue home medications.      dvt proph:    aspirin BID     Code status:    Full       MDM:    High Yang Moore MD  2025

## 2025-02-13 NOTE — PHYSICAL THERAPY NOTE
PHYSICAL THERAPY TREATMENT NOTE - INPATIENT     Room Number: 422/422-A       Presenting Problem: Right quadriceps tendon repair on 2/11/25       Problem List  Principal Problem:    Traumatic rupture of right quadriceps tendon  Active Problems:    Glaucoma    Ulcerative colitis (HCC)      PHYSICAL THERAPY ASSESSMENT   Patient demonstrates good  progress this session, goals  remain in progress.      Patient is requiring contact guard assist, minimal assist, and moderate assist as a result of the following impairments: decreased functional strength, pain, impaired standing dynamic balance, limited RLE ROM, and RLE TTWB .     Patient continues to function below baseline with bed mobility, transfers, gait, stair negotiation, standing prolonged periods, and performing household tasks.  Next session anticipate patient to progress bed mobility, transfers, and gait.  Physical Therapy will continue to follow patient for duration of hospitalization.    Patient continues to benefit from continued skilled PT services: to promote return to prior level of function and safety with continuous assistance and gradual rehabilitative therapy .    PLAN DURING HOSPITALIZATION  Nursing Mobility Recommendation : 1 Assist  PT Device Recommendation: Rolling walker;Gait belt  PT Treatment Plan: Bed mobility;Body mechanics;Endurance;Energy conservation;Patient education;Family education;Gait training;Range of motion;Strengthening;Stoop training;Stair training;Balance training;Transfer training  Frequency (Obs): Daily     SUBJECTIVE  Agreeable to activity    OBJECTIVE  Precautions: Hard of hearing;Limb alert - right (per ortho: Patient is to remain in splint. Can WB while in splint with use of walker.  TDWB'ing)    WEIGHT BEARING RESTRICTION  R Lower Extremity: Touch Down Weight Bearing      PAIN ASSESSMENT   Rating: 3  Location: RLE  Management Techniques: Activity promotion;Body mechanics    BALANCE  Static Sitting: Fair +  Dynamic Sitting:  Fair  Static Standing: Fair -  Dynamic Standing: Fair -      AM-PAC '6-Clicks' INPATIENT SHORT FORM - BASIC MOBILITY  How much difficulty does the patient currently have...  Patient Difficulty: Turning over in bed (including adjusting bedclothes, sheets and blankets)?: A Little   Patient Difficulty: Sitting down on and standing up from a chair with arms (e.g., wheelchair, bedside commode, etc.): A Lot   Patient Difficulty: Moving from lying on back to sitting on the side of the bed?: A Little   How much help from another person does the patient currently need...   Help from Another: Moving to and from a bed to a chair (including a wheelchair)?: A Little   Help from Another: Need to walk in hospital room?: A Little   Help from Another: Climbing 3-5 steps with a railing?: A Lot     AM-PAC Score:  Raw Score: 16   Approx Degree of Impairment: 54.16%   Standardized Score (AM-PAC Scale): 40.78   CMS Modifier (G-Code): CK    FUNCTIONAL ABILITY STATUS  Functional Mobility/Gait Assessment  Gait Assistance: Contact guard assist  Distance (ft): 3 ft + 15ft  Assistive Device: Rolling walker  Pattern:  (maintains RLE TTWB)  Supine to Sit: minimal assist for RLE management  Sit to Stand: minimal assist from elevated bed height, moderate assist from lower chair height, cues to advance RLE prior to sitting and standing and to reach for arm rests    Skilled Therapy Provided: Pt rec'd in bed agreeable to therapy, identified by name and , gait belt donned for mobility. Coordinated session with OT to maximize pt outcomes. Pt with improved mobility this session, able to progress transfers and ambulation distance this date. Pt maintains TTWB on RLE with transfers and ambulation. Pt required frequent VC for gait sequencing. Pt up in chair at end of session with needs in reach.       The patient's Approx Degree of Impairment: 54.16% has been calculated based on documentation in the [facility] '6 clicks' Inpatient Daily Activity Short Form.   Research supports that patients with this level of impairment may benefit from rehab.  Final disposition will be made by interdisciplinary medical team.        Patient End of Session: Up in chair;Needs met;Call light within reach;RN aware of session/findings;Alarm set    CURRENT GOALS   Goals to be met by: 2/26/25  Patient Goal Patient's self-stated goal is: return to PLOF   Goal #1 Patient is able to demonstrate supine - sit EOB @ level: Supervision      Goal #1   Current Status  in progress   Goal #2 Patient is able to demonstrate transfers Sit to/from Stand at assistance level: SBA with walker - rolling      Goal #2  Current Status  in progress   Goal #3 Patient is able to ambulate 50 feet with assist device: walker - rolling at assistance level: SBA while maintaining compliance toward TDWB'ing status 100% of time   Goal #3   Current Status  in progress   Goal #4 Patient to demonstrate independence with home activity/exercise instructions provided to patient in preparation for discharge.   Goal #4   Current Status  in progress     Gait Training: 15 minutes  Therapeutic Activity: 8 minutes

## 2025-02-13 NOTE — CM/SW NOTE
02/12/25 1800   CM/SW Referral Data   Referral Source    Reason for Referral Discharge planning   Informant Patient   Medical Hx   Does patient have an established PCP? Yes  (Elías Underwood)   Patient Info   Patient's Current Mental Status at Time of Assessment Alert;Oriented   Patient's Home Environment House   Number of Levels in Home 3   Number of Stair in Home 9 stairs to bed room   Patient lives with Alone   Patient Status Prior to Admission   Independent with ADLs and Mobility Yes   Services in place prior to admission DME/Supplies at home   Type of DME/Supplies Straight Cane;Standard Walker   Discharge Needs   Anticipated D/C needs Subacute rehab   Services Requested   Submitted to [facility] Yes   PASRR Level 1 Submitted Yes     Pt discussed during nursing rounds. Dx right quad/tendon rupture. Home alone, independent w/cane and walker prior to admission. Pt requesting [facility]DRIS at [place]. [facility] in [place] is #1 choice, BTE #2, and PP #3. Anticipated therapy need[facility] referrals sent in AIDIN. List of accepting facilities will be needed if none of the requested facilities can accept. Ins auth will be needed for IDRIS prior to dc. OT eval will needed to be uploaded once available.    PASRR level 1 screen completed and uploaded to aidin referral.    Plan: IDRIS pending CLRC, OT eval, facility choice, ins auth, and medical clearance.    / to remain available for support and/or discharge planning.     TERRELL Lloyd    [phone]

## 2025-02-13 NOTE — CONSULTS
[facility] [place]     ORTHO DAILY PROGRESS NOTE    Date: 2/12/2025    Patient: Toño Cagle Jr.      Subjective:  Nothing acute overnight.  Pain in the right knee is well controlled. Splint is dry and intact.   Patient denies new onset numbness/tingling in the operative extremity.  Patient also denies chest pain / shortness of breath, nausea/vomiting.    Right Lower Extremity:   Dressing clean, dry, intact.    Sensation intact to light touch distally. Able to move toes.   Vascular exam: Cap refill ~2s in the toes. Foot warm and well perfused      ASSESSMENT/PLAN: 79 year old yo male s/p right quadriceps tendon repair on 2/11/2025. He is doing well and states his pain is well controlled. He is well appearing.     - Weight bearing status: WBAT RLE   - Mechanical DVT prophylaxis and chemoprophylaxis with ASA 81 BID   - PT for mobilization and motion  - Advance diet as tolerated. Discontinue IV fluids POD1 if tolerating diet.   - Disposition: Waiting rehab center placement     Sara Ibanez  Physician Assistant to Dr. Gee Castaneda   [facility] [place]

## 2025-02-14 VITALS
WEIGHT: 129 LBS | DIASTOLIC BLOOD PRESSURE: 77 MMHG | RESPIRATION RATE: 18 BRPM | BODY MASS INDEX: 19.55 KG/M2 | HEIGHT: 68 IN | OXYGEN SATURATION: 97 % | HEART RATE: 85 BPM | SYSTOLIC BLOOD PRESSURE: 113 MMHG | TEMPERATURE: 98 F

## 2025-02-14 PROCEDURE — 99239 HOSP IP/OBS DSCHRG MGMT >30: CPT | Performed by: HOSPITALIST

## 2025-02-14 NOTE — PHYSICAL THERAPY NOTE
PHYSICAL THERAPY TREATMENT NOTE - INPATIENT     Room Number: 422/422-A       Presenting Problem: Right quadriceps tendon repair on 2/11/25       Problem List  Principal Problem:    Traumatic rupture of right quadriceps tendon  Active Problems:    Glaucoma    Ulcerative colitis (HCC)      PHYSICAL THERAPY ASSESSMENT   Patient demonstrates good  progress this session, goals  remain in progress.      Patient is requiring contact guard assist and minimal assist as a result of the following impairments: decreased functional strength, decreased endurance/aerobic capacity, pain, impaired dynamic standing balance, decreased muscular endurance, limited RLE ROM, and RLE TTWB status .     Patient continues to function below baseline with bed mobility, transfers, gait, stair negotiation, standing prolonged periods, and performing household tasks.  Next session anticipate patient to progress bed mobility, transfers, and gait.  Physical Therapy will continue to follow patient for duration of hospitalization.    Patient continues to benefit from continued skilled PT services: to promote return to prior level of function and safety with continuous assistance and gradual rehabilitative therapy .    PLAN DURING HOSPITALIZATION  Nursing Mobility Recommendation : 1 Assist  PT Device Recommendation: Rolling walker;Gait belt  PT Treatment Plan: Bed mobility;Body mechanics;Endurance;Energy conservation;Patient education;Family education;Gait training;Range of motion;Strengthening;Stoop training;Stair training;Balance training;Transfer training  Frequency (Obs): Daily     SUBJECTIVE  Agreeable to activity    OBJECTIVE  Precautions: Hard of hearing;Limb alert - right (per ortho: Patient is to remain in splint. Can WB while in splint with use of walker.  TDWB'ing)    WEIGHT BEARING RESTRICTION  R Lower Extremity: Touch Down Weight Bearing      PAIN ASSESSMENT   Rating: 3  Location: RLE  Management Techniques: Activity promotion;Body  mechanics    BALANCE  Static Sitting: Good  Dynamic Sitting: Fair +  Static Standing: Fair  Dynamic Standing: Fair -        AM-PAC '6-Clicks' INPATIENT SHORT FORM - BASIC MOBILITY  How much difficulty does the patient currently have...  Patient Difficulty: Turning over in bed (including adjusting bedclothes, sheets and blankets)?: A Little   Patient Difficulty: Sitting down on and standing up from a chair with arms (e.g., wheelchair, bedside commode, etc.): A Little   Patient Difficulty: Moving from lying on back to sitting on the side of the bed?: A Little   How much help from another person does the patient currently need...   Help from Another: Moving to and from a bed to a chair (including a wheelchair)?: A Little   Help from Another: Need to walk in hospital room?: A Little   Help from Another: Climbing 3-5 steps with a railing?: A Lot     AM-PAC Score:  Raw Score: 17   Approx Degree of Impairment: 50.57%   Standardized Score (AM-PAC Scale): 42.13   CMS Modifier (G-Code): CK    FUNCTIONAL ABILITY STATUS  Functional Mobility/Gait Assessment  Gait Assistance: Contact guard assist  Distance (ft): 30 ft  Assistive Device: Rolling walker  Pattern:  (maintains TTWB on RLE)  Supine to Sit: minimal assist for RLE management  Sit to Stand: contact guard assist to RW    Skilled Therapy Provided: Pt agreeable to therapy, identified by name and , gait belt donned for mobility. Pt making progress towards mobility goals including bed mobility, transfers and ambulation this date. Pt required Min A for RLE management and VC for gait sequencing with RW. Pt maintains TTWB on RLE during all mobility. Pt up in chair with needs in reach, alarm active, RN present in room.       The patient's Approx Degree of Impairment: 50.57% has been calculated based on documentation in the [facility] '6 clicks' Inpatient Daily Activity Short Form.  Research supports that patients with this level of impairment may benefit from rehab.  Final  disposition will be made by interdisciplinary medical team.      Patient End of Session: Up in chair;Needs met;Call light within reach;RN aware of session/findings;Alarm set    CURRENT GOALS   Goals to be met by: 2/26/25  Patient Goal Patient's self-stated goal is: return to PLOF   Goal #1 Patient is able to demonstrate supine - sit EOB @ level: Supervision      Goal #1   Current Status  in progress   Goal #2 Patient is able to demonstrate transfers Sit to/from Stand at assistance level: SBA with walker - rolling      Goal #2  Current Status  in progress   Goal #3 Patient is able to ambulate 50 feet with assist device: walker - rolling at assistance level: SBA while maintaining compliance toward TDWB'ing status 100% of time   Goal #3   Current Status  in progress   Goal #4 Patient to demonstrate independence with home activity/exercise instructions provided to patient in preparation for discharge.   Goal #4   Current Status  in progress     Gait Training: 15 minutes  Therapeutic Activity: 8 minutes

## 2025-02-14 NOTE — PLAN OF CARE
Pt is POD #2-3. Vital signs within gilberto limits. Scheduled Motrin and Tramadol provided for pain. Alert and oriented x4. CMS intact. On room air. Tolerating general diet. Void freely. Dressing clean, dry, and intact. Up with two assist and the walker, TTWB. Aspirin and SCDs for DVT prophylaxis. Appropriate safety precautions maintained. Bed locked in lowest position, call light within reach, and frequent rounding maintained. Plan for [facility] pending med clearance.    Problem: Patient Centered Care  Goal: Patient preferences are identified and integrated in the patient's plan of care  Description: Interventions:  - What would you like us to know as we care for you?   - Provide timely, complete, and accurate information to patient/family  - Incorporate patient and family knowledge, values, beliefs, and cultural backgrounds into the planning and delivery of care  - Encourage patient/family to participate in care and decision-making at the level they choose  - Honor patient and family perspectives and choices  Outcome: Progressing     Problem: PAIN - ADULT  Goal: Verbalizes/displays adequate comfort level or patient's stated pain goal  Description: INTERVENTIONS:  - Encourage pt to monitor pain and request assistance  - Assess pain using appropriate pain scale  - Administer analgesics based on type and severity of pain and evaluate response  - Implement non-pharmacological measures as appropriate and evaluate response  - Consider cultural and social influences on pain and pain management  - Manage/alleviate anxiety  - Utilize distraction and/or relaxation techniques  - Monitor for opioid side effects  - Notify MD/LIP if interventions unsuccessful or patient reports new pain  - Anticipate increased pain with activity and pre-medicate as appropriate  Outcome: Progressing     Problem: RISK FOR INFECTION - ADULT  Goal: Absence of fever/infection during anticipated neutropenic period  Description:  INTERVENTIONS  - Monitor WBC  - Administer growth factors as ordered  - Implement neutropenic guidelines  Outcome: Progressing     Problem: SAFETY ADULT - FALL  Goal: Free from fall injury  Description: INTERVENTIONS:  - Assess pt frequently for physical needs  - Identify cognitive and physical deficits and behaviors that affect risk of falls.  - [place] fall precautions as indicated by assessment.  - Educate pt/family on patient safety including physical limitations  - Instruct pt to call for assistance with activity based on assessment  - Modify environment to reduce risk of injury  - Provide assistive devices as appropriate  - Consider OT/PT consult to assist with strengthening/mobility  - Encourage toileting schedule  Outcome: Progressing     Problem: DISCHARGE PLANNING  Goal: Discharge to home or other facility with appropriate resources  Description: INTERVENTIONS:  - Identify barriers to discharge w/pt and caregiver  - Include patient/family/discharge partner in discharge planning  - Arrange for needed discharge resources and transportation as appropriate  - Identify discharge learning needs (meds, wound care, etc)  - Arrange for interpreters to assist at discharge as needed  - Consider post-discharge preferences of patient/family/discharge partner  - Complete POLST form as appropriate  - Assess patient's ability to be responsible for managing their own health  - Refer to Case Management Department for coordinating discharge planning if the patient needs post-hospital services based on physician/LIP order or complex needs related to functional status, cognitive ability or social support system  Outcome: Progressing

## 2025-02-14 NOTE — CM/SW NOTE
ANNIE followed up on DC planning.     SW received confirmation of insurance authorization.     SW confirmed with RN that pt is medically ready for discharge today.     SW discussed with    to arrange a time for discharge. RN is aware of discharge time and location and will inform patient/ family.     RN to attach IP Transfer Report to After Visit Summary packet for transfer to [facility].     ANNIE confirmed PASR screen was completed.     ANNIE ordered transportation of a [facility] through [facility].     [facility] will transport the pt at 1:00 PM to [facility] PCS form/ flow sheet completed.     RN to attach and print out with After Visit Summary Packet.     [facility] [phone]    SW/PRESLEY to remain available for support and/or discharge planning.     PLAN: DC to [facility] via [facility] at 1:00 PM    RN to call report to [phone]    Ruth ALTMAN, MSW ext. [phone]

## 2025-02-14 NOTE — DISCHARGE SUMMARY
[facility]  part of [facility]    Discharge Summary    Toño Cagle Jr. Patient Status:  Outpatient in a Bed    1945 MRN [id number]   Location [facility] 4W/SW/SE Attending Yang Denton MD   Hosp Day # 0 PCP Elías Underwood MD     Date of Admission: 2025 Disposition:   SNF     Date of Discharge:     2025     Admitting Diagnosis:   Torn right tendon, quadricep    Hospital Discharge Diagnoses:    Right quadriceps tendon rupture  S/p surgical repair of right quad tendon 2025 by Dr. Castaneda  Hx of   Hx of glaucoma.        Problem List:     Patient Active Problem List   Diagnosis    Glaucoma    Ulcerative colitis (HCC)    Spigelian hernia    Primary osteoarthritis of left knee    Aortic atherosclerosis    Right foot pain    Vitamin D deficiency    Age-related osteoporosis without current pathological fracture    Traumatic rupture of right quadriceps tendon       Physical Exam:      /77 (BP Location: Right arm)   Pulse 85   Temp 98.4 °F (36.9 °C) (Oral)   Resp 18   Ht 68\"   Wt 129 lb (58.5 kg)   SpO2 97%   BMI 19.61 kg/m²     Gen:   NAD.  A and O x 3  CV:   RRR, no m/g/r  Pulm:   CTA bilat  Abd:   +bs, soft, NT, ND  LE:   No c/c/e.   Right leg in splint and bandage which is c/d/i  Neuro:   nonfocal      Reason for Admission:   Post right quadriceps tendon repair.     HISTORY OF PRESENT ILLNESS:  Patient is a 79-year-old  male who slipped fell on ice on January 10, sustaining an injury to his right quadriceps with avulsion rupture.  He was placed in an extension immobilizer and evaluated by Orthopedic Surgery.  Scheduled today for above-mentioned procedure by Dr. Castaneda.  Postoperatively, transferred to PACU for further monitoring.      Hospital Course:     Right quadriceps tendon rupture  POD #3 s/p surgical repair of right quad tendon   - pain control, rx for norco given  - mobilize, PT/OT  - aspirin BID for dvt proph  - dc  planning for rehab  Ortho f/u      Hx of UC  - cont home meds     Hx of glaucoma.  Continue home medications.      dvt proph:    aspirin BID     Code status:    Full      Consultations:   Orthopedic surgery    Discharge Condition:  Good    Discharge Medications:      Discharge Medications        START taking these medications        Instructions Prescription details   aspirin 81 MG Tbec      Take 1 tablet (81 mg total) by mouth in the morning and 1 tablet (81 mg total) before bedtime.   Refills: 0     HYDROcodone-acetaminophen 5-325 MG Tabs  Commonly known as: Norco      Take 1-2 tablets by mouth every 6 (six) hours as needed for Pain.   Quantity: 30 tablet  Refills: 0            CONTINUE taking these medications        Instructions Prescription details   CALCIUM CITRATE OR      Take 600 mg by mouth.   Refills: 0     cholecalciferol 50 MCG (2000 UT) Tabs  Commonly known as: Vitamin D3      Take 1 tablet (2,000 Units total) by mouth daily.   Refills: 0     denosumab 60 MG/ML Sosy  Commonly known as: Prolia      Inject 1 mL (60 mg total) into the skin every 6 (six) months.   Refills: 0     latanoprost 0.005 % Soln  Commonly known as: Xalatan      Place into both eyes nightly.   Refills: 0     mesalamine 1.2 g Tbec  Commonly known as: LIALDA      Take 4 tablets (4.8 g total) by mouth daily.   Refills: 0               Where to Get Your Medications        Please  your prescriptions at the location directed by your doctor or nurse    Bring a paper prescription for each of these medications  HYDROcodone-acetaminophen 5-325 MG Tabs         Follow up Visits:     Follow-up Information       Gee Castaneda MD. Schedule an appointment as soon as possible for a visit in 1 week(s).    Specialties: Surgery, Orthopaedic, SURGERY, ORTHOPEDIC  Contact information:  [address] [place] [address]  [phone]                             Hospital Discharge Diagnoses:  Right quad tendon rupture    Lace+  Score: 41  59-90 High Risk  29-58 Medium Risk  0-28   Low Risk.    TCM Follow-Up Recommendation:  LACE 29-58: Moderate Risk of readmission after discharge from the hospital.    >35 minutes spent preparing this discharge.    Yang Moore MD  2/14/2025  11:32 AM

## 2025-02-24 NOTE — OPERATIVE REPORT
OP REPORT    PATIENT INFORMATION:    PATIENT NAME: Toño Cagle    YOB: 1945    DATE OF SURGERY: 2/11/2025    SURGERY FACILITY/HOSPITAL: [facility]    PREOPERATIVE DIAGNOSIS:  Right quadriceps tendon tear.    POSTOPERATIVE DIAGNOSIS:  Right quadriceps tendon tear.    OPERATIVE PROCEDURES:  Right open quadriceps tendon repair with Bioinductive implant.    OPERATIVE INFORMATION:     SURGEON:  Gee Castaneda MD.    ASSISTANT(S):    JOSE CARLOS Elliott.    ANESTHESIA:  General.    TOURNIQUET TIME:  20 minutes.    FLUIDS:  See anesthesia record.    ESTIMATED BLOOD LOSS:  Minimal    COMPLICATIONS:  None.    IMPLANTS:  Bioinductive implant.  INDICATIONS FOR PROCEDURE:     Toño presented with right knee pain and deformity after a fall.  He was placed in a brace at the emergency department and subsequently had an MRI done, which showed a quadriceps tendon tear.  The patient reported normal knee function without pain prior to their injury.  Given the patient's baseline functional status and desire to return to their preinjury activity level, the patient has elected to proceed with surgery.  We discussed quadriceps tendon repair. The risks, benefits and alternative treatments were discussed with the patient. Risks include but are not limited to persistence of pain, swelling, stiffness, infection, anterior knee pain, numbness, failure, retear, need for reoperation, neurovascular injury, intraoperative fracture and anesthesia problems, limb loss, and death. The patient understood the risks and benefits of the procedure as well as the alternatives and at this point elected to proceed and appropriate consents were obtained.      Operative Procedure:     The patient was met in the preoperative holding area. Consents were reviewed and all questions were answered. The patient confirmed to have no straight leg raise with pain on examination. The patient was then brought to the  operating room, positioned supine on a regular operating table. LMA anesthesia was induced. An exam under anesthesia showed FROM and no pathologic instability to the tibiofemoral or patellofemoral joints. The patient was then prepped and draped in standard sterile fashion and a timeout was completed to confirm the operative site, sterility of the field, and administration of perioperative antibiotics. The leg was then exsanguinated and the non-sterile tourniquet was elevated to 250mmHg for 20 minutes.     A longitudinal incision was made over the anterior right knee. Sharp dissection was carried down to the patellar retinaculum.?We felt a deep palpable defect at the superior pole of the patella with evidence of a complete quadriceps tendon rupture.  The medial and lateral patellar retinaculum were disruptued.  # 2 non absorbable suture were placed in interrupted fashion and tagged for later repair.  We elected to proceed with a Krackow suture transosseous repair with 1.7mm Suture Tape.?We washed the joint and wound with 1 L normal saline. We then used a rongeur to prepare a trough in the patella for the central portion of the tendon.?2 x 1.7mm suture tape were run in Krackow locking fashion through the quadriceps tendon with excellent purchase. These were passed through 3 transosseous 2.0mm tunnels with the aid of a Beadth pain.  These limbs were then tied with the leg in full extension after being retrieved through the far medial and lateral tunnels, respectively, beneath the patellar tendon.  Following this the retinaculum was closed.  A #2 Ethibond was then run in locking fashion from the superior cuff of tissue from the patella to the quadriceps insertion.  The wound was then thoroughly irrigated with 2 L of normal saline. Tourniquet was relased and hemostasis was obtained.  A bioinductive implant was then placed over the repair to reduce the risk of failure. The incision was closed with 0-Vicryl, 2-0 Vicryl in  a subcutaneous manner and 3-0 monocryl for skin closure. The wound was dressed with a steri strips, web roll, ace wrap and a splint in full extension.?All counts, verified in 2 separate count episodes, were correct at the end of the case.  I met with the family post operatively to discuss the operative findings and procedure.          The Surgical assistant was involved in all aspects of the surgery including positioning, prepping, draping, leg holding, assistance with suture management, dynamic examination, and wound closure. Attending surgeon was present for all critical portions of the procedure.  Nancy Sahni served this role.            Gee Castaneda MD    This document was digitally reviewed and approved by: Gee Castaneda MD    A: 2/23/2025 1:58:51 PM

## 2025-03-18 ENCOUNTER — TELEPHONE (OUTPATIENT)
Dept: INTERNAL MEDICINE CLINIC | Facility: CLINIC | Age: 80
End: 2025-03-18

## 2025-03-18 NOTE — TELEPHONE ENCOUNTER
Patient is being discharged today. Is starting home health services tomorrow 03/19. Requesting verbal agreement to follow home itzel orders.       Ext 102     Voicemail available

## 2025-03-19 NOTE — TELEPHONE ENCOUNTER
Called Lily and she was inform of Dr. Underwood message below. He will sign the home health orders.

## 2025-03-31 ENCOUNTER — MED REC SCAN ONLY (OUTPATIENT)
Dept: INTERNAL MEDICINE CLINIC | Facility: CLINIC | Age: 80
End: 2025-03-31

## 2025-04-11 ENCOUNTER — MED REC SCAN ONLY (OUTPATIENT)
Dept: INTERNAL MEDICINE CLINIC | Facility: CLINIC | Age: 80
End: 2025-04-11

## 2025-04-21 ENCOUNTER — HOSPITAL ENCOUNTER (OUTPATIENT)
Dept: ULTRASOUND IMAGING | Facility: HOSPITAL | Age: 80
Discharge: HOME | End: 2025-04-21
Attending: INTERNAL MEDICINE
Payer: MEDICARE

## 2025-04-21 ENCOUNTER — HOSPITAL ENCOUNTER (EMERGENCY)
Facility: HOSPITAL | Age: 80
Discharge: HOME | End: 2025-04-21
Attending: EMERGENCY MEDICINE
Payer: MEDICARE

## 2025-04-21 ENCOUNTER — OFFICE VISIT (OUTPATIENT)
Dept: INTERNAL MEDICINE CLINIC | Facility: CLINIC | Age: 80
End: 2025-04-21

## 2025-04-21 VITALS
DIASTOLIC BLOOD PRESSURE: 66 MMHG | OXYGEN SATURATION: 100 % | HEART RATE: 80 BPM | RESPIRATION RATE: 17 BRPM | BODY MASS INDEX: 18.64 KG/M2 | SYSTOLIC BLOOD PRESSURE: 108 MMHG | WEIGHT: 123 LBS | TEMPERATURE: 98 F | HEIGHT: 68 IN

## 2025-04-21 VITALS
WEIGHT: 123 LBS | TEMPERATURE: 98 F | OXYGEN SATURATION: 100 % | HEART RATE: 75 BPM | DIASTOLIC BLOOD PRESSURE: 77 MMHG | RESPIRATION RATE: 19 BRPM | HEIGHT: 68 IN | SYSTOLIC BLOOD PRESSURE: 123 MMHG | BODY MASS INDEX: 18.64 KG/M2

## 2025-04-21 DIAGNOSIS — I82.451 ACUTE DEEP VEIN THROMBOSIS (DVT) OF RIGHT PERONEAL VEIN (HCC): Primary | ICD-10-CM

## 2025-04-21 DIAGNOSIS — Z96.651 HISTORY OF RIGHT KNEE JOINT REPLACEMENT: ICD-10-CM

## 2025-04-21 DIAGNOSIS — M79.89 RIGHT LEG SWELLING: Primary | ICD-10-CM

## 2025-04-21 DIAGNOSIS — R23.4: ICD-10-CM

## 2025-04-21 DIAGNOSIS — M79.89 RIGHT LEG SWELLING: ICD-10-CM

## 2025-04-21 PROCEDURE — 1126F AMNT PAIN NOTED NONE PRSNT: CPT | Performed by: INTERNAL MEDICINE

## 2025-04-21 PROCEDURE — 93971 EXTREMITY STUDY: CPT | Performed by: INTERNAL MEDICINE

## 2025-04-21 PROCEDURE — 99283 EMERGENCY DEPT VISIT LOW MDM: CPT

## 2025-04-21 PROCEDURE — 99214 OFFICE O/P EST MOD 30 MIN: CPT | Performed by: INTERNAL MEDICINE

## 2025-04-21 PROCEDURE — 1159F MED LIST DOCD IN RCRD: CPT | Performed by: INTERNAL MEDICINE

## 2025-04-21 PROCEDURE — 1160F RVW MEDS BY RX/DR IN RCRD: CPT | Performed by: INTERNAL MEDICINE

## 2025-04-21 NOTE — ED INITIAL ASSESSMENT (HPI)
Pt reports right knee replacement on 2/11 with right leg swelling since then. Pt had outpt US today per surgeon recommendation and results per epic state \"Occlusive thrombus with below the right knee involving both peroneal veins \" Pt denies blood thinner use.

## 2025-04-21 NOTE — ED PROVIDER NOTES
Patient Seen in: [facility] Emergency Department    History     Chief Complaint   Patient presents with    Deep Vein Thrombosis     Stated Complaint: abnormal ct result    HPI    Patient complains of  swollen r leg.  Started after knee surgery. no chest pain, no SOB.  no recent travel, + immobilization.  min calf pain.  no fever, no redness    Alleviating factors: rest  Exacerbating factors: activity    Past Medical History[1]    Past Surgical History[2]         Family History[3]    Short Social Hx on File[4]    Review of Systems    Positive for stated complaint: abnormal ct result  Other systems are as noted in HPI.  Constitutional and vital signs reviewed.      All other systems reviewed and negative except as noted above.    PSFH elements reviewed from today and agreed except as otherwise stated in HPI.    Physical Exam     ED Triage Vitals [04/21/25 1413]   /71   Pulse 89   Resp 19   Temp 97.6 °F (36.4 °C)   Temp src Oral   SpO2 97 %   O2 Device None (Room air)       Current:/71   Pulse 89   Temp 97.6 °F (36.4 °C) (Oral)   Resp 19   Ht 172.7 cm (5' 8\")   Wt 55.8 kg   SpO2 97%   BMI 18.70 kg/m²    PULSE OX nl  GENERAL: awakel aelrt  HEAD: normocephalic, atraumatic,   EYES: PERRLA, EOMI, conj sclera clear    LUNGS: no resp distress,   CARDIO: RR    EXTREMITIES: r leg edema below knee no erythema or warmth  NEURO: alert and oiented *3, 2-12 intact, no focal deficit noted  SKIN:no redness or warmth  PSYCH: calm, cooperative,    Differential includes: DVT vs. Lymphedema vs. Venous insufficiency vs. Cellulitis vs. Bakers cyst      ED Course   Labs Reviewed - No data to display    MDM           Radiology Interpretation:  US VENOUS DOPPLER LEG RIGHT - DIAG IMG (CPT=93971)  Result Date: 4/21/2025  CONCLUSION:  Occlusive thrombus with below the right knee involving both peroneal veins.  Deep venous system of the right lower extremity is otherwise patent.    Dictated by (CST): Hilario Peña MD on  4/21/2025 at 1:47 PM     Finalized by (CST): Hilario Peña MD on 4/21/2025 at 1:49 PM                Medical Decision Making  Will start eliquis, notified primary and orthopedic surgeon since second episode heme referral    Problems Addressed:  Acute deep vein thrombosis (DVT) of right peroneal vein (HCC): acute illness or injury    Amount and/or Complexity of Data Reviewed  Radiology: ordered. Decision-making details documented in ED Course.    Risk  Prescription drug management.        Disposition and Plan     Clinical Impression:  1. Acute deep vein thrombosis (DVT) of right peroneal vein (HCC)        Disposition:  Discharge    Follow-up:  Hussein Perkins MD  [address] [facility], [place] [address]  [phone]    Follow up        Medications Prescribed:  Current Discharge Medication List        START taking these medications    Details   apixaban 5 MG Oral Tab Take 2 tabs (10mg) by mouth twice daily for 7 days, then take 1 tab (5mg) by mouth twice daily thereafter.  Qty: 74 tablet, Refills: 0                            [1]   Past Medical History:   Back problem    Colitis    Deep vein thrombosis (HCC)    2014 L lower leg    Glaucoma    History of stomach ulcers    Visual impairment    Glasses/contacts   [2]   Past Surgical History:  Procedure Laterality Date    Colonoscopy N/A 12/5/2016    Procedure: COLONOSCOPY;  Surgeon: Benito Ahuja MD;  Location: [facility] ENDOSCOPY    Colonoscopy N/A 8/1/2019    Procedure: COLONOSCOPY;  Surgeon: Benito Ahuja MD;  Location: [facility] ENDOSCOPY    Colonoscopy  12/17/2019         Hernia surgery     [3]   Family History  Problem Relation Age of Onset    High Blood Pressure Mother     Other (Other) Mother         Abd aneurysm    Cancer Father         colon     Breast Cancer Sister         Left Breast removed   [4]   Social History  Socioeconomic History    Marital status: Single   Tobacco Use    Smoking status: Never     Passive exposure: Never    Smokeless tobacco: Never    Vaping Use    Vaping status: Never Used   Substance and Sexual Activity    Alcohol use: Not Currently    Drug use: Never    Sexual activity: Yes     Partners: Male   Other Topics Concern    Caffeine Concern Yes     Comment: soda - 2 cans per day    Exercise Yes     Comment: active lifestyle   Social History Narrative    The patient does not use an assistive device..      The patient does not live in a home with stairs.     Social Drivers of Health     Food Insecurity: No Food Insecurity (2/11/2025)    NCSS - Food Insecurity     Worried About Running Out of Food in the Last Year: No     Ran Out of Food in the Last Year: No   Transportation Needs: No Transportation Needs (2/11/2025)    NCSS - Transportation     Lack of Transportation: No   Housing Stability: Not At Risk (2/11/2025)    NCSS - Housing/Utilities     Has Housing: Yes     Worried About Losing Housing: No     Unable to Get Utilities: No

## 2025-04-21 NOTE — PROGRESS NOTES
Subjective:   Patient ID: Toño Cagle Jr. is a 79 year old male.    HPI  Patient comes in today for follow-up after he had right knee surgery overall he is doing okay he has done inpatient therapy then did home therapy and now is ready for outpatient therapy the only complaint he has is that he continues to have swelling in that same leg from the foot all the way to the knee up his been using compression socks he is taking aspirin 81 mg twice a day patient did have a DVT on his left leg when he had a knee surgery on that side.  Also there is a healing wound with a scab on the knee which was caused by brace is healing.   History/Other:   Review of Systems   Constitutional: Negative.  Negative for fatigue and fever.   HENT: Negative.  Negative for congestion.    Eyes: Negative.    Respiratory: Negative.  Negative for cough, shortness of breath and wheezing.    Cardiovascular: Negative.  Negative for chest pain, palpitations and leg swelling.   Gastrointestinal: Negative.    Endocrine: Negative for cold intolerance and heat intolerance.   Genitourinary: Negative.  Negative for dysuria, flank pain and hematuria.   Musculoskeletal: Negative.  Negative for arthralgias, back pain and myalgias.        Rt leg swelling     Rt leg skin scab   Skin: Negative.    Neurological: Negative.  Negative for dizziness, tremors, syncope, weakness and headaches.   Psychiatric/Behavioral: Negative.  Negative for agitation, behavioral problems and suicidal ideas. The patient is not nervous/anxious.      Current Medications[1]  Allergies:Allergies[2]    Objective:   Physical Exam  Vitals and nursing note reviewed.   Constitutional:       Appearance: He is well-developed.   HENT:      Head: Normocephalic and atraumatic.      Right Ear: External ear normal.      Left Ear: External ear normal.      Nose: Nose normal.   Eyes:      Conjunctiva/sclera: Conjunctivae normal.      Pupils: Pupils are equal, round, and reactive to light.    Cardiovascular:      Rate and Rhythm: Normal rate and regular rhythm.      Heart sounds: Normal heart sounds.   Pulmonary:      Effort: Pulmonary effort is normal.      Breath sounds: Normal breath sounds.   Abdominal:      General: Bowel sounds are normal.      Palpations: Abdomen is soft.   Genitourinary:     Penis: Normal.       Prostate: Normal.      Rectum: Normal.   Musculoskeletal:         General: Swelling present. Normal range of motion.      Cervical back: Normal range of motion and neck supple.      Comments: Rt leg swelling     Rt leg skin scab   Skin:     General: Skin is warm and dry.   Neurological:      Mental Status: He is alert and oriented to person, place, and time.      Deep Tendon Reflexes: Reflexes are normal and symmetric.         Assessment & Plan:   1. Right leg swelling will send to hospital for ultrasound stat to rule out DVT   2. History of right knee joint replacement doing okay continue with therapy   3. Scab of knee healing just monitor       No orders of the defined types were placed in this encounter.      Meds This Visit:  Requested Prescriptions      No prescriptions requested or ordered in this encounter       Imaging & Referrals:  None         [1]   Current Outpatient Medications   Medication Sig Dispense Refill    aspirin 81 MG Oral Tab EC Take 1 tablet (81 mg total) by mouth in the morning and 1 tablet (81 mg total) before bedtime.      HYDROcodone-acetaminophen 5-325 MG Oral Tab Take 1-2 tablets by mouth every 6 (six) hours as needed for Pain. 30 tablet 0    denosumab 60 MG/ML Subcutaneous Solution Prefilled Syringe Inject 1 mL (60 mg total) into the skin every 6 (six) months.      mesalamine 1.2 g Oral Tab EC Take 4 tablets (4.8 g total) by mouth daily.      CALCIUM CITRATE OR Take 600 mg by mouth.      Cholecalciferol (VITAMIN D3) 2000 units Oral Tab Take 1 tablet (2,000 Units total) by mouth daily.      latanoprost 0.005 % Ophthalmic Solution Place into both eyes  nightly.     [2]   Allergies  Allergen Reactions    Latex HIVES    Sulfa Antibiotics HIVES    Imuran [Azathioprine] OTHER (SEE COMMENTS)     Reaction/DVT L Lower leg

## 2025-04-24 ENCOUNTER — OFFICE VISIT (OUTPATIENT)
Age: 80
End: 2025-04-24
Attending: INTERNAL MEDICINE
Payer: MEDICARE

## 2025-04-24 VITALS
DIASTOLIC BLOOD PRESSURE: 72 MMHG | HEART RATE: 86 BPM | RESPIRATION RATE: 16 BRPM | OXYGEN SATURATION: 97 % | BODY MASS INDEX: 18.79 KG/M2 | WEIGHT: 124 LBS | SYSTOLIC BLOOD PRESSURE: 115 MMHG | HEIGHT: 68 IN | TEMPERATURE: 97 F

## 2025-04-24 DIAGNOSIS — I82.451 ACUTE DEEP VEIN THROMBOSIS (DVT) OF RIGHT PERONEAL VEIN (HCC): Primary | ICD-10-CM

## 2025-04-24 DIAGNOSIS — S76.111D TRAUMATIC RUPTURE OF RIGHT QUADRICEPS TENDON, SUBSEQUENT ENCOUNTER: ICD-10-CM

## 2025-04-24 DIAGNOSIS — M79.89 LEG SWELLING: ICD-10-CM

## 2025-04-24 PROBLEM — I82.409 ACUTE DEEP VEIN THROMBOSIS (DVT) (HCC): Status: ACTIVE | Noted: 2025-04-24

## 2025-04-24 NOTE — CONSULTS
[facility] Hematology/Oncology Consultation Note    Patient Name: Toño Cagle Jr.   YOB: 1945   Medical Record Number: [id number]   CSN: [id number]   Consulting Physician: Mervin Fuentes MD  Referring Physician(s): Dr. Elías Underwood MD  Date of Consultation: 4/24/2025     Diagnosis  1. Acute deep vein thrombosis (DVT) of right peroneal vein (HCC)    2. Traumatic rupture of right quadriceps tendon, subsequent encounter      History of Present Illness:   Toño Cagle Jr. is a 79 year old male seen today in the Hematology Clinic  for DVT.  He had developed rupture of the right quadriceps tendon and required surgical repair on 2/11/2025.  Was taking aspirin and compression stockings but not on anticoagulation.  He was in a brace for 6 weeks but now working with therapy and able to ambulate 2-3 miles/day    4/21/2025 presented to his PCP where he was found to have right leg swelling.  Venous Doppler showed an occlusive thrombus below the right knee involving both peroneal veins.    He was seen in the emergency room and started on anticoagulation with apixaban and discharged home.  He started Eliquis 10mg BID and developed rectal bleeding, which he attributes to UC flare.  He has been on mesalamine.  He stopped the apixaban and did not take this anymore.    Still has some lower extremity swelling more on the right compared to the left. He has a history of DVT in 2014 that was treated with coumadin. Unclear if this was provoked.      Past Medical History:  Past Medical History:    Back problem    Colitis    Deep vein thrombosis (HCC)    2014 L lower leg    Glaucoma    History of stomach ulcers    Visual impairment    Glasses/contacts       Past Surgical History:  Past Surgical History:   Procedure Laterality Date    Colonoscopy N/A 12/5/2016    Procedure: COLONOSCOPY;  Surgeon: Benito Ahuja MD;  Location: [facility] ENDOSCOPY    Colonoscopy N/A 8/1/2019    Procedure: COLONOSCOPY;  Surgeon: Leigha  Benito Palma MD;  Location: [facility] ENDOSCOPY    Colonoscopy  12/17/2019         Hernia surgery         Family Medical History:  Family History   Problem Relation Age of Onset    High Blood Pressure Mother     Other (Other) Mother         Abd aneurysm    Cancer Father         colon     Breast Cancer Sister         Left Breast removed       Gyne History:      Social History:  Social History     Socioeconomic History    Marital status: Single     Spouse name: Not on file    Number of children: Not on file    Years of education: Not on file    Highest education level: Not on file   Occupational History    Not on file   Tobacco Use    Smoking status: Never     Passive exposure: Never    Smokeless tobacco: Never   Vaping Use    Vaping status: Never Used   Substance and Sexual Activity    Alcohol use: Not Currently    Drug use: Never    Sexual activity: Yes     Partners: Male   Other Topics Concern     Service Not Asked    Blood Transfusions Not Asked    Caffeine Concern Yes     Comment: soda - 2 cans per day    Occupational Exposure Not Asked    Hobby Hazards Not Asked    Sleep Concern Not Asked    Stress Concern Not Asked    Weight Concern Not Asked    Special Diet Not Asked    Back Care Not Asked    Exercise Yes     Comment: active lifestyle    Bike Helmet Not Asked    Seat Belt Not Asked    Self-Exams Not Asked   Social History Narrative    The patient does not use an assistive device..      The patient does not live in a home with stairs.     Social Drivers of Health     Food Insecurity: No Food Insecurity (2/11/2025)    NCSS - Food Insecurity     Worried About Running Out of Food in the Last Year: No     Ran Out of Food in the Last Year: No   Transportation Needs: No Transportation Needs (2/11/2025)    NCSS - Transportation     Lack of Transportation: No   Housing Stability: Not At Risk (2/11/2025)    NCSS - Housing/Utilities     Has Housing: Yes     Worried About Losing Housing: No     Unable to Get  Utilities: No       Allergies:   Allergies   Allergen Reactions    Latex HIVES    Sulfa Antibiotics HIVES    Imuran [Azathioprine] OTHER (SEE COMMENTS)     Reaction/DVT L Lower leg       Current Medications:  No outpatient medications have been marked as taking for the 4/24/25 encounter (Appointment) with Mervin Fuentes MD.       Review of Systems:  Pertinent positives and negatives noted in the the HPI.     Physical Examination:  /72 (BP Location: Left arm, Patient Position: Sitting, Cuff Size: adult)   Pulse 86   Temp 97.2 °F (36.2 °C) (Tympanic)   Resp 16   Ht 1.727 m (5' 8\")   Wt 56.2 kg (124 lb)   SpO2 97%   BMI 18.85 kg/m²    General: Patient is alert and oriented x 3, not in acute distress.  HEENT: EOMs intact. PERRL. Oropharynx is clear.   Neck: No JVD. No palpable lymphadenopathy. Neck is supple.  Lymphatics: There is no palpable lymphadenopathy throughout in the cervical, supraclavicular, or axillary regions.  Chest: Clear to auscultation. No wheezes or rales.  Heart: Regular rate and rhythm. S1S2 normal.  Extremities: LE swelling Lt >Rt  Neurological: Grossly intact.     Labs:    Lab Results   Component Value Date/Time    WBC 9.0 02/13/2025 05:05 AM    RBC 4.16 02/13/2025 05:05 AM    HGB 12.7 (L) 02/13/2025 05:05 AM    HCT 38.1 (L) 02/13/2025 05:05 AM    MCV 91.6 02/13/2025 05:05 AM    MCH 30.5 02/13/2025 05:05 AM    MCHC 33.3 02/13/2025 05:05 AM    RDW 13.5 02/13/2025 05:05 AM    NEPRELIM 5.16 02/13/2025 05:05 AM    .0 02/13/2025 05:05 AM     Impression:  Diagnosis  1. Acute deep vein thrombosis (DVT) of right peroneal vein (HCC)    2. Traumatic rupture of right quadriceps tendon, subsequent encounter      79-year-old with provoked right lower extremity calf DVT, he was started on apixiban but had rectal bleeding.    Plan:  1.  Given the provoked nature of his DVT I have recommended 3 months of anticoagulation or until he is fully ambulatory whichever is later. Since he had rectal  bleeding from his UC, I've recommended he try Eliquis 5mg BID w/o loading dose to see if this is manageable. He is also encouraged to follow-up with his gastroenterologist to address the ulcerative colitis.  2.  We discussed that anticoagulation increases the risk of bleeding, and the need to avoid NSAIDs and aspirin therapy  3.  He will contact us if he notices any other untoward symptoms, Otherwise will return for follow-up in 3 months    Thank you Dr Elías Underwood MD for the opportunity to participate in the care of this interesting patient. Please do contact me if I may be of any further assistance    Mervin Fuentes MD  [facility]     Copy to Dr. Elías Underwood MD

## 2025-04-28 ENCOUNTER — TELEPHONE (OUTPATIENT)
Age: 80
End: 2025-04-28

## 2025-04-28 NOTE — TELEPHONE ENCOUNTER
Toxicities: Eliquis (5 mg BID)    Rectal Bleeding    Toño reports having a long history of ulcerative colitis. He saw Dr Fuentes on 4/24/2025. He decreased his eliquis dose to 5 mg BID. He resumed the medication on Saturday. He had 1 episode of rectal bleeding after the morning dose and again after the evening dose. He then held the eliquis. He report no further rectal bleeding yesterday or today. He said he would like to speak with Dr Fuentes about the next step. \"He mentioned there is a shot I can get.'     I told Toño I would forward this message to Dr Fuentes and JOSY Mitchell now. Toño will await a call back.

## 2025-04-28 NOTE — TELEPHONE ENCOUNTER
Toño calling to let us know he was having rectal bleeding again over the weekend. He did stop taking his eliquis. Dr santacruz did mention to him about injections. Thank you alice

## 2025-05-02 ENCOUNTER — TELEPHONE (OUTPATIENT)
Age: 80
End: 2025-05-02

## 2025-05-02 NOTE — TELEPHONE ENCOUNTER
Toxicities: Eliquis    Rectal Bleeding    Toño said he was following JOSY Mitchell's directions to take one tablet of Eliquis after breakfast and one tablet after dinner. He still had rectal bleeding. He stopped taking it on Wendesday, yesterday and today. He has had formed, brown bowel movements that were easy to pass without any bleeding. He said he is reluctant to even take 1/2 a tab after breakfast and dinner because he feels the rectal bleeding will resume. He won't give himself the lovenox injections. He did ask if he goes back on coumadin would he potentially have a better chance of not having the rectal bleeding? He asked that Dr Fuentes or JOSY Mitchell call him back to discuss his options.

## 2025-05-02 NOTE — TELEPHONE ENCOUNTER
I called and spoke to Toño regarding his [employer] message about rectal bleeding when taking 5mg Eliquis twice per day.  Previously we spoke about this and he felt he may be having a flare from his UC, but he has noticed that he does not have bleeding when he does not take the anticoagulation and he does when he takes it.    Previously I had discussed Lovenox with the patient but he is unwilling to give himself injections.  I discussed with on call MD Dr. Perkins and it is recommended patient either continues the Eliquis with hemoglobin monitoring via outpatient labs or patient gets admitted to the hospital to start bridge of Heparin to Warfarin as patient tolerated Warfarin in 2014 without bleeding.      Toño expressed understanding and stated he will think about these two choices, but his decision as of now is to continue Eliquis 5mg twice daily as he does not have large amounts of bleeding with bowel movements and to follow up with his GI doctor to ensure UC is under control.     He is aware to call us at anytime to check his labs, discuss his medication regimen, or if he chooses to start the transition to warfarin.

## 2025-05-02 NOTE — TELEPHONE ENCOUNTER
Toño escobedo he says he is still having rectal bleeding after changing his medication. He took him self of elequis on Tuesday. He said he feels better and has had a normal bowel movement.  He said he is not willing to do home injections that wa suggested by dr santacruz. T/blaire jenkins

## 2025-05-06 ENCOUNTER — MED REC SCAN ONLY (OUTPATIENT)
Dept: INTERNAL MEDICINE CLINIC | Facility: CLINIC | Age: 80
End: 2025-05-06

## 2025-07-31 ENCOUNTER — OFFICE VISIT (OUTPATIENT)
Facility: LOCATION | Age: 80
End: 2025-07-31
Attending: INTERNAL MEDICINE
Payer: MEDICARE

## 2025-07-31 VITALS
BODY MASS INDEX: 19.25 KG/M2 | SYSTOLIC BLOOD PRESSURE: 124 MMHG | WEIGHT: 127 LBS | HEART RATE: 78 BPM | HEIGHT: 68 IN | RESPIRATION RATE: 16 BRPM | DIASTOLIC BLOOD PRESSURE: 70 MMHG | OXYGEN SATURATION: 97 %

## 2025-07-31 DIAGNOSIS — K52.9 COLITIS: ICD-10-CM

## 2025-07-31 DIAGNOSIS — I82.451 ACUTE DEEP VEIN THROMBOSIS (DVT) OF RIGHT PERONEAL VEIN (HCC): Primary | ICD-10-CM

## 2025-08-01 ENCOUNTER — TELEPHONE (OUTPATIENT)
Facility: LOCATION | Age: 80
End: 2025-08-01

## 2025-08-01 ENCOUNTER — HOSPITAL ENCOUNTER (OUTPATIENT)
Dept: ULTRASOUND IMAGING | Facility: HOSPITAL | Age: 80
Discharge: HOME | End: 2025-08-01
Attending: INTERNAL MEDICINE

## 2025-08-01 DIAGNOSIS — I82.451 ACUTE DEEP VEIN THROMBOSIS (DVT) OF RIGHT PERONEAL VEIN (HCC): ICD-10-CM

## 2025-08-01 DIAGNOSIS — K52.9 COLITIS: ICD-10-CM

## 2025-08-01 PROCEDURE — 93971 EXTREMITY STUDY: CPT | Performed by: INTERNAL MEDICINE

## 2025-08-04 ENCOUNTER — TELEPHONE (OUTPATIENT)
Facility: LOCATION | Age: 80
End: 2025-08-04

## 2025-08-05 ENCOUNTER — TELEPHONE (OUTPATIENT)
Facility: LOCATION | Age: 80
End: 2025-08-05

## (undated) DEVICE — GAMMEX® PI HYBRID SIZE 7, STERILE POWDER-FREE SURGICAL GLOVE, POLYISOPRENE AND NEOPRENE BLEND: Brand: GAMMEX

## (undated) DEVICE — TROCAR: Brand: KII FIOS FIRST ENTRY

## (undated) DEVICE — TETRA-FLEX CF WOVEN LATEX FREE ELASTIC BANDAGE 4" X 5.5 YD: Brand: TETRA-FLEX™CF

## (undated) DEVICE — SUTURE NUROLON 0 C545G

## (undated) DEVICE — [HIGH FLOW INSUFFLATOR,  DO NOT USE IF PACKAGE IS DAMAGED,  KEEP DRY,  KEEP AWAY FROM SUNLIGHT,  PROTECT FROM HEAT AND RADIOACTIVE SOURCES.]: Brand: PNEUMOSURE

## (undated) DEVICE — CANNULA SEAL

## (undated) DEVICE — DEVICE PORT SITE CLOSURE

## (undated) DEVICE — Device

## (undated) DEVICE — FENESTRATED BIPOLAR FORCEPS: Brand: ENDOWRIST

## (undated) DEVICE — BANDAGE COMPR 6INX12FT SMTH FOR LIMB EXSANG

## (undated) DEVICE — SMOKE EVACUATION TUBING 7/8 IN (22 MM) X 10 FT (3.1 M) TUBE WITH 8 IN (20 CM) INTEGRAL WAND & SPONGE GUARD: Brand: CONMED BUFFALO FILTER

## (undated) DEVICE — DEVICE FIX.SECURESTRAP 5MM

## (undated) DEVICE — PACK CDS LOWER EXTREMITY

## (undated) DEVICE — MEDI-VAC NON-CONDUCTIVE SUCTION TUBING 6MM X 1.8M (6FT.) L: Brand: CARDINAL HEALTH

## (undated) DEVICE — HEWSON SUTURE RETRIEVER: Brand: HEWSON SUTURE RETRIEVER

## (undated) DEVICE — SUTURE MONOCRYL 3-0 Y497G

## (undated) DEVICE — DISPOSABLE TOURNIQUET CUFF SINGLE BLADDER, DUAL PORT AND QUICK CONNECT CONNECTOR: Brand: COLOR CUFF

## (undated) DEVICE — COLUMN DRAPE

## (undated) DEVICE — TROCAR: Brand: KII SHIELDED BLADED ACCESS SYSTEM

## (undated) DEVICE — C-ARM: Brand: UNBRANDED

## (undated) DEVICE — INSUFFLATION NEEDLE TO ESTABLISH PNEUMOPERITONEUM.: Brand: INSUFFLATION NEEDLE

## (undated) DEVICE — SOL  .9 1000ML BTL

## (undated) DEVICE — 35 ML SYRINGE REGULAR TIP: Brand: MONOJECT

## (undated) DEVICE — KIT ORTH INSTR W/ HALL SAW BLDE DISP FOR ACL

## (undated) DEVICE — LINE MNTR ADLT SET O2 INTMD

## (undated) DEVICE — MEGA SUTURECUT ND: Brand: ENDOWRIST

## (undated) DEVICE — TOWEL,OR,DSP,ST,BLUE,DLX,2/PK,40PK/CS: Brand: MEDLINE

## (undated) DEVICE — TIP COVER ACCESSORY

## (undated) DEVICE — SOLUTION IRRIG 1000ML 0.9% NACL USP BTL

## (undated) DEVICE — BLADELESS OBTURATOR: Brand: WECK VISTA

## (undated) DEVICE — SYSTEM SURGYPAD VELCRO 36IN

## (undated) DEVICE — ROBOTIC: Brand: MEDLINE INDUSTRIES, INC.

## (undated) DEVICE — TROCAR: Brand: KII® SLEEVE

## (undated) DEVICE — SUTURE VICRYL 0 UR-6

## (undated) DEVICE — SUT COAT VCRL 2-0 27IN ABSRB UD 26MM CT-2

## (undated) DEVICE — Device: Brand: CUSTOM PROCEDURE KIT

## (undated) DEVICE — LAP CHOLE: Brand: MEDLINE INDUSTRIES, INC.

## (undated) DEVICE — SUTURE SILK 2-0 SH

## (undated) DEVICE — FORCEP RADIAL JAW 4

## (undated) DEVICE — INTENDED FOR TISSUE SEPARATION, AND OTHER PROCEDURES THAT REQUIRE A SHARP SURGICAL BLADE TO PUNCTURE OR CUT.: Brand: BARD-PARKER ® STAINLESS STEEL BLADES

## (undated) DEVICE — ENCORE® LATEX MICRO SIZE 8.5, STERILE LATEX POWDER-FREE SURGICAL GLOVE: Brand: ENCORE

## (undated) DEVICE — SUTURE VLOC 90 2-0 9" 2145

## (undated) DEVICE — BNDG,ELSTC,MATRIX,STRL,6"X5YD,LF,HOOK&LP: Brand: MEDLINE

## (undated) DEVICE — COTTON UNDERCAST PADDING,REGULAR FINISH: Brand: WEBRIL

## (undated) DEVICE — SUTURE CHROMIC 2-0 801H

## (undated) DEVICE — GAMMEX® PI HYBRID SIZE 8, STERILE POWDER-FREE SURGICAL GLOVE, POLYISOPRENE AND NEOPRENE BLEND: Brand: GAMMEX

## (undated) DEVICE — 3M™ STERI-STRIP™ REINFORCED ADHESIVE SKIN CLOSURES, R1547, 1/2 IN X 4 IN (12 MM X 100 MM), 6 STRIPS/ENVELOPE: Brand: 3M™ STERI-STRIP™

## (undated) DEVICE — DRAPE SHEET LAPCHOLE 124X100X7

## (undated) DEVICE — ARM DRAPE

## (undated) DEVICE — ADHESIVE MASTISOL 2/3CC VL

## (undated) DEVICE — LIGASURE LAP MARYLAND 37CM

## (undated) DEVICE — VIOLET BRAIDED (POLYGLACTIN 910), SYNTHETIC ABSORBABLE SUTURE: Brand: COATED VICRYL

## (undated) DEVICE — ZZ-CONVERTED-TO-522442- SPONGE 4X4 10PK

## (undated) DEVICE — MONOPOLAR CURVED SCISSORS: Brand: ENDOWRIST

## (undated) DEVICE — SUCTION CANISTER, 3000CC,SAFELINER: Brand: DEROYAL

## (undated) DEVICE — LAPAROVUE VISIBILITY SYSTEM LAPAROSCOPIC SOLUTIONS: Brand: LAPAROVUE

## (undated) DEVICE — SPONGE LAP 18X18IN WHT COT 4 PLY FLD STRUNG

## (undated) DEVICE — APPLICATOR PREP 26ML CHG 2% ISO ALC 70%

## (undated) DEVICE — SUTURE VICRYL 0

## (undated) DEVICE — 3M™ IOBAN™ 2 ANTIMICROBIAL INCISE DRAPE 6650EZ: Brand: IOBAN™ 2

## (undated) DEVICE — SUTURE MONOCRYL 3-0 Y936H

## (undated) DEVICE — SUT ETHBND XL 1 30IN NABSRB GRN 36MM CT-1 1/2 CIR TAPR

## (undated) DEVICE — MATERIAL SPLNT 4X15IN POLY CMFRT PRE CUT

## (undated) DEVICE — Device: Brand: DEFENDO AIR/WATER/SUCTION AND BIOPSY VALVE

## (undated) NOTE — LETTER
AUTHORIZATION FOR SURGICAL OPERATION OR OTHER PROCEDURE    1. I hereby authorize Dr. Katherin Grimes  and the [facility] staff assigned to my case to perform the following operation and/or procedure at the [facility]:    Left knee CSI and aspiration  2.   My physicia affirms that prior to the time of the procedure, I have explained to the patient and/or his/her guardian, the risks and benefits involved in the proposed treatment and any reasonable alternative to the proposed treatment.   I have also explained the risks a

## (undated) NOTE — LETTER
AUTHORIZATION FOR SURGICAL OPERATION OR OTHER PROCEDURE    1. I hereby authorize Dr. Tremaine Shah and the [facility] staff assigned to my case to perform the following operation and/or procedure at the [facility]:    LEFT knee Jerene Fredo and CAT under ultrasound guidance    2. My physician has explained the nature and purpose of the operation or other procedure, possible alternative methods of treatment, the risks involved, and the possibility of complication to me. I acknowledge that no guarantee has been made as to the result that may be obtained. 3.  I recognize that, during the course of this operation, or other procedure, unforseen conditions may necessitate additional or different procedure than those listed above. I, therefore, further authorize and request that the above named physician, his/her physician assistants or designees perform such procedures as are, in his/her professional opinion, necessary and desirable. 4.  Any tissue or organs removed in the operation or other procedure may be disposed of by and at the discretion of the [facility] staff and [facility]. 5.  I understand that in the event of a medical emergency, I will be transported by local paramedics to [facility] or other \[facility]\"" emergency department. 6.  I certify that I have read and fully understand the above consent to operation and/or other procedure. 7.  I acknowledge that my physician has explained sedation/analgesia administration to me including the risks and benefits. I consent to the administration of sedation/analgesia as may be necessary or desirable in the judgement of my physician. Witness signature: ___________________________________________________ Date:  ______/______/_____                    Time:  ________ A. M.  P.M.        Patient Name:  Mavis Whitten  [birth year]  [id number]         Patient signature: ___________________________________________________                 Statement of Physician  My signature below affirms that prior to the time of the procedure, I have explained to the patient and/or his/her guardian, the risks and benefits involved in the proposed treatment and any reasonable alternative to the proposed treatment. I have also explained the risks and benefits involved in the refusal of the proposed treatment and have answered the patient's questions.                         Date:  ______/______/_______  Provider                      Signature:  __________________________________________________________       Time:  ___________ ABRADY HERNANDEZ

## (undated) NOTE — LETTER
AUTHORIZATION FOR SURGICAL OPERATION OR OTHER PROCEDURE    1.  I hereby authorize Dr. Maine Greene and the [facility] staff assigned to my case to perform the following operation and/or procedure at the [facility]:    LEFT knee Durolane and CSI under ultrasound gu signature below affirms that prior to the time of the procedure, I have explained to the patient and/or his/her guardian, the risks and benefits involved in the proposed treatment and any reasonable alternative to the proposed treatment.   I have also expla

## (undated) NOTE — LETTER
AUTHORIZATION FOR SURGICAL OPERATION OR OTHER PROCEDURE    1.  I hereby authorize Dr. Mary Llamas and the [facility] staff assigned to my case to perform the following operation and/or procedure at the [facility]:     LEFT knee Durolane and CSI under ultrasound g ___________________________________________________               Statement of Physician  My signature below affirms that prior to the time of the procedure, I have explained to the patient and/or his/her guardian, the risks and benefits involved in the pr